# Patient Record
Sex: MALE | Race: WHITE | NOT HISPANIC OR LATINO | Employment: OTHER | URBAN - METROPOLITAN AREA
[De-identification: names, ages, dates, MRNs, and addresses within clinical notes are randomized per-mention and may not be internally consistent; named-entity substitution may affect disease eponyms.]

---

## 2017-01-03 RX ORDER — ESCITALOPRAM OXALATE 5 MG/1
5 TABLET ORAL DAILY
Qty: 90 TABLET | Refills: 3 | Status: SHIPPED | OUTPATIENT
Start: 2017-01-03 | End: 2017-12-13

## 2017-01-03 RX ORDER — ATORVASTATIN CALCIUM 20 MG/1
20 TABLET, FILM COATED ORAL DAILY
Qty: 90 TABLET | Refills: 1 | Status: SHIPPED | OUTPATIENT
Start: 2017-01-03 | End: 2017-06-24 | Stop reason: SDUPTHER

## 2017-01-03 RX ORDER — LISINOPRIL 5 MG/1
5 TABLET ORAL DAILY
Qty: 90 TABLET | Refills: 1 | Status: SHIPPED | OUTPATIENT
Start: 2017-01-03 | End: 2017-06-24 | Stop reason: SDUPTHER

## 2017-05-11 ENCOUNTER — TELEPHONE (OUTPATIENT)
Dept: INTERNAL MEDICINE | Facility: CLINIC | Age: 67
End: 2017-05-11

## 2017-05-11 NOTE — TELEPHONE ENCOUNTER
----- Message from Emily Cason sent at 5/11/2017 12:23 PM CDT -----  Contact: self [phone]  Patient is calling to received MD  medical licence number, stated he need to write it on a form he has . Please call and advise , Thanks !

## 2017-06-25 RX ORDER — ATORVASTATIN CALCIUM 20 MG/1
TABLET, FILM COATED ORAL
Qty: 90 TABLET | Refills: 3 | Status: SHIPPED | OUTPATIENT
Start: 2017-06-25 | End: 2017-06-27 | Stop reason: SDUPTHER

## 2017-06-25 RX ORDER — LISINOPRIL 5 MG/1
TABLET ORAL
Qty: 90 TABLET | Refills: 3 | Status: SHIPPED | OUTPATIENT
Start: 2017-06-25 | End: 2017-06-27 | Stop reason: SDUPTHER

## 2017-06-26 RX ORDER — TADALAFIL 20 MG/1
20 TABLET ORAL EVERY OTHER DAY
Qty: 6 TABLET | Refills: 11 | Status: SHIPPED | OUTPATIENT
Start: 2017-06-26 | End: 2019-12-19

## 2017-06-27 ENCOUNTER — PATIENT MESSAGE (OUTPATIENT)
Dept: INTERNAL MEDICINE | Facility: CLINIC | Age: 67
End: 2017-06-27

## 2017-06-27 RX ORDER — ATORVASTATIN CALCIUM 20 MG/1
20 TABLET, FILM COATED ORAL DAILY
Qty: 90 TABLET | Refills: 3 | Status: SHIPPED | OUTPATIENT
Start: 2017-06-27 | End: 2017-12-13

## 2017-06-27 RX ORDER — LISINOPRIL 5 MG/1
5 TABLET ORAL DAILY
Qty: 90 TABLET | Refills: 3 | Status: SHIPPED | OUTPATIENT
Start: 2017-06-27 | End: 2018-05-09 | Stop reason: DRUGHIGH

## 2017-12-13 ENCOUNTER — OFFICE VISIT (OUTPATIENT)
Dept: INTERNAL MEDICINE | Facility: CLINIC | Age: 67
End: 2017-12-13
Payer: MEDICARE

## 2017-12-13 ENCOUNTER — HOSPITAL ENCOUNTER (OUTPATIENT)
Dept: RADIOLOGY | Facility: HOSPITAL | Age: 67
Discharge: HOME | End: 2017-12-13
Attending: INTERNAL MEDICINE
Payer: MEDICARE

## 2017-12-13 ENCOUNTER — OFFICE VISIT (OUTPATIENT)
Dept: UROLOGY | Facility: CLINIC | Age: 67
End: 2017-12-13
Payer: MEDICARE

## 2017-12-13 ENCOUNTER — HOSPITAL ENCOUNTER (OUTPATIENT)
Dept: CARDIOLOGY | Facility: CLINIC | Age: 67
Discharge: HOME | End: 2017-12-13
Payer: MEDICARE

## 2017-12-13 ENCOUNTER — IMMUNIZATION (OUTPATIENT)
Dept: INTERNAL MEDICINE | Facility: CLINIC | Age: 67
End: 2017-12-13
Payer: MEDICARE

## 2017-12-13 VITALS
SYSTOLIC BLOOD PRESSURE: 124 MMHG | HEIGHT: 75 IN | WEIGHT: 194 LBS | OXYGEN SATURATION: 97 % | BODY MASS INDEX: 24.12 KG/M2 | DIASTOLIC BLOOD PRESSURE: 82 MMHG | HEART RATE: 64 BPM

## 2017-12-13 VITALS — RESPIRATION RATE: 16 BRPM | BODY MASS INDEX: 24.12 KG/M2 | HEIGHT: 75 IN | WEIGHT: 194 LBS

## 2017-12-13 DIAGNOSIS — E78.00 PURE HYPERCHOLESTEROLEMIA: ICD-10-CM

## 2017-12-13 DIAGNOSIS — M53.9 DISORDER CHARACTERIZED BY BACK PAIN: ICD-10-CM

## 2017-12-13 DIAGNOSIS — R41.3 MEMORY CHANGES: ICD-10-CM

## 2017-12-13 DIAGNOSIS — R97.20 ELEVATED PSA: Primary | ICD-10-CM

## 2017-12-13 DIAGNOSIS — R00.2 PALPITATION: ICD-10-CM

## 2017-12-13 DIAGNOSIS — Z13.6 SCREENING FOR ABDOMINAL AORTIC ANEURYSM: ICD-10-CM

## 2017-12-13 DIAGNOSIS — I10 ESSENTIAL HYPERTENSION: ICD-10-CM

## 2017-12-13 DIAGNOSIS — I10 ESSENTIAL HYPERTENSION: Primary | ICD-10-CM

## 2017-12-13 PROCEDURE — 99999 PR PBB SHADOW E&M-EST. PATIENT-LVL III: CPT | Mod: PBBFAC,,, | Performed by: INTERNAL MEDICINE

## 2017-12-13 PROCEDURE — 72100 X-RAY EXAM L-S SPINE 2/3 VWS: CPT | Mod: 26,,, | Performed by: RADIOLOGY

## 2017-12-13 PROCEDURE — G0008 ADMIN INFLUENZA VIRUS VAC: HCPCS | Mod: PBBFAC

## 2017-12-13 PROCEDURE — 99213 OFFICE O/P EST LOW 20 MIN: CPT | Mod: PBBFAC,25 | Performed by: UROLOGY

## 2017-12-13 PROCEDURE — 72120 X-RAY BEND ONLY L-S SPINE: CPT | Mod: 26,,, | Performed by: RADIOLOGY

## 2017-12-13 PROCEDURE — 99213 OFFICE O/P EST LOW 20 MIN: CPT | Mod: PBBFAC,25,27 | Performed by: INTERNAL MEDICINE

## 2017-12-13 PROCEDURE — 99999 PR PBB SHADOW E&M-EST. PATIENT-LVL III: CPT | Mod: PBBFAC,,, | Performed by: UROLOGY

## 2017-12-13 PROCEDURE — 93005 ELECTROCARDIOGRAM TRACING: CPT | Mod: PBBFAC | Performed by: INTERNAL MEDICINE

## 2017-12-13 PROCEDURE — 99213 OFFICE O/P EST LOW 20 MIN: CPT | Mod: S$PBB,,, | Performed by: UROLOGY

## 2017-12-13 PROCEDURE — 71020 XR CHEST PA AND LATERAL: CPT | Mod: TC

## 2017-12-13 PROCEDURE — 99214 OFFICE O/P EST MOD 30 MIN: CPT | Mod: S$PBB,,, | Performed by: INTERNAL MEDICINE

## 2017-12-13 PROCEDURE — 71020 XR CHEST PA AND LATERAL: CPT | Mod: 26,,, | Performed by: RADIOLOGY

## 2017-12-13 PROCEDURE — 72100 X-RAY EXAM L-S SPINE 2/3 VWS: CPT | Mod: TC

## 2017-12-13 PROCEDURE — 93010 ELECTROCARDIOGRAM REPORT: CPT | Mod: S$PBB,,, | Performed by: INTERNAL MEDICINE

## 2017-12-13 NOTE — PROGRESS NOTES
Subjective:      Patient ID: Ha Madrid is a 67 y.o. male.    Chief Complaint: Follow-up and addressing concerns from the [employer]   Ref: PI # [id number]  GOMEZ ID # [id number]    HPI:  HPI   Formal flight exam, paperwork and exam completed and where specialty exams were completed they were reviewed  Discussed eye exam: reviewed    Hypertension: well controlled    Hyperlipidemia: off statin    Colonoscopy 12/13 follow up in 5 years for colon polyps  Optho:9/27/2017 bilateral cataract, vitreous floater and presbyopia    Flu:  Tetanus: done  Shingles:discussed  Pneumovax: 2014  Prevnar: due and given 12/15/2015  Advance Directives:reminded the patient  Hearing:no deficits  Seat Belt : yes    Depression: patient has not taken lexapro since 3/2017 : had previously taken it intermittently  but not on a regular basis. Use was related to situational depression. Patient's situation has improved depression has resolved and no depression is being treated with or without medication.       Consultants:  Dermatology: Dr. Shukla  Urology: Dr. Graves  Today ( will follow up PSA yearly: biopsied last May)     Exercise: will start exercising  Family history of thyroid disease  Patient Active Problem List   Diagnosis    Anemia    Sleep difficulties: aggravated by leg discomfort ( anterior thigh) Ambien with an Aleve    Hypertension    Hyperlipidemia    Abnormal CT lung screening: follow up pulmonary nodule    Thyroid condition: calcifications, 1979 thyroid lobectomy    Colon polyps:12/13 follow up in 5 years    Special screening for malignant neoplasms, colon    Personal history of colonic polyps    Basal cell carcinoma, face     Past Medical History:   Diagnosis Date    Allergy     Basal cell carcinoma, face     left mandible    GERD (gastroesophageal reflux disease)     Hypertension      Past Surgical History:   Procedure Laterality Date    excision of basal cell ca on face      left  "hip      THYROIDECTOMY, PARTIAL      TONSILLECTOMY      VASECTOMY       Family History   Problem Relation Age of Onset    Cancer Mother     Melanoma Neg Hx      Review of Systems   Constitutional: Negative for activity change and unexpected weight change.   HENT: Negative for hearing loss, rhinorrhea and trouble swallowing.    Eyes: Negative for discharge and visual disturbance.   Respiratory: Negative for chest tightness and wheezing.    Cardiovascular: Negative for chest pain and palpitations.        Noticing some skip   Gastrointestinal: Negative for blood in stool, constipation, diarrhea and vomiting.   Endocrine: Negative for polydipsia and polyuria.   Genitourinary: Negative for difficulty urinating, hematuria and urgency.   Musculoskeletal: Positive for arthralgias and neck pain. Negative for joint swelling.   Neurological: Negative for weakness and headaches.   Psychiatric/Behavioral: Negative for confusion and dysphoric mood.     Objective:     Vitals:    12/13/17 1304   BP: 124/82   Pulse: 64   SpO2: 97%   Weight: 88 kg (194 lb)   Height: 6' 3" (1.905 m)   PainSc:   2   PainLoc: Back     Body mass index is 24.25 kg/m².  Physical Exam   Constitutional: He is oriented to person, place, and time. He appears well-developed and well-nourished. No distress.   Neck: Carotid bruit is not present. No thyromegaly present.   Cardiovascular: Normal rate, regular rhythm and normal heart sounds.  PMI is not displaced.    Pulmonary/Chest: Effort normal and breath sounds normal. No respiratory distress.   Abdominal: Soft. Bowel sounds are normal. He exhibits no distension. There is no tenderness.   Musculoskeletal: He exhibits no edema.   Neurological: He is alert and oriented to person, place, and time.     Assessment:     1. Essential hypertension    2. Pure hypercholesterolemia    3. Screening for abdominal aortic aneurysm    4. Palpitation    5. Disorder characterized by back pain    6. Memory changes      Plan: "   Ha was seen today for follow-up.    Diagnoses and all orders for this visit:    Amended December 13, 2017 note for clarification    Essential hypertension: well controlled  -     CBC auto differential; Future  -     Comprehensive metabolic panel; Future  -     X-Ray Chest PA And Lateral; Future    Pure hypercholesterolemia: on med and will obtain lab  -     Lipid panel; Future    Screening for abdominal aortic aneurysm  -     CAR US Abdominal Aorta; Future    Palpitation: lno significant issues  -     EKG 12-lead; Future    Disorder characterized by back pain  -     X-Ray Lumbar Spine Ap Lateral w/Flex Ext; Future    Memory changes: no significant issue  -     Vitamin B12; Future      Return in about 1 year (around 12/13/2018) for Yearly review.     Medication List          Accurate as of 12/13/17  1:55 PM. If you have any questions, ask your nurse or doctor.               CONTINUE taking these medications    aspirin 81 MG EC tablet  Commonly known as:  ECOTRIN     lisinopril 5 MG tablet  Commonly known as:  PRINIVIL,ZESTRIL  Take 1 tablet (5 mg total) by mouth once daily.     neomycin-polymyxin-dexamethasone 3.5 mg/g-10,000 unit/g-0.1 % Oint  Commonly known as:  DEXACINE  Use daily to eyelids as needed     omeprazole 20 MG tablet  Commonly known as:  PRILOSEC OTC     tadalafil 20 MG Tab  Commonly known as:  CIALIS  Take 1 tablet (20 mg total) by mouth every other day. Take every other day as needed        STOP taking these medications    atorvastatin 20 MG tablet  Commonly known as:  LIPITOR  Stopped by:  Brook Vasquez MD     escitalopram oxalate 5 MG Tab  Commonly known as:  LEXAPRO  Stopped by:  Brook Vasquez MD     multivitamin capsule  Stopped by:  Brook Vasquez MD

## 2017-12-13 NOTE — PROGRESS NOTES
Subjective:       Patient ID: Ha Madrid is a 67 y.o. male.    Chief Complaint: Elevated PSA    HPI patient is status post negative truss with biopsy and had a PSA of 6.6.  He should today for KENDRA and needs a repeat PSA.  Patient is voiding well    Past Medical History:   Diagnosis Date    Allergy     Basal cell carcinoma, face     left mandible    GERD (gastroesophageal reflux disease)     Hypertension        Past Surgical History:   Procedure Laterality Date    excision of basal cell ca on face      left hip      THYROIDECTOMY, PARTIAL      TONSILLECTOMY      VASECTOMY         Family History   Problem Relation Age of Onset    Cancer Mother     Melanoma Neg Hx        Social History     Social History    Marital status:      Spouse name: N/A    Number of children: N/A    Years of education: N/A     Occupational History    Physician      Social History Main Topics    Smoking status: Former Smoker     Packs/day: 0.50     Years: 15.00     Types: Cigarettes    Smokeless tobacco: Not on file    Alcohol use No    Drug use: No    Sexual activity: Yes     Birth control/ protection: Post-menopausal     Other Topics Concern    Not on file     Social History Narrative    No narrative on file       Allergies:  No known allergies    Medications:    Current Outpatient Prescriptions:     aspirin (ECOTRIN) 81 MG EC tablet, Take 81 mg by mouth once daily.  , Disp: , Rfl:     lisinopril (PRINIVIL,ZESTRIL) 5 MG tablet, Take 1 tablet (5 mg total) by mouth once daily., Disp: 90 tablet, Rfl: 3    neomycin-polymyxin-dexamethasone (DEXACINE) 3.5-10,000-0.1 mg-unit/g-% Oint, Use daily to eyelids as needed, Disp: 3.5 g, Rfl: 0    omeprazole (PRILOSEC OTC) 20 MG tablet, Take 20 mg by mouth once daily. Taking rarely, Disp: , Rfl:     tadalafil (CIALIS) 20 MG Tab, Take 1 tablet (20 mg total) by mouth every other day. Take every other day as needed, Disp: 6 tablet, Rfl: 11    Review of Systems    Constitutional: Negative for activity change, appetite change, chills, diaphoresis, fatigue, fever and unexpected weight change.   HENT: Negative for congestion, dental problem, hearing loss, mouth sores, postnasal drip, rhinorrhea, sinus pressure and trouble swallowing.    Eyes: Negative for pain, discharge and itching.   Respiratory: Negative for apnea, cough, choking, chest tightness, shortness of breath and wheezing.    Cardiovascular: Negative for chest pain, palpitations and leg swelling.   Gastrointestinal: Negative for abdominal distention, abdominal pain, anal bleeding, blood in stool, constipation, diarrhea, nausea, rectal pain and vomiting.   Endocrine: Negative for polydipsia and polyuria.   Genitourinary: Negative for decreased urine volume, difficulty urinating, discharge, dysuria, enuresis, flank pain, frequency, genital sores, hematuria, penile pain, penile swelling, scrotal swelling, testicular pain and urgency.   Musculoskeletal: Negative for arthralgias, back pain and myalgias.   Skin: Negative for color change, rash and wound.   Neurological: Negative for dizziness, syncope, speech difficulty, light-headedness and headaches.   Hematological: Negative for adenopathy. Does not bruise/bleed easily.   Psychiatric/Behavioral: Negative for behavioral problems, confusion, hallucinations and sleep disturbance.       Objective:      Physical Exam   Constitutional: He appears well-developed.   HENT:   Head: Normocephalic.   Cardiovascular: Normal rate.    Pulmonary/Chest: Effort normal.   Abdominal: Soft.   Genitourinary: Prostate normal.   Genitourinary Comments: 30 g benign   Neurological: He is alert.   Skin: Skin is warm.     Psychiatric: He has a normal mood and affect.       Assessment:       1. Elevated PSA        Plan:       Ha was seen today for elevated psa.    Diagnoses and all orders for this visit:    Elevated PSA  -     Prostate Specific Antigen, Diagnostic; Future  -     Prostate  Specific Antigen, Diagnostic; Future        on review PSA and return to clinic 1 year with PSA if his PSA is okay

## 2017-12-14 ENCOUNTER — CLINICAL SUPPORT (OUTPATIENT)
Dept: CARDIOLOGY | Facility: CLINIC | Age: 67
End: 2017-12-14
Attending: INTERNAL MEDICINE
Payer: MEDICARE

## 2017-12-14 DIAGNOSIS — Z13.6 SCREENING FOR ABDOMINAL AORTIC ANEURYSM: ICD-10-CM

## 2017-12-14 LAB — VASCULAR ABDOMINAL AORTIC ANEURYSM (AAA): 2.76

## 2017-12-14 PROCEDURE — 93978 VASCULAR STUDY: CPT | Mod: PBBFAC | Performed by: INTERNAL MEDICINE

## 2017-12-27 ENCOUNTER — TELEPHONE (OUTPATIENT)
Dept: INTERNAL MEDICINE | Facility: CLINIC | Age: 67
End: 2017-12-27

## 2017-12-27 DIAGNOSIS — E55.9 MILD VITAMIN D DEFICIENCY: ICD-10-CM

## 2017-12-27 DIAGNOSIS — R97.20 ELEVATED PSA: ICD-10-CM

## 2017-12-27 DIAGNOSIS — S32.010S COMPRESSION FRACTURE OF L1 LUMBAR VERTEBRA, SEQUELA: ICD-10-CM

## 2017-12-27 DIAGNOSIS — D64.9 ANEMIA, UNSPECIFIED TYPE: Primary | ICD-10-CM

## 2017-12-27 NOTE — TELEPHONE ENCOUNTER
Please schedule the following to [facility] at 9:00 am on Friday    Procedures Ordered This Visit    Vitamin D              Sedimentation rate, manual              Reticulocytes              Protein electrophoresis, serum              PROSTATE SPECIFIC ANTIGEN, DIAGNOSTIC              Iron and TIBC              HAPTOGLOBIN              FOLATE RBC              Ferritin              C-reactive protein               Please also add the diagnostic PSA of Dr. Star Madrid is aware that this is being scheduled and there is no need to call him.

## 2017-12-29 ENCOUNTER — LAB VISIT (OUTPATIENT)
Dept: LAB | Facility: HOSPITAL | Age: 67
End: 2017-12-29
Attending: UROLOGY
Payer: MEDICARE

## 2017-12-29 DIAGNOSIS — S32.010S COMPRESSION FRACTURE OF L1 LUMBAR VERTEBRA, SEQUELA: ICD-10-CM

## 2017-12-29 DIAGNOSIS — D64.9 ANEMIA, UNSPECIFIED TYPE: ICD-10-CM

## 2017-12-29 DIAGNOSIS — R97.20 ELEVATED PSA: ICD-10-CM

## 2017-12-29 DIAGNOSIS — E55.9 MILD VITAMIN D DEFICIENCY: ICD-10-CM

## 2017-12-29 LAB
25(OH)D3+25(OH)D2 SERPL-MCNC: 30 NG/ML
COMPLEXED PSA SERPL-MCNC: 5.7 NG/ML
COMPLEXED PSA SERPL-MCNC: 5.7 NG/ML
CRP SERPL-MCNC: 0.7 MG/L
ERYTHROCYTE [SEDIMENTATION RATE] IN BLOOD BY WESTERGREN METHOD: 7 MM/HR
FERRITIN SERPL-MCNC: 31 NG/ML
HAPTOGLOB SERPL-MCNC: 136 MG/DL
IRON SERPL-MCNC: 73 UG/DL
RETICS/RBC NFR AUTO: 1 %
SATURATED IRON: 20 %
TOTAL IRON BINDING CAPACITY: 360 UG/DL
TRANSFERRIN SERPL-MCNC: 243 MG/DL

## 2017-12-29 PROCEDURE — 84153 ASSAY OF PSA TOTAL: CPT

## 2017-12-29 PROCEDURE — 86140 C-REACTIVE PROTEIN: CPT

## 2017-12-29 PROCEDURE — 82747 ASSAY OF FOLIC ACID RBC: CPT

## 2017-12-29 PROCEDURE — 85045 AUTOMATED RETICULOCYTE COUNT: CPT

## 2017-12-29 PROCEDURE — 83540 ASSAY OF IRON: CPT

## 2017-12-29 PROCEDURE — 84165 PROTEIN E-PHORESIS SERUM: CPT

## 2017-12-29 PROCEDURE — 82306 VITAMIN D 25 HYDROXY: CPT

## 2017-12-29 PROCEDURE — 82728 ASSAY OF FERRITIN: CPT

## 2017-12-29 PROCEDURE — 83010 ASSAY OF HAPTOGLOBIN QUANT: CPT

## 2017-12-29 PROCEDURE — 84165 PROTEIN E-PHORESIS SERUM: CPT | Mod: 26,,, | Performed by: PATHOLOGY

## 2017-12-29 PROCEDURE — 85651 RBC SED RATE NONAUTOMATED: CPT

## 2018-01-01 ENCOUNTER — TELEPHONE (OUTPATIENT)
Dept: INTERNAL MEDICINE | Facility: CLINIC | Age: 68
End: 2018-01-01

## 2018-01-01 DIAGNOSIS — D64.9 ANEMIA, UNSPECIFIED TYPE: Primary | ICD-10-CM

## 2018-01-01 NOTE — TELEPHONE ENCOUNTER
I have sent th labs to Dr. Madrid. Please see if we can just recheck a cbc in 3 months:    CBC ordered

## 2018-01-02 LAB
ALBUMIN SERPL ELPH-MCNC: 3.89 G/DL
ALPHA1 GLOB SERPL ELPH-MCNC: 0.25 G/DL
ALPHA2 GLOB SERPL ELPH-MCNC: 0.69 G/DL
B-GLOBULIN SERPL ELPH-MCNC: 0.62 G/DL
FOLATE RBC-MCNC: 576 NG/ML
GAMMA GLOB SERPL ELPH-MCNC: 0.85 G/DL
PATHOLOGIST INTERPRETATION SPE: NORMAL
PROT SERPL-MCNC: 6.3 G/DL

## 2018-01-05 ENCOUNTER — PATIENT MESSAGE (OUTPATIENT)
Dept: INTERNAL MEDICINE | Facility: CLINIC | Age: 68
End: 2018-01-05

## 2018-04-05 ENCOUNTER — LAB VISIT (OUTPATIENT)
Dept: LAB | Facility: HOSPITAL | Age: 68
End: 2018-04-05
Attending: INTERNAL MEDICINE
Payer: MEDICARE

## 2018-04-05 DIAGNOSIS — D64.9 ANEMIA, UNSPECIFIED TYPE: ICD-10-CM

## 2018-04-05 LAB
BASOPHILS # BLD AUTO: 0.01 K/UL
BASOPHILS NFR BLD: 0.1 %
DIFFERENTIAL METHOD: ABNORMAL
EOSINOPHIL # BLD AUTO: 0.1 K/UL
EOSINOPHIL NFR BLD: 0.9 %
ERYTHROCYTE [DISTWIDTH] IN BLOOD BY AUTOMATED COUNT: 14 %
HCT VFR BLD AUTO: 38.7 %
HGB BLD-MCNC: 12.6 G/DL
IMM GRANULOCYTES # BLD AUTO: 0.02 K/UL
IMM GRANULOCYTES NFR BLD AUTO: 0.3 %
LYMPHOCYTES # BLD AUTO: 3.1 K/UL
LYMPHOCYTES NFR BLD: 39.7 %
MCH RBC QN AUTO: 29.9 PG
MCHC RBC AUTO-ENTMCNC: 32.6 G/DL
MCV RBC AUTO: 92 FL
MONOCYTES # BLD AUTO: 0.6 K/UL
MONOCYTES NFR BLD: 7.3 %
NEUTROPHILS # BLD AUTO: 4 K/UL
NEUTROPHILS NFR BLD: 51.7 %
NRBC BLD-RTO: 0 /100 WBC
PLATELET # BLD AUTO: 333 K/UL
PMV BLD AUTO: 9.9 FL
RBC # BLD AUTO: 4.22 M/UL
WBC # BLD AUTO: 7.79 K/UL

## 2018-04-05 PROCEDURE — 36415 COLL VENOUS BLD VENIPUNCTURE: CPT | Mod: PO

## 2018-04-05 PROCEDURE — 85025 COMPLETE CBC W/AUTO DIFF WBC: CPT

## 2018-05-09 ENCOUNTER — OFFICE VISIT (OUTPATIENT)
Dept: INTERNAL MEDICINE | Facility: CLINIC | Age: 68
End: 2018-05-09
Payer: MEDICARE

## 2018-05-09 VITALS
WEIGHT: 198.63 LBS | HEART RATE: 70 BPM | DIASTOLIC BLOOD PRESSURE: 68 MMHG | SYSTOLIC BLOOD PRESSURE: 122 MMHG | HEIGHT: 74 IN | BODY MASS INDEX: 25.49 KG/M2

## 2018-05-09 DIAGNOSIS — R51.9 HEADACHE ABOVE THE EYE REGION: ICD-10-CM

## 2018-05-09 DIAGNOSIS — J34.89 RHINORRHEA: ICD-10-CM

## 2018-05-09 DIAGNOSIS — J34.89 NASAL DRAINAGE: ICD-10-CM

## 2018-05-09 DIAGNOSIS — R51.9 HEADACHE DISORDER: Primary | ICD-10-CM

## 2018-05-09 PROCEDURE — 99999 PR PBB SHADOW E&M-EST. PATIENT-LVL IV: CPT | Mod: PBBFAC,,, | Performed by: INTERNAL MEDICINE

## 2018-05-09 PROCEDURE — 99214 OFFICE O/P EST MOD 30 MIN: CPT | Mod: PBBFAC | Performed by: INTERNAL MEDICINE

## 2018-05-09 PROCEDURE — 99214 OFFICE O/P EST MOD 30 MIN: CPT | Mod: S$PBB,,, | Performed by: INTERNAL MEDICINE

## 2018-05-09 RX ORDER — LISINOPRIL 2.5 MG/1
5 TABLET ORAL DAILY
Qty: 180 TABLET | Refills: 3 | Status: SHIPPED | OUTPATIENT
Start: 2018-05-09 | End: 2019-10-25 | Stop reason: SDUPTHER

## 2018-05-09 RX ORDER — ATORVASTATIN CALCIUM 20 MG/1
TABLET, FILM COATED ORAL
COMMUNITY
Start: 2018-03-09 | End: 2018-12-18

## 2018-05-09 NOTE — PROGRESS NOTES
Subjective:      Patient ID: Ha Madrid is a 67 y.o. male.    Chief Complaint: Headache (3 to 4 months )    HPI:  HPI   Patient has had headaches for the last several months. One area is in the right occipital area and the right frontal and right maxillary sinus with bending forward with watery discharge coming out of the nose. He also has had some swallowing problems. His father had a erosion of sinuses into the brain.    Exercising: bicycle 3 days a week interval training, 3 days a week weight training and takes Sunday off.    Patient has restarted lipitor and muscle pain and not come back.  Patient Active Problem List   Diagnosis    Anemia    Sleep difficulties: aggravated by leg discomfort ( anterior thigh) Ambien with an Aleve    Hypertension    Hyperlipidemia    Abnormal CT lung screening: follow up pulmonary nodule    Thyroid condition: calcifications, 1979 thyroid lobectomy    Colon polyps:12/13 follow up in 5 years    Special screening for malignant neoplasms, colon    Personal history of colonic polyps    Basal cell carcinoma, face     Past Medical History:   Diagnosis Date    Allergy     Basal cell carcinoma, face     left mandible    GERD (gastroesophageal reflux disease)     Hypertension      Past Surgical History:   Procedure Laterality Date    excision of basal cell ca on face      left hip      THYROIDECTOMY, PARTIAL      TONSILLECTOMY      VASECTOMY       Family History   Problem Relation Age of Onset    Cancer Mother     Melanoma Neg Hx      Review of Systems   Constitutional: Negative for activity change and unexpected weight change.   HENT: Positive for trouble swallowing. Negative for hearing loss and rhinorrhea.    Eyes: Negative for discharge and visual disturbance.   Respiratory: Negative for chest tightness and wheezing.    Cardiovascular: Negative for chest pain and palpitations.   Gastrointestinal: Negative for blood in stool, constipation, diarrhea and  "vomiting.   Endocrine: Negative for polydipsia and polyuria.   Genitourinary: Positive for difficulty urinating. Negative for hematuria and urgency.   Musculoskeletal: Positive for arthralgias and neck pain. Negative for joint swelling.   Neurological: Positive for headaches. Negative for weakness.   Psychiatric/Behavioral: Negative for confusion and dysphoric mood.     Objective:     Vitals:    05/09/18 0928   BP: 122/68   Pulse: 70   Weight: 90.1 kg (198 lb 10.2 oz)   Height: 6' 2" (1.88 m)   PainSc:   2   PainLoc: Head     Body mass index is 25.5 kg/m².  Physical Exam   Constitutional: He appears well-developed and well-nourished. No distress.   Neck: Carotid bruit is not present. No thyromegaly present.   Cardiovascular: Normal rate, regular rhythm and normal heart sounds.    Pulmonary/Chest: Effort normal and breath sounds normal. No respiratory distress.   Musculoskeletal: He exhibits no edema.     Assessment:     1. Headache disorder    2. Headache above the eye region    3. Nasal drainage    4. Rhinorrhea      Plan:   Ha was seen today for headache.    Diagnoses and all orders for this visit:    Headache disorder  -     Ambulatory consult to ENT    Headache above the eye region  -     Ambulatory consult to ENT    Nasal drainage  -     Ambulatory consult to ENT    Rhinorrhea: ENT    Other orders  -     lisinopril (PRINIVIL,ZESTRIL) 2.5 MG tablet; Take 2 tablets (5 mg total) by mouth once daily. Or per provider directions           Medication List          Accurate as of 5/9/18  6:10 PM. If you have any questions, ask your nurse or doctor.               CHANGE how you take these medications    lisinopril 2.5 MG tablet  Commonly known as:  PRINIVIL,ZESTRIL  Take 2 tablets (5 mg total) by mouth once daily. Or per provider directions  What changed:  · medication strength  · additional instructions  Changed by:  Brook Vasquez MD        CONTINUE taking these medications    aspirin 81 MG EC tablet  Commonly " known as:  ECOTRIN     atorvastatin 20 MG tablet  Commonly known as:  LIPITOR     neomycin-polymyxin-dexamethasone 3.5 mg/g-10,000 unit/g-0.1 % Oint  Commonly known as:  DEXACINE  Use daily to eyelids as needed     omeprazole 20 MG tablet  Commonly known as:  PRILOSEC OTC     tadalafil 20 MG Tab  Commonly known as:  CIALIS  Take 1 tablet (20 mg total) by mouth every other day. Take every other day as needed           Where to Get Your Medications      These medications were sent to [facility] - [place], [place] - [address] AT [address] [place] [address] [address], [facility] [address]    Phone:  [phone]   · lisinopril 2.5 MG tablet

## 2018-06-19 ENCOUNTER — OFFICE VISIT (OUTPATIENT)
Dept: OTOLARYNGOLOGY | Facility: CLINIC | Age: 68
End: 2018-06-19
Payer: MEDICARE

## 2018-06-19 VITALS
HEART RATE: 50 BPM | BODY MASS INDEX: 26.2 KG/M2 | WEIGHT: 204.13 LBS | SYSTOLIC BLOOD PRESSURE: 148 MMHG | HEIGHT: 74 IN | DIASTOLIC BLOOD PRESSURE: 90 MMHG

## 2018-06-19 DIAGNOSIS — J32.0 CHRONIC MAXILLARY SINUSITIS: Primary | ICD-10-CM

## 2018-06-19 DIAGNOSIS — K21.9 LARYNGOPHARYNGEAL REFLUX (LPR): ICD-10-CM

## 2018-06-19 DIAGNOSIS — J34.2 DEVIATED NASAL SEPTUM: ICD-10-CM

## 2018-06-19 DIAGNOSIS — J34.89 RHINORRHEA: ICD-10-CM

## 2018-06-19 PROCEDURE — 99999 PR PBB SHADOW E&M-EST. PATIENT-LVL III: CPT | Mod: PBBFAC,,, | Performed by: OTOLARYNGOLOGY

## 2018-06-19 PROCEDURE — 99204 OFFICE O/P NEW MOD 45 MIN: CPT | Mod: 25,S$PBB,, | Performed by: OTOLARYNGOLOGY

## 2018-06-19 PROCEDURE — 31575 DIAGNOSTIC LARYNGOSCOPY: CPT | Mod: PBBFAC | Performed by: OTOLARYNGOLOGY

## 2018-06-19 PROCEDURE — 99213 OFFICE O/P EST LOW 20 MIN: CPT | Mod: PBBFAC | Performed by: OTOLARYNGOLOGY

## 2018-06-19 RX ORDER — AMOXICILLIN 500 MG/1
500 TABLET, FILM COATED ORAL EVERY 12 HOURS
Qty: 28 TABLET | Refills: 0 | Status: SHIPPED | OUTPATIENT
Start: 2018-06-19 | End: 2018-07-03

## 2018-06-19 RX ORDER — FLUTICASONE PROPIONATE 50 MCG
2 SPRAY, SUSPENSION (ML) NASAL DAILY
Qty: 16 G | Refills: 2 | Status: SHIPPED | OUTPATIENT
Start: 2018-06-19 | End: 2018-09-12 | Stop reason: SDUPTHER

## 2018-06-19 NOTE — Clinical Note
June 19, 2018      Brook Vasquez MD  [address]  [facility] [address]           [facility] Otorhinol[facility]ology  [address]  [facility] [address]  Phone: [phone]  Fax: [phone]          Patient: Ha Madrid   MR Number: [id number]   YOB: 1950   Date of Visit: 6/19/2018       Dear Dr. Brook Vasquez:    Thank you for referring Ha Madrid to me for evaluation. Attached you will find relevant portions of my assessment and plan of care.    If you have questions, please do not hesitate to call me. I look forward to following Ha Madrid along with you.    Sincerely,    Jose Luis Urrutia MD    Enclosure  CC:  No Recipients    If you would like to receive this communication electronically, please contact [email or web address] or [phone] to request more information on [facility] [facility] Link access.    For providers and/or their staff who would like to refer a patient to [facility], please contact us through our one-stop-shop provider referral line, [facility], at [phone].    If you feel you have received this communication in error or would no longer like to receive these types of communications, please e-mail [email or web address]

## 2018-06-19 NOTE — PROGRESS NOTES
Subjective:      Ha Madrid is a 67 y.o. male who was referred to me by Dr. Brook Vasquez in consultation for nasal congestion.    He relates a 5-year history of perennial, moderate severity nasal blockage, alternating sides but worse on the right, and most pronounced at night.  This is associated with pressure in the cheeks and forehead and around the right eye.  His most concerning symptoms is copious, sporadic, watery rhinorrhea, which is both posterior and anterior, and provoked by position changes.  He relates that his father developed spontaneous CSF rhinorrhea at age 85 and  subsequent to craniotomy.  He denies headaches, hyposmia, aural fullness or notable sinus infections.  He does note some sneezing episodes, but has never had allergy testing or asthma.  He recalls using Nasonex many years ago with some relief, but gradually stopped using it.  He does not use antihistamines for concern it would affect his ability as a .  He takes omeprazole infrequently for reflux.  He does note recently some globus sensation and occasional difficulty swallowing.  He relates having nasal trauma playing sports in his youth, but no repair or nasal surgery.    SNOT-22 score = 21, NOSE score = 50%, ETDQ-7 score = 1.6    Global QOL = 85%    Days missed = Less than 5      Past Medical History  He has a past medical history of Allergy; Basal cell carcinoma, face; GERD (gastroesophageal reflux disease); and Hypertension.    Past Surgical History  He has a past surgical history that includes Thyroidectomy, partial; excision of basal cell ca on face; left hip; Vasectomy; and Tonsillectomy.    Family History  His family history includes Cancer in his mother.    Social History  He reports that he has quit smoking. His smoking use included Cigarettes. He has a 7.50 pack-year smoking history. He has never used smokeless tobacco. He reports that he does not drink alcohol or use drugs.    Allergies  He is allergic to  "no known allergies.    Medications   He has a current medication list which includes the following prescription(s): aspirin, atorvastatin, lisinopril, neomycin-polymyxin-dexamethasone, omeprazole, tadalafil, amoxicillin, and fluticasone.    Review of Systems  Review of Systems   Constitutional: Negative for fatigue, fever and unexpected weight change.   HENT: Positive for congestion, postnasal drip, rhinorrhea, sinus pressure and trouble swallowing. Negative for dental problem, ear discharge, ear pain, facial swelling, hearing loss, hoarse voice, nosebleeds, sore throat, tinnitus and voice change.    Eyes: Negative for photophobia, discharge, itching and visual disturbance.   Respiratory: Negative for apnea, cough, shortness of breath and wheezing.    Cardiovascular: Negative for chest pain and palpitations.   Gastrointestinal: Negative for abdominal pain, nausea and vomiting.   Endocrine: Negative for cold intolerance and heat intolerance.   Genitourinary: Positive for difficulty urinating.   Musculoskeletal: Negative for arthralgias, back pain, myalgias and neck pain.   Skin: Negative for rash.   Allergic/Immunologic: Positive for environmental allergies. Negative for food allergies.   Neurological: Negative for dizziness, seizures, syncope, weakness and headaches.   Hematological: Negative for adenopathy. Bruises/bleeds easily.   Psychiatric/Behavioral: Negative for decreased concentration, dysphoric mood and sleep disturbance. The patient is not nervous/anxious.           Objective:     BP (!) 148/90   Pulse (!) 50   Ht 6' 2" (1.88 m)   Wt 92.6 kg (204 lb 2.3 oz)   BMI 26.21 kg/m²        Constitutional:   He appears well-developed. He is cooperative. Normal speech.  No hoarse voice.      Head:  Normocephalic. Salivary glands normal.  Facial strength is normal.      Ears:    Right Ear: No drainage or tenderness. Tympanic membrane is not perforated. Tympanic membrane mobility is normal. No middle ear effusion. " No decreased hearing is noted.   Left Ear: No drainage or tenderness. Tympanic membrane is not perforated. Tympanic membrane mobility is normal.  No middle ear effusion. No decreased hearing is noted.     Nose:  Mucosal edema, rhinorrhea and septal deviation present. No polyps. No epistaxis. Turbinates normal, no turbinate masses and no turbinate hypertrophy.  Right sinus exhibits no maxillary sinus tenderness and no frontal sinus tenderness. Left sinus exhibits no maxillary sinus tenderness and no frontal sinus tenderness.     Mouth/Throat  Oropharynx clear and moist without lesions or asymmetry and normal uvula midline. He does not have dentures. Normal dentition. No oral lesions or mucous membrane lesions. No oropharyngeal exudate or posterior oropharyngeal erythema. Tonsils not present.  Mirror exam not performed due to patient tolerance.  Mirror exam not performed due to patient tolerance.      Neck:  Neck normal without thyromegaly masses, asymmetry, normal tracheal structure, crepitus, and tenderness, thyroid normal, trachea normal and no adenopathy. Normal range of motion present.     He has no cervical adenopathy.     Cardiovascular:   Regular rhythm.      Pulmonary/Chest:   Effort normal.     Psychiatric:   He has a normal mood and affect. His speech is normal and behavior is normal.     Neurological:   No cranial nerve deficit.     Skin:   No rash noted.       Procedure    Flexible laryngoscopy performed.  See procedure note.     Left nasal valve     Left MT with mild edema     Left posterior nasal cavity with clear mucus     Right nasal valve with septal spur     Right MT obscured by deviated septum     Larynx with mild reflux changes     Good mobility        Data Reviewed    WBC (K/uL)   Date Value   04/05/2018 7.79     Eosinophil% (%)   Date Value   04/05/2018 0.9     Eos # (K/uL)   Date Value   04/05/2018 0.1     Platelets (K/uL)   Date Value   04/05/2018 333     Glucose (mg/dL)   Date Value    12/13/2017 86     No results found for: IGE    No sinus imaging available.       Assessment:     1. Chronic maxillary sinusitis    2. Deviated nasal septum    3. Rhinorrhea    4. Laryngopharyngeal reflux (LPR)         Plan:     I had a long discussion with the patient regarding his condition and the further workup and management options.  I reassured him as to the benign nature of today's findings, though CSF rhinorrhea cannot be totally excluded.  We agreed upon empiric treatment for chronic rhinitis and sinusitis.  I prescribed a therapeutic course of amoxicillin for 14 days.  I prescribed the daily use of Flonase to treat sinonasal inflammation.  If not improved, we will obtain a CT sinuses.  I also suggested collection of CSF for beta-2 transferrin assay, though he believes this may not be practical.  I also suggested that he use omeprazole daily for control of reflux-related symptoms.    Follow-up in about 1 month (around 7/19/2018), or if symptoms worsen or fail to improve.

## 2018-06-19 NOTE — LETTER
2018    Brook Vasquez MD  [address] [place] [address]           OTOLARYNGOLOGY AND COMMUNICATION SCIENCES    Zain Aceves MD, FACS          SURGICAL AND MEDICAL DISEASES OF HEAD AND NECK  MD Zain Roberts MD, FACS  Jason Ayala III, MD    OTOLOGY, NEUROTOLOGY and SKULL-BASE SURGERY  Jag Hammond MD, FACS  Albert Espinoza MD, Director    PEDIATRIC OTOLARYNGOLOGY & OTOLOGY  LUIS Quinones MD, FAAP  Jimbo Ames MD, FACS    FACIAL PLASTIC and RECONSTRUCTIVE SURGERY  NICKOLAS Garcia III, MD, FACS    RHINOLOGY and SINUS SURGERY  Jose Luis Urrutia MD, MPH, FACS  Director   NICKOLAS Garcia III, MD, FACS    LARYNGOLOGY  Boris Lara MD    SPEECH-LANGUAGE PATHOLOGY  Margot Mosley, PhD, [facility]-SLP  Nazanin Adrian, MS, CCC-SLP  Haley Blackman, MS, CCC-SLP  Shwetha Barillas MA, [facility]-SLP    AUDIOLOGY SECTION  Elicia Barber, MS, CCC-A  PAN Abdullahi, CCC-A  Darvin Brock, CCC-A  Darvin Goss, CCC-A  Manuel Josue Jr., PAN, CCA-A  PAN Weber, CCC-A  Darvin Porter, CCC-A    [facility]  Head and Neck Surgical Oncology  RADHA Ty, FNP-C  Pedatric Otolaryngology  RADHA Ya, PNP-C       Re:  Ha Madrid  :  1950    Dear Dr. Vasquez,    Thank you for referring your patient, Ha Madrid, to us for evaluation and treatment.  I have enclosed a copy of my clinic note for your review and records.  If you have any questions please do not hesitate to contact our office.     Thank you for allowing me to participate in the care of your patient.    Sincerely,        Jose Luis Urrutia MD, MPH, FACS    Director, Rhinology and Sinus Surgery  Department of Otorhinolaryngology  [facility]    Encl:  Progress note       [facility] [address] [place] [address]  phone [phone]  fax [phone]   [email or web address]

## 2018-06-19 NOTE — PROCEDURES
Laryngoscopy  Date/Time: 6/19/2018 9:48 AM  Performed by: ROSENDA IDEGO  Authorized by: ROSENDA DIEGO     Consent Done?:  Yes (Verbal)  Anesthesia:     Local anesthetic:  Lidocaine 4% and Jesus-Synephrine 1/2%    Patient tolerance:  Patient tolerated the procedure well with no immediate complications  Laryngoscopy:     Areas examined:  Nasopharynx, oropharynx, hypopharynx, larynx, vocal cords and nasal cavities    Laryngoscope size:  4 mm  Nose Intranasal:      Mucosa no polyps     No mucosa lesions present     No septum gross deformity     Turbinates not enlarged  Nasopharynx:      No mucosa lesions     Adenoids not present     Posterior choanae patent     Eustachian tube patent  Larynx/hypopharynx:      No epiglottis lesions     No epiglottis edema     No AE folds lesions     No vocal cord polyps     Equal and normal bilateral     No hypopharynx lesions     No piriform sinus pooling     No piriform sinus lesions     Post cricoid edema     Post cricoid erythema     Mild left MT edema.  Marked rightward septal deviation.  No polyps or purulence.  Larynx with mild posterior inflammation.

## 2018-07-21 RX ORDER — ATORVASTATIN CALCIUM 20 MG/1
TABLET, FILM COATED ORAL
Qty: 90 TABLET | Refills: 3 | Status: SHIPPED | OUTPATIENT
Start: 2018-07-21 | End: 2018-12-18 | Stop reason: SDUPTHER

## 2018-07-30 ENCOUNTER — PATIENT MESSAGE (OUTPATIENT)
Dept: INTERNAL MEDICINE | Facility: CLINIC | Age: 68
End: 2018-07-30

## 2018-07-30 NOTE — TELEPHONE ENCOUNTER
The patient sent a message through the portal.   Please see message and advise.     Thank you    forms printed

## 2018-08-12 ENCOUNTER — TELEPHONE (OUTPATIENT)
Dept: INTERNAL MEDICINE | Facility: CLINIC | Age: 68
End: 2018-08-12

## 2018-08-15 ENCOUNTER — PATIENT MESSAGE (OUTPATIENT)
Dept: INTERNAL MEDICINE | Facility: CLINIC | Age: 68
End: 2018-08-15

## 2018-08-15 ENCOUNTER — TELEPHONE (OUTPATIENT)
Dept: INTERNAL MEDICINE | Facility: CLINIC | Age: 68
End: 2018-08-15

## 2018-09-12 DIAGNOSIS — J32.0 CHRONIC MAXILLARY SINUSITIS: ICD-10-CM

## 2018-09-12 RX ORDER — FLUTICASONE PROPIONATE 50 MCG
2 SPRAY, SUSPENSION (ML) NASAL DAILY
Qty: 16 G | Refills: 2 | Status: SHIPPED | OUTPATIENT
Start: 2018-09-12 | End: 2018-09-13 | Stop reason: SDUPTHER

## 2018-09-13 DIAGNOSIS — J32.0 CHRONIC MAXILLARY SINUSITIS: ICD-10-CM

## 2018-09-13 RX ORDER — FLUTICASONE PROPIONATE 50 MCG
2 SPRAY, SUSPENSION (ML) NASAL DAILY
Qty: 16 G | Refills: 2 | Status: SHIPPED | OUTPATIENT
Start: 2018-09-13 | End: 2018-11-08 | Stop reason: SDUPTHER

## 2018-11-06 ENCOUNTER — PATIENT MESSAGE (OUTPATIENT)
Dept: UROLOGY | Facility: CLINIC | Age: 68
End: 2018-11-06

## 2018-11-07 RX ORDER — TAMSULOSIN HYDROCHLORIDE 0.4 MG/1
0.4 CAPSULE ORAL DAILY
Qty: 90 CAPSULE | Refills: 3 | Status: SHIPPED | OUTPATIENT
Start: 2018-11-07 | End: 2019-12-19

## 2018-11-08 DIAGNOSIS — J32.0 CHRONIC MAXILLARY SINUSITIS: ICD-10-CM

## 2018-11-08 RX ORDER — FLUTICASONE PROPIONATE 50 MCG
2 SPRAY, SUSPENSION (ML) NASAL DAILY
Qty: 16 G | Refills: 2 | Status: SHIPPED | OUTPATIENT
Start: 2018-11-08 | End: 2019-04-09 | Stop reason: SDUPTHER

## 2018-11-19 DIAGNOSIS — Z86.010 HISTORY OF COLON POLYPS: Primary | ICD-10-CM

## 2018-11-21 DIAGNOSIS — Z12.11 COLON CANCER SCREENING: Primary | ICD-10-CM

## 2018-11-21 RX ORDER — POLYETHYLENE GLYCOL 3350, SODIUM SULFATE ANHYDROUS, SODIUM BICARBONATE, SODIUM CHLORIDE, POTASSIUM CHLORIDE 236; 22.74; 6.74; 5.86; 2.97 G/4L; G/4L; G/4L; G/4L; G/4L
4 POWDER, FOR SOLUTION ORAL ONCE
Qty: 4000 ML | Refills: 0 | Status: SHIPPED | OUTPATIENT
Start: 2018-11-21 | End: 2018-11-21

## 2018-12-18 ENCOUNTER — OFFICE VISIT (OUTPATIENT)
Dept: INTERNAL MEDICINE | Facility: CLINIC | Age: 68
End: 2018-12-18
Payer: MEDICARE

## 2018-12-18 ENCOUNTER — OFFICE VISIT (OUTPATIENT)
Dept: UROLOGY | Facility: CLINIC | Age: 68
End: 2018-12-18
Payer: MEDICARE

## 2018-12-18 ENCOUNTER — PATIENT MESSAGE (OUTPATIENT)
Dept: UROLOGY | Facility: CLINIC | Age: 68
End: 2018-12-18

## 2018-12-18 VITALS
HEART RATE: 60 BPM | SYSTOLIC BLOOD PRESSURE: 126 MMHG | HEIGHT: 75 IN | DIASTOLIC BLOOD PRESSURE: 80 MMHG | WEIGHT: 203 LBS | BODY MASS INDEX: 25.24 KG/M2

## 2018-12-18 VITALS
OXYGEN SATURATION: 98 % | HEIGHT: 75 IN | SYSTOLIC BLOOD PRESSURE: 124 MMHG | HEART RATE: 50 BPM | BODY MASS INDEX: 26.37 KG/M2 | DIASTOLIC BLOOD PRESSURE: 80 MMHG | WEIGHT: 212.06 LBS

## 2018-12-18 DIAGNOSIS — N40.1 BPH WITH URINARY OBSTRUCTION: Primary | ICD-10-CM

## 2018-12-18 DIAGNOSIS — I10 ESSENTIAL HYPERTENSION: Primary | ICD-10-CM

## 2018-12-18 DIAGNOSIS — D64.9 ANEMIA, UNSPECIFIED TYPE: Chronic | ICD-10-CM

## 2018-12-18 DIAGNOSIS — E78.00 PURE HYPERCHOLESTEROLEMIA: ICD-10-CM

## 2018-12-18 DIAGNOSIS — N13.8 BPH WITH URINARY OBSTRUCTION: Primary | ICD-10-CM

## 2018-12-18 DIAGNOSIS — Z12.5 PROSTATE CANCER SCREENING: ICD-10-CM

## 2018-12-18 PROCEDURE — 99213 OFFICE O/P EST LOW 20 MIN: CPT | Mod: PBBFAC | Performed by: UROLOGY

## 2018-12-18 PROCEDURE — 99999 PR PBB SHADOW E&M-EST. PATIENT-LVL III: CPT | Mod: PBBFAC,,, | Performed by: UROLOGY

## 2018-12-18 PROCEDURE — 99214 OFFICE O/P EST MOD 30 MIN: CPT | Mod: S$PBB,,, | Performed by: INTERNAL MEDICINE

## 2018-12-18 PROCEDURE — 99214 OFFICE O/P EST MOD 30 MIN: CPT | Mod: S$PBB,,, | Performed by: UROLOGY

## 2018-12-18 PROCEDURE — 99999 PR PBB SHADOW E&M-EST. PATIENT-LVL III: CPT | Mod: PBBFAC,,, | Performed by: INTERNAL MEDICINE

## 2018-12-18 PROCEDURE — 99213 OFFICE O/P EST LOW 20 MIN: CPT | Mod: PBBFAC,27 | Performed by: INTERNAL MEDICINE

## 2018-12-18 NOTE — PROGRESS NOTES
Subjective:       Patient ID: Ha Madrid is a 68 y.o. male.    Chief Complaint: No chief complaint on file.    HPI    Ha Madrid is a 68 y.o. male with PMHx of HTN here for his annual prostate evaluation. He had a negative TRUS with biopsy in 2016 when his PSA was 5.1. He has PSA labs pending today but his last PSA 11 months ago was 5.7. Notes nocturia up to 4-5x, weak stream, and has hesitancy. Stops drinking fluids at around 8 pm. He has been taking 0.4 mg Flomax qd for the past month and it has reduced nocturia to 2x.    Past Medical History:   Diagnosis Date    Allergy     Basal cell carcinoma, face     left mandible    GERD (gastroesophageal reflux disease)     Hypertension        Past Surgical History:   Procedure Laterality Date    COLONOSCOPY N/A 12/20/2013    Performed by L[facility] Pop [facility] at [facility] ([facility])    excision of basal cell ca on face      left hip      THYROIDECTOMY, PARTIAL      TONSILLECTOMY      VASECTOMY         Family History   Problem Relation Age of Onset    Cancer Mother     Melanoma Neg Hx        Social History     Socioeconomic History    Marital status:      Spouse name: Not on file    Number of children: Not on file    Years of education: Not on file    Highest education level: Not on file   Social Needs    Financial resource strain: Not on file    Food insecurity - worry: Not on file    Food insecurity - inability: Not on file    Transportation needs - medical: Not on file    Transportation needs - non-medical: Not on file   Occupational History    Occupation: Physician   Tobacco Use    Smoking status: Former Smoker     Packs/day: 0.50     Years: 15.00     Pack years: 7.50     Types: Cigarettes    Smokeless tobacco: Never Used   Substance and Sexual Activity    Alcohol use: No    Drug use: No    Sexual activity: Yes     Birth control/protection: Post-menopausal   Other Topics Concern    Not on file   Social History  Narrative    Not on file       Allergies:  No known allergies    Medications:    Current Outpatient Medications:     fluticasone (FLONASE) 50 mcg/actuation nasal spray, 2 sprays (100 mcg total) by Each Nare route once daily., Disp: 16 g, Rfl: 2    FLUZONE HIGH-DOSE 2018-19, PF, 180 mcg/0.5 mL vaccine, TO BE ADMINISTERED BY PHARMACIST FOR IMMUNIZATION, Disp: , Rfl: 0    lisinopril (PRINIVIL,ZESTRIL) 2.5 MG tablet, Take 2 tablets (5 mg total) by mouth once daily. Or per provider directions, Disp: 180 tablet, Rfl: 3    neomycin-polymyxin-dexamethasone (DEXACINE) 3.5-10,000-0.1 mg-unit/g-% Oint, Use daily to eyelids as needed, Disp: 3.5 g, Rfl: 0    tamsulosin (FLOMAX) 0.4 mg Cap, Take 1 capsule (0.4 mg total) by mouth once daily., Disp: 90 capsule, Rfl: 3    tadalafil (CIALIS) 20 MG Tab, Take 1 tablet (20 mg total) by mouth every other day. Take every other day as needed, Disp: 6 tablet, Rfl: 11    Review of Systems   Constitutional: Negative for activity change, appetite change, chills, diaphoresis, fatigue, fever and unexpected weight change.   HENT: Negative for congestion, dental problem, hearing loss, mouth sores, postnasal drip, rhinorrhea, sinus pressure and trouble swallowing.    Eyes: Negative for pain, discharge and itching.   Respiratory: Negative for apnea, cough, choking, chest tightness, shortness of breath and wheezing.    Cardiovascular: Negative for chest pain, palpitations and leg swelling.   Gastrointestinal: Negative for abdominal distention, abdominal pain, anal bleeding, blood in stool, constipation, diarrhea, nausea, rectal pain and vomiting.   Endocrine: Negative for polydipsia and polyuria.   Genitourinary: Negative for decreased urine volume, difficulty urinating, discharge, dysuria, enuresis, flank pain, frequency, genital sores, hematuria, penile pain, penile swelling and scrotal swelling.        Nocturia 2x, weak stream, and urinary hesitancy.   Musculoskeletal: Negative for  arthralgias, back pain and myalgias.   Skin: Negative for color change, rash and wound.   Neurological: Negative for dizziness, syncope, speech difficulty, light-headedness and headaches.   Hematological: Negative for adenopathy. Does not bruise/bleed easily.   Psychiatric/Behavioral: Negative for behavioral problems, confusion and sleep disturbance.       Objective:      Physical Exam   Constitutional: He appears well-developed.   HENT:   Head: Normocephalic.   Neck: Neck supple.   Cardiovascular: Normal rate.    Pulmonary/Chest: Effort normal.   Abdominal: Soft.   Genitourinary:   Genitourinary Comments: Prostate was smooth without nodularity. No rectal masses. 30-35 grams. Normal perineum.     Neurological: He is alert.   Skin: Skin is warm.     Psychiatric: He has a normal mood and affect.       Labs:     Ref Range & Units 11mo ago  (12/29/17) 11mo ago  (12/29/17)   PSA DIAGNOSTIC 0.00 - 4.00 ng/mL 5.7 Abnormally high   5.7 Abnormally high  CM     Assessment:       1. BPH with urinary obstruction        Plan:       Diagnoses and all orders for this visit:    BPH with urinary obstruction          Recommended increasing Flomax from qd to BID.  Discussed pros and cons of REZUM therapy. Patient will consider this and call if he wants it.  RTC 1 year with PSA.    [place], am acting as a scribe on this patient encounter in the presence and under the supervision of Dr. Mackey.    12/18/2018 10:50 AM    I, Dr. Mackey, personally performed the services described in this documentation.   All medical record entries made by the scribe were at my direction and in my presence.   I have reviewed the chart and agree that the record is accurate and complete.   Simba Mackey MD.  11:04 AM 12/18/2018

## 2018-12-24 ENCOUNTER — HOSPITAL ENCOUNTER (OUTPATIENT)
Facility: HOSPITAL | Age: 68
Discharge: HOME | End: 2018-12-24
Attending: COLON & RECTAL SURGERY | Admitting: COLON & RECTAL SURGERY
Payer: MEDICARE

## 2018-12-24 VITALS
RESPIRATION RATE: 16 BRPM | SYSTOLIC BLOOD PRESSURE: 156 MMHG | WEIGHT: 198 LBS | BODY MASS INDEX: 24.62 KG/M2 | HEART RATE: 50 BPM | OXYGEN SATURATION: 100 % | DIASTOLIC BLOOD PRESSURE: 83 MMHG | TEMPERATURE: 98 F | HEIGHT: 75 IN

## 2018-12-24 DIAGNOSIS — Z12.11 SCREENING FOR COLON CANCER: ICD-10-CM

## 2018-12-24 PROCEDURE — 88305 TISSUE EXAM BY PATHOLOGIST: CPT | Performed by: PATHOLOGY

## 2018-12-24 PROCEDURE — 27201012 HC FORCEPS, HOT/COLD, DISP: Performed by: COLON & RECTAL SURGERY

## 2018-12-24 PROCEDURE — C1773 RET DEV, INSERTABLE: HCPCS | Performed by: COLON & RECTAL SURGERY

## 2018-12-24 PROCEDURE — 45380 COLONOSCOPY AND BIOPSY: CPT | Mod: PT,,, | Performed by: COLON & RECTAL SURGERY

## 2018-12-24 PROCEDURE — 88305 TISSUE SPECIMEN TO PATHOLOGY - SURGERY: ICD-10-PCS | Mod: 26,,, | Performed by: PATHOLOGY

## 2018-12-24 PROCEDURE — 45380 PR COLONOSCOPY,BIOPSY: ICD-10-PCS | Mod: PT,,, | Performed by: COLON & RECTAL SURGERY

## 2018-12-24 PROCEDURE — 88305 TISSUE EXAM BY PATHOLOGIST: CPT | Mod: 26,,, | Performed by: PATHOLOGY

## 2018-12-24 PROCEDURE — 45380 COLONOSCOPY AND BIOPSY: CPT | Performed by: COLON & RECTAL SURGERY

## 2018-12-24 RX ORDER — ATORVASTATIN CALCIUM 20 MG/1
20 TABLET, FILM COATED ORAL DAILY
COMMUNITY
End: 2019-12-19 | Stop reason: SDUPTHER

## 2018-12-24 RX ORDER — SODIUM CHLORIDE 9 MG/ML
INJECTION, SOLUTION INTRAVENOUS CONTINUOUS
Status: DISCONTINUED | OUTPATIENT
Start: 2018-12-24 | End: 2018-12-24 | Stop reason: HOSPADM

## 2018-12-24 RX ORDER — SODIUM CHLORIDE 0.9 % (FLUSH) 0.9 %
3 SYRINGE (ML) INJECTION
Status: DISCONTINUED | OUTPATIENT
Start: 2018-12-24 | End: 2018-12-24 | Stop reason: HOSPADM

## 2018-12-24 RX ORDER — ASPIRIN 325 MG
50 TABLET, DELAYED RELEASE (ENTERIC COATED) ORAL DAILY
COMMUNITY
End: 2019-12-19

## 2018-12-24 RX ORDER — NAPROXEN SODIUM 220 MG
220 TABLET ORAL 2 TIMES DAILY WITH MEALS
COMMUNITY
End: 2019-12-19

## 2018-12-24 RX ORDER — ASPIRIN 81 MG/1
81 TABLET ORAL EVERY MORNING
COMMUNITY
End: 2021-09-17 | Stop reason: HOSPADM

## 2018-12-24 RX ORDER — MULTIVITAMIN
1 TABLET ORAL EVERY MORNING
COMMUNITY

## 2018-12-24 NOTE — [employer]OVATION PATIENT INSTRUCTIONS
Discharge Summary/Instructions after an Endoscopic Procedure  Patient Name: Ha Madrid  Patient MRN: [id number]  Patient YOB: 1950 Monday, December 24, 2018  Ryan Umanzor MD  RESTRICTIONS:  During your procedure today, you received medications for sedation.  These   medications may affect your judgment, balance and coordination.  Therefore,   for 24 hours, you have the following restrictions:   - DO NOT drive a car, operate machinery, make legal/financial decisions,   sign important papers or drink alcohol.    ACTIVITY:  Today: no heavy lifting, straining or running due to procedural   sedation/anesthesia.  The following day: return to full activity including work.  DIET:  Eat and drink normally unless instructed otherwise.     TREATMENT FOR COMMON SIDE EFFECTS:  - Mild abdominal pain, nausea, belching, bloating or excessive gas:  rest,   eat lightly and use a heating pad.  - Sore Throat: treat with throat lozenges and/or gargle with warm salt   water.  - Because air was used during the procedure, expelling large amounts of air   from your rectum or belching is normal.  - If a bowel prep was taken, you may not have a bowel movement for 1-3 days.    This is normal.  SYMPTOMS TO WATCH FOR AND REPORT TO YOUR PHYSICIAN:  1. Abdominal pain or bloating, other than gas cramps.  2. Chest pain.  3. Back pain.  4. Signs of infection such as: chills or fever occurring within 24 hours   after the procedure.  5. Rectal bleeding, which would show as bright red, maroon, or black stools.   (A tablespoon of blood from the rectum is not serious, especially if   hemorrhoids are present.)  6. Vomiting.  7. Weakness or dizziness.  GO DIRECTLY TO THE NEAREST EMERGENCY ROOM IF YOU HAVE ANY OF THE FOLLOWING:      Difficulty breathing              Chills and/or fever over 101 F   Persistent vomiting and/or vomiting blood   Severe abdominal pain   Severe chest pain   Black, tarry stools   Bleeding- more than one tablespoon   Any  other symptom or condition that you feel may need urgent attention  Your doctor recommends these additional instructions:  If any biopsies were taken, your doctors clinic will contact you in 1 to 2   weeks with any results.  - Discharge patient to home (ambulatory).   - Patient has a contact number available for emergencies.  The signs and   symptoms of potential delayed complications were discussed with the   patient.  Return to normal activities tomorrow.  Written discharge   instructions were provided to the patient.   - High fiber diet.   - Continue present medications.   - Repeat colonoscopy in 5 years for surveillance based on pathology   results.  For questions, problems or results please call your physician - Ryan Umanzor MD at Work:  [phone].  [facility], EMERGENCY ROOM PHONE NUMBER: [phone]  IF A COMPLICATION OR EMERGENCY SITUATION ARISES AND YOU ARE UNABLE TO REACH   YOUR PHYSICIAN - GO DIRECTLY TO THE EMERGENCY ROOM.  Ryan Umanzor MD  12/24/2018 8:42:56 AM  This report has been verified and signed electronically.  [employer]

## 2018-12-24 NOTE — H&P
Endoscopy H&P    Procedure : Colonoscopy      asymptomatic screening exam personal hx of advanced adenoma and family history of colon cancer (sister)      Past Medical History:   Diagnosis Date    Allergy     Basal cell carcinoma, face     left mandible    GERD (gastroesophageal reflux disease)     Hypertension      Sedation Problems: NO  Family History   Problem Relation Age of Onset    Cancer Mother     Colon cancer Sister     Melanoma Neg Hx      Fam Hx of Sedation Problems: NO  Social History     Socioeconomic History    Marital status:      Spouse name: Not on file    Number of children: Not on file    Years of education: Not on file    Highest education level: Not on file   Social Needs    Financial resource strain: Not on file    Food insecurity - worry: Not on file    Food insecurity - inability: Not on file    Transportation needs - medical: Not on file    Transportation needs - non-medical: Not on file   Occupational History    Occupation: Physician   Tobacco Use    Smoking status: Former Smoker     Packs/day: 0.50     Years: 15.00     Pack years: 7.50     Types: Cigarettes    Smokeless tobacco: Never Used   Substance and Sexual Activity    Alcohol use: No    Drug use: No    Sexual activity: Yes   Other Topics Concern    Not on file   Social History Narrative    Not on file         Review of Systems  Respiratory ROS: no cough, shortness of breath, or wheezing  Cardiovascular ROS: no chest pain or dyspnea on exertion  Gastrointestinal ROS: no abdominal pain, change in bowel habits, or black or bloody stools  Musculoskeletal ROS: negative  Neurological ROS: no TIA or stroke symptoms        Physical Exam:  General: well developed, well nourished, no distress  Head: normocephalic  Mallampati Score   Neck: supple, symmetrical, trachea midline  Lungs:  clear to auscultation bilaterally and normal respiratory  effort  Heart: regular rate and rhythm, S1, S2 normal, no murmur, rub or gallop  Abdomen: soft, non-tender non-distented; bowel sounds normal; no masses,  no organomegaly  Extremities: no cyanosis or edema, or clubbing    Sedation Plan : per anesthesia    II    Colonoscopy - details of the procedure, risks discussed with pt

## 2018-12-24 NOTE — PLAN OF CARE
"Patient requests no anesthesia for procedure. Dr. Umanzor aware. Dr. Umanzor states, "It's up to him if he wants an IV". Patient refuses IV.  "

## 2018-12-31 ENCOUNTER — TELEPHONE (OUTPATIENT)
Dept: ENDOSCOPY | Facility: HOSPITAL | Age: 68
End: 2018-12-31

## 2019-01-04 ENCOUNTER — PATIENT MESSAGE (OUTPATIENT)
Dept: SURGERY | Facility: CLINIC | Age: 69
End: 2019-01-04

## 2019-01-07 NOTE — PROGRESS NOTES
Pathology:  Benign non adenomatous tissue.      Prior hx of polyps and sister with colon cancer    Repeat colonoscopy in 5 years       If you have any questions or if I can clarify any of the above please contact me:    EPIC message  Mobile [phone]   Pager [phone]  email [email or web address]  Nurse Swapna Carias [phone]   Jessica Henderson:  [phone]    Sincerely  H, Ryan Umanzor MD, FACS, FASCRS  Staff Surgeon  Dept of Colon and Rectal Surgery

## 2019-01-18 ENCOUNTER — PES CALL (OUTPATIENT)
Dept: ADMINISTRATIVE | Facility: CLINIC | Age: 69
End: 2019-01-18

## 2019-04-09 DIAGNOSIS — J32.0 CHRONIC MAXILLARY SINUSITIS: ICD-10-CM

## 2019-04-10 RX ORDER — FLUTICASONE PROPIONATE 50 MCG
SPRAY, SUSPENSION (ML) NASAL
Qty: 16 G | Refills: 2 | Status: SHIPPED | OUTPATIENT
Start: 2019-04-10 | End: 2019-12-19 | Stop reason: SDUPTHER

## 2019-05-10 ENCOUNTER — PES CALL (OUTPATIENT)
Dept: ADMINISTRATIVE | Facility: CLINIC | Age: 69
End: 2019-05-10

## 2019-06-10 ENCOUNTER — PES CALL (OUTPATIENT)
Dept: ADMINISTRATIVE | Facility: CLINIC | Age: 69
End: 2019-06-10

## 2019-07-05 ENCOUNTER — PES CALL (OUTPATIENT)
Dept: ADMINISTRATIVE | Facility: CLINIC | Age: 69
End: 2019-07-05

## 2019-07-20 DIAGNOSIS — J32.0 CHRONIC MAXILLARY SINUSITIS: ICD-10-CM

## 2019-07-20 RX ORDER — FLUTICASONE PROPIONATE 50 MCG
SPRAY, SUSPENSION (ML) NASAL
Qty: 16 G | Refills: 2 | Status: SHIPPED | OUTPATIENT
Start: 2019-07-20 | End: 2019-12-19 | Stop reason: SDUPTHER

## 2019-08-08 RX ORDER — ATORVASTATIN CALCIUM 20 MG/1
TABLET, FILM COATED ORAL
Qty: 90 TABLET | Refills: 3 | Status: SHIPPED | OUTPATIENT
Start: 2019-08-08 | End: 2020-07-26

## 2019-09-16 DIAGNOSIS — N13.8 BPH WITH URINARY OBSTRUCTION: Primary | ICD-10-CM

## 2019-09-16 DIAGNOSIS — N40.1 BPH WITH URINARY OBSTRUCTION: Primary | ICD-10-CM

## 2019-09-22 ENCOUNTER — PATIENT MESSAGE (OUTPATIENT)
Dept: INTERNAL MEDICINE | Facility: CLINIC | Age: 69
End: 2019-09-22

## 2019-09-23 RX ORDER — OMEPRAZOLE 20 MG/1
20 CAPSULE, DELAYED RELEASE ORAL DAILY
Qty: 30 CAPSULE | Refills: 2 | Status: SHIPPED | OUTPATIENT
Start: 2019-09-23 | End: 2019-11-17 | Stop reason: SDUPTHER

## 2019-10-11 ENCOUNTER — TELEPHONE (OUTPATIENT)
Dept: INTERNAL MEDICINE | Facility: CLINIC | Age: 69
End: 2019-10-11

## 2019-10-11 DIAGNOSIS — I10 ESSENTIAL HYPERTENSION: Primary | ICD-10-CM

## 2019-10-25 RX ORDER — LISINOPRIL 2.5 MG/1
5 TABLET ORAL DAILY
Qty: 180 TABLET | Refills: 3 | Status: SHIPPED | OUTPATIENT
Start: 2019-10-25 | End: 2020-03-19 | Stop reason: DRUGHIGH

## 2019-11-02 DIAGNOSIS — J32.0 CHRONIC MAXILLARY SINUSITIS: ICD-10-CM

## 2019-11-04 RX ORDER — FLUTICASONE PROPIONATE 50 MCG
SPRAY, SUSPENSION (ML) NASAL
Qty: 16 G | Refills: 2 | Status: SHIPPED | OUTPATIENT
Start: 2019-11-04 | End: 2020-06-17

## 2019-11-17 RX ORDER — OMEPRAZOLE 20 MG/1
CAPSULE, DELAYED RELEASE ORAL
Qty: 30 CAPSULE | Refills: 2 | Status: SHIPPED | OUTPATIENT
Start: 2019-11-17 | End: 2020-03-19 | Stop reason: SDUPTHER

## 2019-12-16 ENCOUNTER — LAB VISIT (OUTPATIENT)
Dept: LAB | Facility: HOSPITAL | Age: 69
End: 2019-12-16
Attending: UROLOGY
Payer: MEDICARE

## 2019-12-16 DIAGNOSIS — N40.1 BPH WITH URINARY OBSTRUCTION: ICD-10-CM

## 2019-12-16 DIAGNOSIS — I10 ESSENTIAL HYPERTENSION: ICD-10-CM

## 2019-12-16 DIAGNOSIS — N13.8 BPH WITH URINARY OBSTRUCTION: ICD-10-CM

## 2019-12-16 LAB
ALBUMIN SERPL BCP-MCNC: 3.8 G/DL (ref 3.5–5.2)
ALP SERPL-CCNC: 83 U/L (ref 55–135)
ALT SERPL W/O P-5'-P-CCNC: 22 U/L (ref 10–44)
ANION GAP SERPL CALC-SCNC: 6 MMOL/L (ref 8–16)
AST SERPL-CCNC: 33 U/L (ref 10–40)
BASOPHILS # BLD AUTO: 0.02 K/UL (ref 0–0.2)
BASOPHILS NFR BLD: 0.3 % (ref 0–1.9)
BILIRUB SERPL-MCNC: 0.4 MG/DL (ref 0.1–1)
BUN SERPL-MCNC: 21 MG/DL (ref 8–23)
CALCIUM SERPL-MCNC: 9.4 MG/DL (ref 8.7–10.5)
CHLORIDE SERPL-SCNC: 105 MMOL/L (ref 95–110)
CHOLEST SERPL-MCNC: 144 MG/DL (ref 120–199)
CHOLEST/HDLC SERPL: 2.8 {RATIO} (ref 2–5)
CO2 SERPL-SCNC: 28 MMOL/L (ref 23–29)
COMPLEXED PSA SERPL-MCNC: 10.3 NG/ML (ref 0–4)
CREAT SERPL-MCNC: 1.2 MG/DL (ref 0.5–1.4)
DIFFERENTIAL METHOD: ABNORMAL
EOSINOPHIL # BLD AUTO: 0.2 K/UL (ref 0–0.5)
EOSINOPHIL NFR BLD: 2.8 % (ref 0–8)
ERYTHROCYTE [DISTWIDTH] IN BLOOD BY AUTOMATED COUNT: 13.5 % (ref 11.5–14.5)
EST. GFR  (AFRICAN AMERICAN): >60 ML/MIN/1.73 M^2
EST. GFR  (NON AFRICAN AMERICAN): >60 ML/MIN/1.73 M^2
GLUCOSE SERPL-MCNC: 89 MG/DL (ref 70–110)
HCT VFR BLD AUTO: 39.3 % (ref 40–54)
HDLC SERPL-MCNC: 51 MG/DL (ref 40–75)
HDLC SERPL: 35.4 % (ref 20–50)
HGB BLD-MCNC: 12.4 G/DL (ref 14–18)
IMM GRANULOCYTES # BLD AUTO: 0.01 K/UL (ref 0–0.04)
IMM GRANULOCYTES NFR BLD AUTO: 0.2 % (ref 0–0.5)
LDLC SERPL CALC-MCNC: 83 MG/DL (ref 63–159)
LYMPHOCYTES # BLD AUTO: 2.6 K/UL (ref 1–4.8)
LYMPHOCYTES NFR BLD: 42.2 % (ref 18–48)
MCH RBC QN AUTO: 29.9 PG (ref 27–31)
MCHC RBC AUTO-ENTMCNC: 31.6 G/DL (ref 32–36)
MCV RBC AUTO: 95 FL (ref 82–98)
MONOCYTES # BLD AUTO: 0.5 K/UL (ref 0.3–1)
MONOCYTES NFR BLD: 7.9 % (ref 4–15)
NEUTROPHILS # BLD AUTO: 2.9 K/UL (ref 1.8–7.7)
NEUTROPHILS NFR BLD: 46.6 % (ref 38–73)
NONHDLC SERPL-MCNC: 93 MG/DL
NRBC BLD-RTO: 0 /100 WBC
PLATELET # BLD AUTO: 398 K/UL (ref 150–350)
PMV BLD AUTO: 9.5 FL (ref 9.2–12.9)
POTASSIUM SERPL-SCNC: 4.6 MMOL/L (ref 3.5–5.1)
PROT SERPL-MCNC: 6.7 G/DL (ref 6–8.4)
RBC # BLD AUTO: 4.15 M/UL (ref 4.6–6.2)
SODIUM SERPL-SCNC: 139 MMOL/L (ref 136–145)
TRIGL SERPL-MCNC: 50 MG/DL (ref 30–150)
WBC # BLD AUTO: 6.18 K/UL (ref 3.9–12.7)

## 2019-12-16 PROCEDURE — 36415 COLL VENOUS BLD VENIPUNCTURE: CPT | Mod: PO

## 2019-12-16 PROCEDURE — 80061 LIPID PANEL: CPT

## 2019-12-16 PROCEDURE — 80053 COMPREHEN METABOLIC PANEL: CPT

## 2019-12-16 PROCEDURE — 84153 ASSAY OF PSA TOTAL: CPT

## 2019-12-16 PROCEDURE — 85025 COMPLETE CBC W/AUTO DIFF WBC: CPT

## 2019-12-19 ENCOUNTER — HOSPITAL ENCOUNTER (OUTPATIENT)
Dept: RADIOLOGY | Facility: HOSPITAL | Age: 69
Discharge: HOME | End: 2019-12-19
Attending: INTERNAL MEDICINE
Payer: MEDICARE

## 2019-12-19 ENCOUNTER — OFFICE VISIT (OUTPATIENT)
Dept: INTERNAL MEDICINE | Facility: CLINIC | Age: 69
End: 2019-12-19
Payer: MEDICARE

## 2019-12-19 ENCOUNTER — HOSPITAL ENCOUNTER (OUTPATIENT)
Dept: CARDIOLOGY | Facility: CLINIC | Age: 69
Discharge: HOME | End: 2019-12-19
Payer: MEDICARE

## 2019-12-19 VITALS
WEIGHT: 201.06 LBS | DIASTOLIC BLOOD PRESSURE: 84 MMHG | HEIGHT: 75 IN | BODY MASS INDEX: 25 KG/M2 | SYSTOLIC BLOOD PRESSURE: 132 MMHG | OXYGEN SATURATION: 96 % | HEART RATE: 50 BPM

## 2019-12-19 DIAGNOSIS — M54.6 CHRONIC THORACIC BACK PAIN, UNSPECIFIED BACK PAIN LATERALITY: ICD-10-CM

## 2019-12-19 DIAGNOSIS — G89.29 CHRONIC THORACIC BACK PAIN, UNSPECIFIED BACK PAIN LATERALITY: ICD-10-CM

## 2019-12-19 DIAGNOSIS — M79.644 FINGER PAIN, RIGHT: ICD-10-CM

## 2019-12-19 DIAGNOSIS — Z80.0 FAMILY HISTORY OF COLON CANCER: ICD-10-CM

## 2019-12-19 DIAGNOSIS — I10 ESSENTIAL HYPERTENSION: ICD-10-CM

## 2019-12-19 DIAGNOSIS — I10 ESSENTIAL HYPERTENSION: Primary | ICD-10-CM

## 2019-12-19 DIAGNOSIS — C44.310 BASAL CELL CARCINOMA, FACE: ICD-10-CM

## 2019-12-19 DIAGNOSIS — E78.5 HYPERLIPIDEMIA, UNSPECIFIED HYPERLIPIDEMIA TYPE: ICD-10-CM

## 2019-12-19 PROCEDURE — 72070 XR THORACIC SPINE AP LATERAL: ICD-10-PCS | Mod: 26,,, | Performed by: RADIOLOGY

## 2019-12-19 PROCEDURE — 72070 X-RAY EXAM THORAC SPINE 2VWS: CPT | Mod: TC

## 2019-12-19 PROCEDURE — 93010 EKG 12-LEAD: ICD-10-PCS | Mod: S$PBB,,, | Performed by: INTERNAL MEDICINE

## 2019-12-19 PROCEDURE — 1126F PR PAIN SEVERITY QUANTIFIED, NO PAIN PRESENT: ICD-10-PCS | Mod: ,,, | Performed by: INTERNAL MEDICINE

## 2019-12-19 PROCEDURE — 93005 ELECTROCARDIOGRAM TRACING: CPT | Mod: PBBFAC | Performed by: INTERNAL MEDICINE

## 2019-12-19 PROCEDURE — 71046 X-RAY EXAM CHEST 2 VIEWS: CPT | Mod: 26,,, | Performed by: RADIOLOGY

## 2019-12-19 PROCEDURE — 72070 X-RAY EXAM THORAC SPINE 2VWS: CPT | Mod: 26,,, | Performed by: RADIOLOGY

## 2019-12-19 PROCEDURE — 71046 XR CHEST PA AND LATERAL: ICD-10-PCS | Mod: 26,,, | Performed by: RADIOLOGY

## 2019-12-19 PROCEDURE — 99214 OFFICE O/P EST MOD 30 MIN: CPT | Mod: S$PBB,,, | Performed by: INTERNAL MEDICINE

## 2019-12-19 PROCEDURE — 1159F PR MEDICATION LIST DOCUMENTED IN MEDICAL RECORD: ICD-10-PCS | Mod: ,,, | Performed by: INTERNAL MEDICINE

## 2019-12-19 PROCEDURE — 99999 PR PBB SHADOW E&M-EST. PATIENT-LVL V: CPT | Mod: PBBFAC,,, | Performed by: INTERNAL MEDICINE

## 2019-12-19 PROCEDURE — 99999 PR PBB SHADOW E&M-EST. PATIENT-LVL V: ICD-10-PCS | Mod: PBBFAC,,, | Performed by: INTERNAL MEDICINE

## 2019-12-19 PROCEDURE — 99215 OFFICE O/P EST HI 40 MIN: CPT | Mod: PBBFAC,25 | Performed by: INTERNAL MEDICINE

## 2019-12-19 PROCEDURE — 71046 X-RAY EXAM CHEST 2 VIEWS: CPT | Mod: TC

## 2019-12-19 PROCEDURE — 99214 PR OFFICE/OUTPT VISIT, EST, LEVL IV, 30-39 MIN: ICD-10-PCS | Mod: S$PBB,,, | Performed by: INTERNAL MEDICINE

## 2019-12-19 PROCEDURE — 1126F AMNT PAIN NOTED NONE PRSNT: CPT | Mod: ,,, | Performed by: INTERNAL MEDICINE

## 2019-12-19 PROCEDURE — 1159F MED LIST DOCD IN RCRD: CPT | Mod: ,,, | Performed by: INTERNAL MEDICINE

## 2019-12-19 PROCEDURE — 93010 ELECTROCARDIOGRAM REPORT: CPT | Mod: S$PBB,,, | Performed by: INTERNAL MEDICINE

## 2019-12-19 RX ORDER — CHOLECALCIFEROL (VITAMIN D3) 25 MCG
2000 TABLET ORAL EVERY MORNING
COMMUNITY

## 2019-12-19 NOTE — PROGRESS NOTES
Subjective:      Patient ID: Ha Madrid is a 69 y.o. male.    Chief Complaint: Annual Exam    HPI:  HPI     Hypertension:  The patient states that he does check his blood pressure and at home finds that it is controlled less than 130/80..  He has had no symptoms of any disease state related to this.  He has been compliant with his medications.     Hyperlipidemia:  The patient is on atorvastatin within excellent response.     Gerd:  Patient has not required any medicine for reflux.     Family history of colon cancer: sister cecal cancer    Patient has had significant morning hand stiffness and a trigger finger with significant pain in one of the fingers.    He states that he has had sharp pain to the inferior aspect of the right scapula, which is sharp.        Annual review: 12/19//2019  Colonoscopy: done 12/2018  Prostate:Dr. Graves  Optho: follow up discussed  Flu: declined  Tetanus: up to date  Shingrix: declined  Pneumovax: done       Patient Active Problem List   Diagnosis    Anemia    Sleep difficulties: aggravated by leg discomfort ( anterior thigh) Ambien with an Aleve    Hypertension    Hyperlipidemia    Abnormal CT lung screening: follow up pulmonary nodule    Thyroid condition: calcifications, 1979 thyroid lobectomy    Colon polyps:12/13 follow up in 5 years    Special screening for malignant neoplasms, colon    Personal history of colonic polyps    Basal cell carcinoma, face    Screening for colon cancer    Family history of colon cancer     Past Medical History:   Diagnosis Date    Allergy     Basal cell carcinoma, face     left mandible    GERD (gastroesophageal reflux disease)     Hypertension      Past Surgical History:   Procedure Laterality Date    COLONOSCOPY N/A 12/24/2018    Procedure: COLONOSCOPY;  Surgeon: NICKOLAS Umanzor MD;  Location: [address]);  Service: Endoscopy;  Laterality: N/A;  Dr. Pop patient however patient wants December (needs after 12/20)  "and wants Dr. Umanzor if  cannot do December/ pt okay with Dec 24th/ wants NO SEDATION - sm    excision of basal cell ca on face      left hip      THYROIDECTOMY, PARTIAL      TONSILLECTOMY      VASECTOMY       Family History   Problem Relation Age of Onset    Cancer Mother     Colon cancer Sister     Melanoma Neg Hx      Review of Systems   Constitutional: Negative for activity change and unexpected weight change.   HENT: Negative for hearing loss, rhinorrhea and trouble swallowing.    Eyes: Negative for discharge and visual disturbance.   Respiratory: Negative for chest tightness and wheezing.    Cardiovascular: Negative for chest pain and palpitations.   Gastrointestinal: Negative for blood in stool, constipation, diarrhea and vomiting.   Endocrine: Negative for polydipsia and polyuria.   Genitourinary: Negative for difficulty urinating, hematuria and urgency.   Musculoskeletal: Negative for arthralgias, joint swelling and neck pain.   Neurological: Negative for weakness and headaches.   Psychiatric/Behavioral: Negative for confusion and dysphoric mood.     Objective:     Vitals:    12/19/19 1118 12/19/19 1134   BP: (!) 160/88 132/84   Pulse: (!) 50    SpO2: 96%    Weight: 91.2 kg (201 lb 1 oz)    Height: 6' 3" (1.905 m)    PainSc: 0-No pain      Body mass index is 25.13 kg/m².  Physical Exam   Constitutional: He is oriented to person, place, and time. He appears well-developed and well-nourished. No distress.   Neck: Carotid bruit is not present. No thyromegaly present.   Cardiovascular: Normal rate, regular rhythm and normal heart sounds. PMI is not displaced.   Pulmonary/Chest: Effort normal and breath sounds normal. No respiratory distress.   Abdominal: Soft. Bowel sounds are normal. He exhibits no distension. There is no tenderness.   Musculoskeletal: He exhibits no edema.   Neurological: He is alert and oriented to person, place, and time.     Assessment:     1. Essential hypertension    2. " Finger pain, right    3. Chronic thoracic back pain, unspecified back pain laterality    4. Family history of colon cancer    5. Basal cell carcinoma, face    6. Hyperlipidemia, unspecified hyperlipidemia type      Plan:   Ha was seen today for annual exam.    Diagnoses and all orders for this visit:    Essential hypertension  Comments:  Described a rare rhythm change, no side effects  Orders:  -     EKG 12-lead; Future    Finger pain, right  -     Ambulatory referral/consult to Orthopedics; Future    Chronic thoracic back pain, unspecified back pain laterality  -     X-Ray Chest PA And Lateral; Future  -     X-Ray Thoracic Spine AP Lateral; Future  -     Ambulatory Consult to [facility]    Family history of colon cancer    Basal cell carcinoma, face    Hyperlipidemia, unspecified hyperlipidemia type        Problem List Items Addressed This Visit     Hypertension - Primary    Relevant Orders    EKG 12-lead    Hyperlipidemia    Basal cell carcinoma, face    Family history of colon cancer      Other Visit Diagnoses     Finger pain, right        Relevant Orders    Ambulatory referral/consult to Orthopedics    Chronic thoracic back pain, unspecified back pain laterality        Relevant Orders    X-Ray Chest PA And Lateral    X-Ray Thoracic Spine AP Lateral    Ambulatory Consult to [facility] Sp[facility] Clinic        Orders Placed This Encounter   Procedures    X-Ray Chest PA And Lateral     Standing Status:   Future     Standing Expiration Date:   2/17/2020    X-Ray Thoracic Spine AP Lateral     Standing Status:   Future     Standing Expiration Date:   1/1/2021    Ambulatory referral/consult to Orthopedics     Standing Status:   Future     Standing Expiration Date:   1/19/2021     Referral Priority:   Routine     Referral Type:   Consultation     Requested Specialty:   Orthopedic Surgery     Number of Visits Requested:   1    Ambulatory Consult to [facility]     Referral Priority:   Routine     Referral  Type:   Consultation     Referral Reason:   Specialty Services Required     Number of Visits Requested:   1    EKG 12-lead     Standing Status:   Future     Standing Expiration Date:   12/18/2020     Order Specific Question:   Diagnosis     Answer:   Essential hypertension [818279]     No follow-ups on file.     Medication List           Accurate as of December 19, 2019 11:57 AM. If you have any questions, ask your nurse or doctor.               CONTINUE taking these medications    aspirin 81 MG EC tablet  Commonly known as:  ECOTRIN     atorvastatin 20 MG tablet  Commonly known as:  LIPITOR  TAKE 1 TABLET ONCE DAILY     fluticasone propionate 50 mcg/actuation nasal spray  Commonly known as:  FLONASE  USE 2 SPRAYS (=100 MCG     TOTAL) IN EACH NOSTRIL ONCEDAILY     Fluzone High-Dose 2018-19 (PF) 180 mcg/0.5 mL vaccine  Generic drug:  influenza     lisinopril 2.5 MG tablet  Commonly known as:  PRINIVIL,ZESTRIL  Take 2 tablets (5 mg total) by mouth once daily. Or per provider directions     multivitamin per tablet  Commonly known as:  THERAGRAN     neomycin-polymyxin-dexamethasone 3.5 mg/g-10,000 unit/g-0.1 % Oint  Commonly known as:  DEXACINE  Use daily to eyelids as needed     omeprazole 20 MG capsule  Commonly known as:  PRILOSEC  TAKE 1 CAPSULE BY MOUTH EVERY DAY     vitamin D 1000 units Tab  Commonly known as:  VITAMIN D3

## 2019-12-23 ENCOUNTER — TELEPHONE (OUTPATIENT)
Dept: UROLOGY | Facility: CLINIC | Age: 69
End: 2019-12-23

## 2019-12-23 DIAGNOSIS — R97.20 ELEVATED PSA: Primary | ICD-10-CM

## 2019-12-23 DIAGNOSIS — D49.59 NEOPLASM OF PROSTATE: ICD-10-CM

## 2019-12-23 NOTE — TELEPHONE ENCOUNTER
message sent to dr [facility] to confirm that mri can be done with hip replacement.     Call placed to mri to confirm that hip replacement will not distort images . P/ radiology tech, they will adjust mri strength of test to accommodate hip replacement

## 2019-12-23 NOTE — TELEPHONE ENCOUNTER
----- Message from Yasemin Leon sent at 12/23/2019 12:34 PM CST -----  Contact: pt#[phone]  Pt is calling to speak with Isabella about mri. He states that he has a hip replacement and instructions states to info doctor

## 2019-12-24 ENCOUNTER — TELEPHONE (OUTPATIENT)
Dept: ORTHOPEDICS | Facility: CLINIC | Age: 69
End: 2019-12-24

## 2019-12-24 NOTE — TELEPHONE ENCOUNTER
----- Message from Yesenia Nevarez LPN sent at 12/23/2019  1:21 PM CST -----      ----- Message -----  From: Isabella Howell LPN  Sent: 12/23/2019  12:52 PM CST  To: [facility], #    Dr sam would like dr saha to have an mri of the prostate. Dr saha stated he had a hip replacement about 10 years ago by dr [facility] . Can he have a mri?          [facility] sent Dr Mackey a note

## 2019-12-30 ENCOUNTER — TELEPHONE (OUTPATIENT)
Dept: UROLOGY | Facility: CLINIC | Age: 69
End: 2019-12-30

## 2020-01-09 ENCOUNTER — OFFICE VISIT (OUTPATIENT)
Dept: ORTHOPEDICS | Facility: CLINIC | Age: 70
End: 2020-01-09
Payer: MEDICARE

## 2020-01-09 VITALS — BODY MASS INDEX: 24.99 KG/M2 | HEIGHT: 75 IN | WEIGHT: 201 LBS

## 2020-01-09 DIAGNOSIS — M79.644 FINGER PAIN, RIGHT: ICD-10-CM

## 2020-01-09 DIAGNOSIS — M65.341 TRIGGER RING FINGER OF RIGHT HAND: ICD-10-CM

## 2020-01-09 PROCEDURE — 99203 PR OFFICE/OUTPT VISIT, NEW, LEVL III, 30-44 MIN: ICD-10-PCS | Mod: S$PBB,25,, | Performed by: ORTHOPAEDIC SURGERY

## 2020-01-09 PROCEDURE — 99213 OFFICE O/P EST LOW 20 MIN: CPT | Mod: PBBFAC,PN | Performed by: ORTHOPAEDIC SURGERY

## 2020-01-09 PROCEDURE — 20550 NJX 1 TENDON SHEATH/LIGAMENT: CPT | Mod: PBBFAC,PN | Performed by: ORTHOPAEDIC SURGERY

## 2020-01-09 PROCEDURE — 1125F AMNT PAIN NOTED PAIN PRSNT: CPT | Mod: ,,, | Performed by: ORTHOPAEDIC SURGERY

## 2020-01-09 PROCEDURE — 1125F PR PAIN SEVERITY QUANTIFIED, PAIN PRESENT: ICD-10-PCS | Mod: ,,, | Performed by: ORTHOPAEDIC SURGERY

## 2020-01-09 PROCEDURE — 20550 NJX 1 TENDON SHEATH/LIGAMENT: CPT | Mod: S$PBB,F8,, | Performed by: ORTHOPAEDIC SURGERY

## 2020-01-09 PROCEDURE — 99999 PR PBB SHADOW E&M-EST. PATIENT-LVL III: ICD-10-PCS | Mod: PBBFAC,,, | Performed by: ORTHOPAEDIC SURGERY

## 2020-01-09 PROCEDURE — 1159F MED LIST DOCD IN RCRD: CPT | Mod: ,,, | Performed by: ORTHOPAEDIC SURGERY

## 2020-01-09 PROCEDURE — 99999 PR PBB SHADOW E&M-EST. PATIENT-LVL III: CPT | Mod: PBBFAC,,, | Performed by: ORTHOPAEDIC SURGERY

## 2020-01-09 PROCEDURE — 99203 OFFICE O/P NEW LOW 30 MIN: CPT | Mod: S$PBB,25,, | Performed by: ORTHOPAEDIC SURGERY

## 2020-01-09 PROCEDURE — 20550 PR INJECT TENDON SHEATH/LIGAMENT: ICD-10-PCS | Mod: S$PBB,F8,, | Performed by: ORTHOPAEDIC SURGERY

## 2020-01-09 PROCEDURE — 1159F PR MEDICATION LIST DOCUMENTED IN MEDICAL RECORD: ICD-10-PCS | Mod: ,,, | Performed by: ORTHOPAEDIC SURGERY

## 2020-01-09 RX ORDER — ACETAMINOPHEN 160 MG/5ML
200 SUSPENSION, ORAL (FINAL DOSE FORM) ORAL EVERY MORNING
COMMUNITY

## 2020-01-09 RX ORDER — TRIAMCINOLONE ACETONIDE 40 MG/ML
20 INJECTION, SUSPENSION INTRA-ARTICULAR; INTRAMUSCULAR
Status: COMPLETED | OUTPATIENT
Start: 2020-01-09 | End: 2020-01-09

## 2020-01-09 RX ORDER — MAGNESIUM GLUCONATE 27.5 (500)
1 TABLET ORAL EVERY MORNING
COMMUNITY

## 2020-01-09 RX ADMIN — TRIAMCINOLONE ACETONIDE 20 MG: 40 INJECTION, SUSPENSION INTRA-ARTICULAR; INTRAMUSCULAR at 03:01

## 2020-01-09 NOTE — PROGRESS NOTES
Subjective:      Patient ID: Ha Madrid is a 69 y.o. male.    Chief Complaint: Joint Pain of the Right Hand      HPI  Ha Madrid is a  69 y.o. male presenting today for painful locking the right ring finger and numbness left hand.  There was not a history of trauma.  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      Review of patient's allergies indicates:   Allergen Reactions    No known allergies          Current Outpatient Medications   Medication Sig Dispense Refill    aspirin (ECOTRIN) 81 MG EC tablet Take 81 mg by mouth once daily.      atorvastatin (LIPITOR) 20 MG tablet TAKE 1 TABLET ONCE DAILY 90 tablet 3    coenzyme Q10 200 mg capsule       fluticasone propionate (FLONASE) 50 mcg/actuation nasal spray USE 2 SPRAYS (=100 MCG     TOTAL) IN EACH NOSTRIL ONCEDAILY 16 g 2    lisinopril (PRINIVIL,ZESTRIL) 2.5 MG tablet Take 2 tablets (5 mg total) by mouth once daily. Or per provider directions 180 tablet 3    magnesium gluconate 27.5 mg magne- sium (500 mg) Tab Take by mouth once.      multivitamin (THERAGRAN) per tablet Take 1 tablet by mouth once daily.      naproxen sodium (ALEVE ORAL) Take 400 tablets by mouth 2 (two) times daily.      neomycin-polymyxin-dexamethasone (DEXACINE) 3.5-10,000-0.1 mg-unit/g-% Oint Use daily to eyelids as needed 3.5 g 0    omeprazole (PRILOSEC) 20 MG capsule TAKE 1 CAPSULE BY MOUTH EVERY DAY 30 capsule 2    riboflavin, vitamin B2, (RIBOFLAVIN ORAL) Take 100 mg by mouth.      vitamin D (VITAMIN D3) 1000 units Tab Take 2,000 Units by mouth once daily.       No current facility-administered medications for this visit.        Past Medical History:  "  Diagnosis Date    Allergy     Basal cell carcinoma, face     left mandible    GERD (gastroesophageal reflux disease)     Hypertension        Past Surgical History:   Procedure Laterality Date    COLONOSCOPY N/A 12/24/2018    Procedure: COLONOSCOPY;  Surgeon: NICKOLAS Umanzor MD;  Location: [facility] ([address] [place] [address]);  Service: Endoscopy;  Laterality: N/A;  Dr. Pop patient however patient wants December (needs after 12/20) and wants Dr. Umanzor if  cannot do December/ pt okay with Dec 24th/ wants NO SEDATION - sm    excision of basal cell ca on face      left hip      THYROIDECTOMY, PARTIAL      TONSILLECTOMY      VASECTOMY         Review of Systems:  ROS    OBJECTIVE:     PHYSICAL EXAM:  Height: 6' 3" (190.5 cm) Weight: 91.2 kg (201 lb)  Vitals:    01/09/20 1436   Weight: 91.2 kg (201 lb)   Height: 6' 3" (1.905 m)   PainSc:   4     Well developed, well nourished male in no acute distress  Alert and oriented x 3  HEENT- Normal exam  Lungs- Clear to auscultation  Heart- Regular rate and rhythm  Abdomen- Soft nontender  Extremity exam- examination right hand demonstrates tenderness over the flexor tendon sheath right ring finger mild triggering  strength decreased sensation intact  Tinel sign negative  Left hand demonstrates full range of motion digits without triggering but he does have slightly decreased sensation left ring and small and a positive Tinel sign left elbow    RADIOGRAPHS:  None  Comments: I have personally reviewed the imaging and I agree with the above radiologist's report.    ASSESSMENT/PLAN:     IMPRESSION:  1.  Triggering right ring finger  2.  Left cubital tunnel syndrome    PLAN:  I explained the nature of the problem to the patient discussed options including injection versus surgery  Patient would like try injection today  After pause for time-out identified the right ring finger injected flexor tendon sheath combination Kenalog 20 mg 0.5 cc xylocaine sterile " technique  Tolerated the procedure well without complication  I have also ordered a nerve conduction study left arm to check for cubital tunnel syndrome  Follow-up after the nerve test is complete       - We talked at length about the anatomy and pathophysiology of   Encounter Diagnosis   Name Primary?    Finger pain, right            Disclaimer: This note has been generated using voice-recognition software. There may be typographical errors that have been missed during proof-reading.

## 2020-01-10 ENCOUNTER — HOSPITAL ENCOUNTER (OUTPATIENT)
Dept: RADIOLOGY | Facility: HOSPITAL | Age: 70
Discharge: HOME | End: 2020-01-10
Attending: UROLOGY
Payer: MEDICARE

## 2020-01-10 DIAGNOSIS — R97.20 ELEVATED PSA: ICD-10-CM

## 2020-01-10 PROCEDURE — 72197 MRI PELVIS W/O & W/DYE: CPT | Mod: 26,,, | Performed by: RADIOLOGY

## 2020-01-10 PROCEDURE — 25500020 PHARM REV CODE 255: Performed by: UROLOGY

## 2020-01-10 PROCEDURE — 72197 MRI PELVIS W/O & W/DYE: CPT | Mod: TC

## 2020-01-10 PROCEDURE — 72197 MRI PROSTATE W W/O CONTRAST: ICD-10-PCS | Mod: 26,,, | Performed by: RADIOLOGY

## 2020-01-10 PROCEDURE — A9585 GADOBUTROL INJECTION: HCPCS | Performed by: UROLOGY

## 2020-01-10 RX ORDER — GADOBUTROL 604.72 MG/ML
10 INJECTION INTRAVENOUS
Status: COMPLETED | OUTPATIENT
Start: 2020-01-10 | End: 2020-01-10

## 2020-01-10 RX ADMIN — GADOBUTROL 10 ML: 604.72 INJECTION INTRAVENOUS at 01:01

## 2020-01-20 ENCOUNTER — TELEPHONE (OUTPATIENT)
Dept: UROLOGY | Facility: CLINIC | Age: 70
End: 2020-01-20

## 2020-01-20 DIAGNOSIS — R97.20 ELEVATED PSA: Primary | ICD-10-CM

## 2020-01-20 NOTE — TELEPHONE ENCOUNTER
----- Message from Asiya Sims LPN sent at 1/17/2020  1:26 PM CST -----  Contact: pt: [phone]      ----- Message -----  From: Alicia Pryor  Sent: 1/17/2020  12:48 PM CST  To: Key BRISENO Jr Staff    Pt returning a call from yesterday from Isabella       Please contact pt: [phone]

## 2020-01-21 RX ORDER — CIPROFLOXACIN 500 MG/1
500 TABLET ORAL 2 TIMES DAILY
Qty: 6 TABLET | Refills: 0 | Status: SHIPPED | OUTPATIENT
Start: 2020-01-21 | End: 2020-01-24

## 2020-01-21 NOTE — TELEPHONE ENCOUNTER
Elevated PSA  -     ciprofloxacin HCl (CIPRO) 500 MG tablet; Take 1 tablet (500 mg total) by mouth 2 (two) times daily. for 6 doses  Dispense: 6 tablet; Refill: 0  -     sodium phosphates (FLEET ENEMA) 19-7 gram/118 mL Enem; Place 1 enema rectally once. for 1 dose  Dispense: 1 enema; Refill: 1  -     Transrectal Ultrasound w/ Biopsy; Future  -     Specimen to Pathology Breast; Future; Expected date: 01/21/2020  -     Prior Authorization Order      Lab Results   Component Value Date    PSA 6.4 (H) 12/18/2018    PSADIAG 10.3 (H) 12/16/2019     MRI of prostate  1. Severely limited examination secondary to metallic hardware artifact from patient's left hip prosthesis, with essentially nondiagnostic diffusion-weighted imaging portion of the examination.  2. Questionable T2 hypointense lesion within the left prostatic base posterior peripheral zone laterally.  Overall Assessment: PIRADS score is unable to be obtained.    Number of targets created for potential MR/US fusion biopsy    Peripheral zone: 1.    Transition zone: 0.    stop ASA 1 wk before.  Please schedule him for UroNav bx of prostate

## 2020-01-21 NOTE — TELEPHONE ENCOUNTER
Pt notified. He would like dr morrow to perform the uronav. All info sent to dr morrow for scheduling

## 2020-02-04 ENCOUNTER — TELEPHONE (OUTPATIENT)
Dept: UROLOGY | Facility: CLINIC | Age: 70
End: 2020-02-04

## 2020-02-04 NOTE — TELEPHONE ENCOUNTER
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 Mackey. He states that he has had his PSA in December, he has his MRI done as well and has been seen by Oncology.  He is not sure if he needs this appt or not but wants to be sure. He can be reached at [phone]

## 2020-02-11 ENCOUNTER — PROCEDURE VISIT (OUTPATIENT)
Dept: UROLOGY | Facility: CLINIC | Age: 70
End: 2020-02-11
Payer: MEDICARE

## 2020-02-11 VITALS
TEMPERATURE: 98 F | HEART RATE: 56 BPM | SYSTOLIC BLOOD PRESSURE: 179 MMHG | HEIGHT: 75 IN | WEIGHT: 209 LBS | DIASTOLIC BLOOD PRESSURE: 81 MMHG | RESPIRATION RATE: 17 BRPM | BODY MASS INDEX: 25.99 KG/M2

## 2020-02-11 DIAGNOSIS — R97.20 ELEVATED PSA: Primary | ICD-10-CM

## 2020-02-11 PROCEDURE — 55700 TRANSRECTAL ULTRASOUND W/ BIOPSY: ICD-10-PCS | Mod: S$PBB,,, | Performed by: UROLOGY

## 2020-02-11 PROCEDURE — 76942 ECHO GUIDE FOR BIOPSY: CPT | Mod: PBBFAC | Performed by: UROLOGY

## 2020-02-11 PROCEDURE — 96372 THER/PROPH/DIAG INJ SC/IM: CPT | Mod: PBBFAC,59

## 2020-02-11 PROCEDURE — 76872 US TRANSRECTAL: CPT | Mod: PBBFAC | Performed by: UROLOGY

## 2020-02-11 PROCEDURE — 88305 TISSUE EXAM BY PATHOLOGIST: ICD-10-PCS | Mod: 26,,, | Performed by: PATHOLOGY

## 2020-02-11 PROCEDURE — 88305 TISSUE EXAM BY PATHOLOGIST: CPT | Mod: 26,,, | Performed by: PATHOLOGY

## 2020-02-11 PROCEDURE — 76872 PR US TRANSRECTAL: ICD-10-PCS | Mod: 26,S$PBB,, | Performed by: UROLOGY

## 2020-02-11 PROCEDURE — 55700 TRANSRECTAL ULTRASOUND W/ BIOPSY: CPT | Mod: PBBFAC | Performed by: UROLOGY

## 2020-02-11 PROCEDURE — 76872 US TRANSRECTAL: CPT | Mod: 26,S$PBB,, | Performed by: UROLOGY

## 2020-02-11 PROCEDURE — 88305 TISSUE EXAM BY PATHOLOGIST: CPT | Performed by: PATHOLOGY

## 2020-02-11 RX ORDER — LIDOCAINE HYDROCHLORIDE 10 MG/ML
20 INJECTION INFILTRATION; PERINEURAL
Status: COMPLETED | OUTPATIENT
Start: 2020-02-11 | End: 2020-02-11

## 2020-02-11 RX ORDER — LIDOCAINE HYDROCHLORIDE 20 MG/ML
JELLY TOPICAL ONCE
Status: COMPLETED | OUTPATIENT
Start: 2020-02-11 | End: 2020-02-11

## 2020-02-11 RX ORDER — CEFTRIAXONE 1 G/1
1 INJECTION, POWDER, FOR SOLUTION INTRAMUSCULAR; INTRAVENOUS ONCE
Status: COMPLETED | OUTPATIENT
Start: 2020-02-11 | End: 2020-02-11

## 2020-02-11 RX ADMIN — CEFTRIAXONE SODIUM 1 G: 1 INJECTION, POWDER, FOR SOLUTION INTRAMUSCULAR; INTRAVENOUS at 09:02

## 2020-02-11 RX ADMIN — LIDOCAINE HYDROCHLORIDE 20 ML: 10 INJECTION, SOLUTION INFILTRATION; PERINEURAL at 09:02

## 2020-02-11 RX ADMIN — LIDOCAINE HYDROCHLORIDE: 20 JELLY TOPICAL at 09:02

## 2020-02-11 NOTE — PROCEDURES
Transrectal Ultrasound w/ Biopsy  Date/Time: 2/11/2020 8:45 AM  Performed by: Dallin Buenrostro MD  Authorized by: Dallin Buenrostro MD     Total Biopsies:  0     Procedure Date:  02/11/2020    Procedure:   UroNav MRI/US fusion biopsy of the prostate                                                                     Transrectal Ultrasound of the Prostate                                       Transrectal Ultrasound Guided Prostate Biopsy                                - With Pudendal Nerve Block                                                                                      Pre-OP Diagnosis:                                                                 Elevated PSA, prostate lesions                                              Post-OP Diagnosis:                                                                Awaiting final pathology                                                Anesthesia:                                                                       Anesthesia Administered:                                                     Intrarectal instillation of 10 mL 2% lidocaine (Xylocaine) jelly.       Findings:                                                                         --- Transrectal Ultrasound of the Prostate ---                               Prostate measurements.                                                                                               PSA: Lab Results       Component                Value               Date                       PSA                      6.4 (H)             12/18/2018                 PSADIAG                  10.3 (H)            12/16/2019                   - Volume:Lab Results       Component                Value               Date                       PSA                      6.4 (H)             12/18/2018                 PSADIAG                  10.3 (H)            12/16/19               Prostate volume: 54 x 38 x 54 = 58 gm.   ( mild intravesical lobe seen  on TRUS)          PSA Density:     0.18                                                     no calcification in the prostate    MRI of prostate  FINDINGS:  The diffusion-weighted portion of the examination is nondiagnostic, and the remainder of the examination is severely limited secondary to metallic hardware artifact from the patient's left hip prosthesis.  Previous biopsy: Negative for malignancy (2016).  PSA: 10.3 (12/16/2019), 5.7 (12/29/2017).  Prior therapy: None.  Prostate: The prostate measures 5.9 x 4.2 x 4.3cm corresponding to a computed volume of 46cc.    Peripheral zone:  There is a questionable T2 hypointense lesion within the left prostatic base posterior peripheral zone laterally measuring up to 1.5 cm in maximal dimension.  Lesion is incompletely characterized secondary to aforementioned limitation.    Transition zone: No definite lesion with imaging features concerning for prostate cancer is identified.  Neurovascular bundle: Poorly evaluated.  Seminal vesicles: Poorly evaluated.  Adjacent Organ Involvement: There is no focal bladder wall thickening. There is no rectal involvement.  Lymphadenopathy: None.  Other Findings: Diffuse diverticulosis of the sigmoid colon. Bilateral fat containing inguinal hernias.  There is bilateral L5 spondylolysis with grade 1 anterolisthesis of L5 on S1.                          Description of Procedure:                                                         Informed Consent:                                                            - Risks, benefits and alternatives of procedure discussed with               patient and informed consent obtained.                                       Patient Position:                                                            - Left lateral.                                                              --- Transrectal Ultrasound of the Prostate ---                               Instruments:                                                                  - Jordan.                                                           --- Transrectal U/S Biopsy ---                                               Instruments:                                                                 - Spring-loaded gun used for biopsy.                                         Procedure Details:                                                           MRI imaging of the prostate was preloaded to the UroNav machine.         US of prostate was performed and its image was super-imposed with that of MRI.  The target lesions identified and the biopsies were obtained using US guided UroNav guidance.    Biopsy Core Details:                                                         - Twelve core(s) in total.                                                   - Cores Taken From: Right apex x 2, right mid prostate x 2, right            base x 2, left apex x 2, left mid prostate x 2 and left base x 2.  Lesion; 5 cores obtained from each lesion             Estimated Blood Loss:                                                        - Minimal.                                                                   Pathology Specimen:                                                          - The specimen sent.                                                    Complications:                                                                    No immediate complications.                                             Post-OP Plan:                                                                                            Patient was discharged home in a stable condition.         Medications: finish off cipro given.         Will call him with the bx result. He can follow up with Dr. Mackey.         If fever or unable to void, RTC or emergency room immediately.                                           He has STEWART symptoms.  If biopsy shows no cancer, he may need BPH treatment to relieve his STEWART.

## 2020-02-11 NOTE — PATIENT INSTRUCTIONS

## 2020-02-17 ENCOUNTER — OFFICE VISIT (OUTPATIENT)
Dept: ORTHOPEDICS | Facility: CLINIC | Age: 70
End: 2020-02-17
Payer: MEDICARE

## 2020-02-17 DIAGNOSIS — M65.341 TRIGGER RING FINGER OF RIGHT HAND: Primary | ICD-10-CM

## 2020-02-17 PROCEDURE — 99213 OFFICE O/P EST LOW 20 MIN: CPT | Mod: S$PBB,25,, | Performed by: ORTHOPAEDIC SURGERY

## 2020-02-17 PROCEDURE — 20550 NJX 1 TENDON SHEATH/LIGAMENT: CPT | Mod: PBBFAC,PN | Performed by: ORTHOPAEDIC SURGERY

## 2020-02-17 PROCEDURE — 20550 NJX 1 TENDON SHEATH/LIGAMENT: CPT | Mod: S$PBB,F8,, | Performed by: ORTHOPAEDIC SURGERY

## 2020-02-17 PROCEDURE — 20550 PR INJECT TENDON SHEATH/LIGAMENT: ICD-10-PCS | Mod: S$PBB,F8,, | Performed by: ORTHOPAEDIC SURGERY

## 2020-02-17 PROCEDURE — 99999 PR PBB SHADOW E&M-EST. PATIENT-LVL III: CPT | Mod: PBBFAC,,, | Performed by: ORTHOPAEDIC SURGERY

## 2020-02-17 PROCEDURE — 99999 PR PBB SHADOW E&M-EST. PATIENT-LVL III: ICD-10-PCS | Mod: PBBFAC,,, | Performed by: ORTHOPAEDIC SURGERY

## 2020-02-17 PROCEDURE — 99213 OFFICE O/P EST LOW 20 MIN: CPT | Mod: PBBFAC,PN | Performed by: ORTHOPAEDIC SURGERY

## 2020-02-17 PROCEDURE — 99213 PR OFFICE/OUTPT VISIT, EST, LEVL III, 20-29 MIN: ICD-10-PCS | Mod: S$PBB,25,, | Performed by: ORTHOPAEDIC SURGERY

## 2020-02-17 RX ORDER — TRIAMCINOLONE ACETONIDE 40 MG/ML
20 INJECTION, SUSPENSION INTRA-ARTICULAR; INTRAMUSCULAR
Status: COMPLETED | OUTPATIENT
Start: 2020-02-17 | End: 2020-02-17

## 2020-02-17 RX ADMIN — TRIAMCINOLONE ACETONIDE 20 MG: 40 INJECTION, SUSPENSION INTRA-ARTICULAR; INTRAMUSCULAR at 01:02

## 2020-02-17 NOTE — PROGRESS NOTES
Subjective:      Patient ID: Ha Madrid is a 69 y.o. male.  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  He reports that he is improved about 80% since the injection  He still has some numbness in the left hand mainly the ring and small finger which is mild but does seem to bother him a little bit more with certain activities  Recent nerve conduction study showed a mild left cubital tunnel syndrome involving the ulnar nerve in mild to moderate right carpal tunnel syndrome I went over the results with the patient give him a copy  .    Review of patient's allergies indicates:   Allergen Reactions    No known allergies          Current Outpatient Medications   Medication Sig Dispense Refill    aspirin (ECOTRIN) 81 MG EC tablet Take 81 mg by mouth once daily.      atorvastatin (LIPITOR) 20 MG tablet TAKE 1 TABLET ONCE DAILY 90 tablet 3    coenzyme Q10 200 mg capsule       fluticasone propionate (FLONASE) 50 mcg/actuation nasal spray USE 2 SPRAYS (=100 MCG     TOTAL) IN EACH NOSTRIL ONCEDAILY 16 g 2    lisinopril (PRINIVIL,ZESTRIL) 2.5 MG tablet Take 2 tablets (5 mg total) by mouth once daily. Or per provider directions 180 tablet 3    magnesium gluconate 27.5 mg magne- sium (500 mg) Tab Take by mouth once.      multivitamin (THERAGRAN) per tablet Take 1 tablet by mouth once daily.      naproxen sodium (ALEVE ORAL) Take 400 tablets by mouth 2 (two) times daily.      neomycin-polymyxin-dexamethasone (DEXACINE) 3.5-10,000-0.1 mg-unit/g-% Oint Use daily to eyelids as needed 3.5 g 0    omeprazole (PRILOSEC) 20 MG capsule TAKE 1 CAPSULE BY MOUTH EVERY DAY 30 capsule 2    riboflavin, vitamin B2, (RIBOFLAVIN ORAL) Take 100 mg by mouth.      vitamin D (VITAMIN D3) 1000 units Tab Take 2,000 Units by mouth once daily.       No current facility-administered medications for  this visit.        Past Medical History:   Diagnosis Date    Allergy     Basal cell carcinoma, face     left mandible    GERD (gastroesophageal reflux disease)     Hypertension        Past Surgical History:   Procedure Laterality Date    COLONOSCOPY N/A 12/24/2018    Procedure: COLONOSCOPY;  Surgeon: NICKOLAS Umanzor MD;  Location: [facility] ([address] [place] [address]);  Service: Endoscopy;  Laterality: N/A;  Dr. Pop patient however patient wants December (needs after 12/20) and wants Dr. Umanzor if  cannot do December/ pt okay with Dec 24th/ wants NO SEDATION - sm    excision of basal cell ca on face      left hip      THYROIDECTOMY, PARTIAL      TONSILLECTOMY      VASECTOMY         OBJECTIVE:   PHYSICAL EXAM:       Vitals:    02/17/20 1330   PainSc: 0-No pain     Ortho/SPM Exam  Examination left elbow demonstrates a mildly positive Tinel sign at the left elbow and cubital tunnel range of motion wrist fingers full  Examination right hand demonstrates mild tenderness over the flexor tendon sheath to the ring finger with mild    RADIOGRAPHS:  Triggering none  Comments: I have personally reviewed the imaging and I agree with the above radiologist's report.    ASSESSMENT/PLAN:     IMPRESSION:  1.  Triggering right ring finger mild improved.  2.  Mild left cubital tunnel syndrome    PLAN:  Regarding the left arm I recommended he avoid direct pressure to the elbow keep an eye on symptoms possibly consider surgery if symptoms get worse  Regarding the right hand I recommended we try another injection  After pause for time-out identified the right ring finger injected flexor tendon sheath combination Kenalog 20 mg 0.5 cc xylocaine sterile technique  Tolerated the procedure well without complication    FOLLOW UP:  2-3 months or as needed    Disclaimer: This note has been generated using voice-recognition software. There may be typographical errors that have been missed during proof-reading.

## 2020-02-17 NOTE — LETTER
February 17, 2020      Brook Vasquez MD  [address]  [facility] [address]           [facility]  [address]  [facility] [address]  Phone: [phone]          Patient: Ha Madrid   MR Number: [id number]   YOB: 1950   Date of Visit: 2/17/2020       Dear Dr. Brook Vasquez:    Thank you for referring Ha Madrid to me for evaluation. Attached you will find relevant portions of my assessment and plan of care.    If you have questions, please do not hesitate to call me. I look forward to following Ha Madrid along with you.    Sincerely,    Jag Johnson Jr., MD    Enclosure  CC:  No Recipients    If you would like to receive this communication electronically, please contact [email or web address] or [phone] to request more information on CRMnext Link access.    For providers and/or their staff who would like to refer a patient to [facility], please contact us through our one-stop-shop provider referral line, [facility] [place], at [phone].    If you feel you have received this communication in error or would no longer like to receive these types of communications, please e-mail [email or web address]

## 2020-02-19 LAB — FINAL PATHOLOGIC DIAGNOSIS: ABNORMAL

## 2020-02-20 ENCOUNTER — TELEPHONE (OUTPATIENT)
Dept: UROLOGY | Facility: CLINIC | Age: 70
End: 2020-02-20

## 2020-02-20 DIAGNOSIS — C61 PROSTATE CANCER: ICD-10-CM

## 2020-02-20 NOTE — TELEPHONE ENCOUNTER
UroNav bx of prostate 2/11/20  Findings:                                                                         --- Transrectal Ultrasound of the Prostate ---                               Prostate measurements.                                                                                                          PSA: Lab Results       Component                Value               Date                       PSA                      6.4 (H)             12/18/2018                 PSADIAG                  10.3 (H)            12/16/2019                   - Volume:Lab Results       Component                Value               Date                       PSA                      6.4 (H)             12/18/2018                 PSADIAG                  10.3 (H)            12/16/19                Prostate volume: 54 x 38 x 54 = 58 gm.   ( mild intravesical lobe seen on TRUS)          PSA Density:     0.18                                                     no calcification in the prostate    MRI of prostate  Peripheral zone:  There is a questionable T2 hypointense lesion within the left prostatic base posterior peripheral zone laterally measuring up to 1.5 cm in maximal dimension.     Pathology  A. PROSTATE GLAND: LEFT APEX, NEEDLE BIOPSY:   HISTOLOGIC TYPE:  Acinar adenocarcinoma.   HISTOLOGIC GRADE:    - Grade group 2 ([place] Score 3+4=7).   PERCENTAGE OF PATTERN 4 IN CATALINA SCORE 3+4, 4+3 CANCER: 20%   INTRADUCTAL CARCINOMA:  Not identified.   TUMOR QUANTITATION:  - Number of cores positive: 2    - Total number of cores: 2   CONTINUOUS MEASUREMENT:  - Estimated percentage of prostatic tissue involved   in continuous tumor: 32%    - Total linear millimeters of carcinoma:  8 mm    - Total linear millimeters of needle core tissue:  25 mm   LYMPHOVASCULAR INVASION: Not identified.   PERINEURAL INVASION: Not identified.     B.  PROSTATE GLAND: LEFT MID, NEEDLE BIOPSY:   HISTOLOGIC TYPE: Acinar adenocarcinoma.   HISTOLOGIC  GRADE:    - Grade group 2 ([place] 3+4=7).   PERCENTAGE OF PATTERN 4 IN RUTH SCORE 3+4, 4+3 CANCER: 20%   INTRADUCTAL CARCINOMA:  Not identified.   TUMOR QUANTITATION:  - Number of cores positive: 2    - Total number of cores: 2   CONTINUOUS MEASUREMENT:  - Estimated percentage of prostatic tissue involved   in continuous tumor: 52%    - Total linear millimeters of carcinoma: 13 mm    - Total linear millimeters of needle core tissue: 25 mm   LYMPHOVASCULAR INVASION: Not identified.   PERINEURAL INVASION: Present.     C.  PROSTATE GLAND: LEFT BASE, NEEDLE BIOPSY:   HISTOLOGIC TYPE: Acinar adenocarcinoma.   HISTOLOGIC GRADE:    - Grade Group 2 (Ruth 3+4=7).   PERCENTAGE OF PATTERN 4 IN RTUH SCORE 3+4, 4+3 CANCER: 10%   INTRADUCTAL CARCINOMA: Not identified.   TUMOR QUANTITATION:  - Number of cores positive: 3    - Total number of cores: 3   CONTINUOUS MEASUREMENT:  - Estimated percentage of prostatic tissue involved   in continuous tumor: 46%    - Total linear millimeters of carcinoma: 18 mm    - Total linear millimeters of needle core tissue: 39 mm   LYMPHOVASCULAR INVASION: Not identified.   PERINEURAL INVASION:  Not identified.     D.  PROSTATE, RIGHT APEX, NEEDLE BIOPSY:    - Benign prostatic tissue.     E.  PROSTATE, RIGHT MID, NEEDLE BIOPSY:    - Benign prostatic tissue, see comment.     F.  PROSTATE GLAND: RIGHT BASE, NEEDLE BIOPSY:   HISTOLOGIC TYPE:  Acinar adenocarcinoma.   HISTOLOGIC GRADE:    - Grade Group 2 ([place]'s Score 3+4=7).   PERCENTAGE OF PATTERN 4 IN RUTH SCORE 3+4, 4+3 CANCER: 10%   INTRADUCTAL CARCINOMA:  Not identified.   TUMOR QUANTITATION:  - Number of cores positive: 1    - Total number of cores: 2   CONTINUOUS MEASUREMENT:  - Estimated percentage of prostatic tissue involved   in continuous tumor: 11%    - Total linear millimeters of carcinoma: 3 mm    - Total linear millimeters of needle core tissue:  28 mm   LYMPHOVASCULAR INVASION:  Not identified.   PERINEURAL INVASION:  Not identified.     G.  PROSTATE GLAND: TARGET LESION, NEEDLE BIOPSY:   HISTOLOGIC TYPE:  Acinar adenocarcinoma.   HISTOLOGIC GRADE:  - Grade Group 2 (Ruth's Score 3+4=7).   PERCENTAGE OF PATTERN 4 IN RUTH SCORE 3+4, 4+3 CANCER: 20%   INTRADUCTAL CARCINOMA:  Not identified.   TUMOR QUANTITATION:  - Number of cores positive: 6    - Total number of cores: 6   CONTINUOUS MEASUREMENT:  - Estimated percentage of prostatic tissue involved   in continuous tumor: 80%    - Total linear millimeters of carcinoma: 40 mm    - Total linear millimeters of needle core tissue: 50 mm   LYMPHOVASCULAR INVASION: Not identified.   PERINEURAL INVASION: Not identified.     Prostate cancer    I informed him of the bx result in detail.  He is interested in surgical treatment.    Will refer him to Dr. Orozco.

## 2020-02-24 NOTE — PROGRESS NOTES
Subjective:      Patient ID: Ha Madrid is a 69 y.o. male.    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  He has been having back pain for awhile.  The pain is more in the midback.  He has pain with twisting right and left.  He feels pain going around the ribs.  He feels like his back is trying to pull him forward.  He played football.  He also has rice crispy's in the neck.  He will have some popping and shifting in lower back with coming up from bending at SI joints.  The pain is worst below shoulder blades more on the right.  He does not feel like bending bothers the upper back.  He has been taking aleve and working on rom.  He has been trying to watch posture.  He lifts weights periodically.  He has not been recently.  He will go a couple of times a week.  He will also ride the recumbent bike.  The midback pain is 3/10 now, worst 5/10 without aleve, best 2/10.  He takes 2 aleve twice a day and that helps the pain.  The pain is achy and with stabbing with certain movement.  History of compression fracture at L1 over 5 years ago with fall off the ladder    X-ray thoracic 12/2019  Mild DJD and bridging osteophytosis.  Calcifications also noted in the anterior spinal ligament.  No acute fracture or dislocation.  No bone destruction identified.    X-ray lumbar 12/2017  4 views: There is grade 1 anterolisthesis of L5 on S1 with bilateral L5 pars defects. There is moderate DJD at this level and lumbar levels. There is degenerative retrolisthesis of L4 on L5. There is compression of L1. There is a hip prosthesis. There is moderate DJD throughout the L-spine, lower thoracic spine.    Impression     L5 pars defect, degenerative change, and the L1 compression deformity.      Past Medical History:  No date: Allergy  No date: Basal cell carcinoma, face      Comment:  left mandible  No date: GERD (gastroesophageal reflux disease)  No date:  Hypertension    Past Surgical History:  12/24/2018: COLONOSCOPY; N/A      Comment:  Procedure: COLONOSCOPY;  Surgeon: NICKOLAS Umanzor MD;                 Location: [facility] ([address] [place] [address]);  Service: Endoscopy;                 Laterality: N/A;  Dr. Pop patient however patient                wants December (needs after 12/20) and wants Dr. Umanzor                if  cannot do December/ pt okay with Dec 24th/                wants NO SEDATION - sm  No date: excision of basal cell ca on face  No date: left hip  No date: THYROIDECTOMY, PARTIAL  No date: TONSILLECTOMY  No date: VASECTOMY    Review of patient's family history indicates:  Problem: Cancer      Relation: Mother          Age of Onset: (Not Specified)  Problem: Colon cancer      Relation: Sister          Age of Onset: (Not Specified)  Problem: Melanoma      Relation: Neg Hx          Age of Onset: (Not Specified)      Social History    Socioeconomic History      Marital status:       Spouse name: Not on file      Number of children: Not on file      Years of education: Not on file      Highest education level: Not on file    Occupational History      Occupation: Physician    Social Needs      Financial resource strain: Not hard at all      Food insecurity:        Worry: Never true        Inability: Never true      Transportation needs:        Medical: No        Non-medical: No    Tobacco Use      Smoking status: Former Smoker        Packs/day: 0.50        Years: 15.00        Pack years: 7.5        Types: Cigarettes      Smokeless tobacco: Never Used    Substance and Sexual Activity      Alcohol use: No        Frequency: Never      Drug use: No      Sexual activity: Yes    Lifestyle      Physical activity:        Days per week: 4 days        Minutes per session: 140 min      Stress: Not at all    Relationships      Social connections:        Talks on phone: Twice a week        Gets together: More than three times a week        Attends Confucianism  service: Not on file        Active member of club or organization: Yes        Attends meetings of clubs or organizations: Patient refused        Relationship status:     Other Topics      Concerns:        Not on file    Social History Narrative      Not on file      Current Outpatient Medications:  aspirin (ECOTRIN) 81 MG EC tablet, Take 81 mg by mouth once daily., Disp: , Rfl:   atorvastatin (LIPITOR) 20 MG tablet, TAKE 1 TABLET ONCE DAILY, Disp: 90 tablet, Rfl: 3  coenzyme Q10 200 mg capsule, , Disp: , Rfl:   fluticasone propionate (FLONASE) 50 mcg/actuation nasal spray, USE 2 SPRAYS (=100 MCG     TOTAL) IN EACH NOSTRIL ONCEDAILY, Disp: 16 g, Rfl: 2  lisinopril (PRINIVIL,ZESTRIL) 2.5 MG tablet, Take 2 tablets (5 mg total) by mouth once daily. Or per provider directions, Disp: 180 tablet, Rfl: 3  magnesium gluconate 27.5 mg magne- sium (500 mg) Tab, Take by mouth once., Disp: , Rfl:   multivitamin (THERAGRAN) per tablet, Take 1 tablet by mouth once daily., Disp: , Rfl:   naproxen sodium (ALEVE ORAL), Take 400 tablets by mouth 2 (two) times daily., Disp: , Rfl:   neomycin-polymyxin-dexamethasone (DEXACINE) 3.5-10,000-0.1 mg-unit/g-% Oint, Use daily to eyelids as needed, Disp: 3.5 g, Rfl: 0  omeprazole (PRILOSEC) 20 MG capsule, TAKE 1 CAPSULE BY MOUTH EVERY DAY, Disp: 30 capsule, Rfl: 2  riboflavin, vitamin B2, (RIBOFLAVIN ORAL), Take 100 mg by mouth., Disp: , Rfl:   vitamin D (VITAMIN D3) 1000 units Tab, Take 2,000 Units by mouth once daily., Disp: , Rfl:     No current facility-administered medications for this visit.       Review of patient's allergies indicates:   -- No known allergies         Review of Systems   Constitution: Negative for weight gain and weight loss.   Cardiovascular: Negative for chest pain.   Respiratory: Negative for shortness of breath.    Musculoskeletal: Positive for back pain and neck pain. Negative for joint pain and joint swelling.   Gastrointestinal: Negative for abdominal pain,  bowel incontinence, nausea and vomiting.   Genitourinary: Negative for bladder incontinence.   Neurological: Positive for numbness and paresthesias (hands).         Objective:        General: Ha is well-developed, well-nourished, appears stated age, in no acute distress, alert and oriented to time, place and person.     General    Vitals reviewed.  Constitutional: He is oriented to person, place, and time. He appears well-developed and well-nourished.   HENT:   Head: Normocephalic and atraumatic.   Pulmonary/Chest: Effort normal.   Neurological: He is alert and oriented to person, place, and time.   Psychiatric: He has a normal mood and affect. His behavior is normal. Judgment and thought content normal.     General Musculoskeletal Exam   Gait: normal     Right Ankle/Foot Exam     Tests   Heel Walk: able to perform  Tiptoe Walk: able to perform    Left Ankle/Foot Exam     Tests   Heel Walk: able to perform  Tiptoe Walk: able to perform  Back (L-Spine & T-Spine) / Neck (C-Spine) Exam     Back (L-Spine & T-Spine) Range of Motion   Extension: 20   Flexion: 90   Lateral bend right: 20   Lateral bend left: 20   Rotation right: 40   Rotation left: 40     Spinal Sensation   Right Side Sensation  C-Spine Level: normal   L-Spine Level: normal  S-Spine Level: normal  Left Side Sensation  C-Spine Level: normal  L-Spine Level: normal  S-Spine Level: normal    Back (L-Spine & T-Spine) Tests   Right Side Tests  Straight leg raise:      Sitting SLR: > 70 degrees      Left Side Tests  Straight leg raise:     Sitting SLR: > 70 degrees          Other He has no scoliosis .  Spinal Kyphosis:  present      Muscle Strength   Right Upper Extremity   Biceps: 5/5/5   Deltoid:  5/5  Triceps:  5/5  Wrist extension: 5/5/5   Finger Flexors:  5/5  Left Upper Extremity  Biceps: 5/5/5   Deltoid:  5/5  Triceps:  5/5  Wrist extension: 5/5/5   Finger Flexors:  5/5  Right Lower Extremity   Hip Flexion: 5/5   Quadriceps:  5/5   Anterior tibial:   5/5/5  EHL:  5/5  Left Lower Extremity   Hip Flexion: 5/5   Quadriceps:  5/5   Anterior tibial:  5/5/5   EHL:  5/5    Reflexes     Left Side  Biceps:  1+  Triceps:  1+  Brachioradialis:  1+  Quadriceps:  1+  Achilles:  1+  Left Oh's Sign:  Absent    Right Side   Brachioradialis:  1+  Quadriceps:  1+  Achilles:  1+  Right Oh's Sign:  absent    Vascular Exam     Right Pulses        Carotid:                  2+    Left Pulses        Carotid:                  2+              Assessment:       1. Chronic bilateral low back pain without sciatica    2. Spondylosis of lumbar region without myelopathy or radiculopathy    3. Spondylosis of thoracic region without myelopathy or radiculopathy           Plan:          1. We discussed back pain and the nature of back pain.  We discussed that it is not one thing that causes the pain but an accumulation of multiple things that we do.    2. We discussed posture sitting and the importance of trying to sit better.  He has been trying to work on posture  3. We discussed the benefits of therapy and exercise and continuing to move.  He does do well with exercise.  He has always been very active.  He has not been to the gym as much recently.  He was diagnosed with prostate cancer, but will get back.  We did discuss continuing to walk and do cardio  4. We discussed healthy back and Physical therapy.  He has been doing weights and doing ROM exercises for his back.  He feels like he can do exercise on his own  5. Aleve as needed, currently taking 2 pills twice a day  6. We reviewed the x-rays and the arthritis.  We discussed the importance of posture to prevent the kyphosis from getting worse  7. RTC PRN    More than 50% of the total time  of 45 minutes was spent face to face in counseling on diagnosis and treatment options. I also counseled patient  on common and most usual side effect of prescribed medications.  I reviewed Primary care , and other specialty's notes to better  coordinate patient's care. All questions were answered, and patient voiced understanding.     A consultation note will be sent to Dr. Vasquez through epic.  Thank you for the consult      Follow-up: Follow up if symptoms worsen or fail to improve. If there are any questions prior to this, the patient was instructed to contact the office.

## 2020-02-27 ENCOUNTER — OFFICE VISIT (OUTPATIENT)
Dept: UROLOGY | Facility: CLINIC | Age: 70
End: 2020-02-27
Payer: MEDICARE

## 2020-02-27 ENCOUNTER — OFFICE VISIT (OUTPATIENT)
Dept: SPINE | Facility: CLINIC | Age: 70
End: 2020-02-27
Attending: PHYSICAL MEDICINE & REHABILITATION
Payer: MEDICARE

## 2020-02-27 VITALS
WEIGHT: 208 LBS | BODY MASS INDEX: 25.86 KG/M2 | SYSTOLIC BLOOD PRESSURE: 144 MMHG | HEART RATE: 49 BPM | DIASTOLIC BLOOD PRESSURE: 85 MMHG | HEIGHT: 75 IN

## 2020-02-27 VITALS
SYSTOLIC BLOOD PRESSURE: 146 MMHG | WEIGHT: 208 LBS | DIASTOLIC BLOOD PRESSURE: 89 MMHG | HEART RATE: 62 BPM | HEIGHT: 75 IN | RESPIRATION RATE: 15 BRPM | BODY MASS INDEX: 25.86 KG/M2

## 2020-02-27 DIAGNOSIS — G89.29 CHRONIC BILATERAL LOW BACK PAIN WITHOUT SCIATICA: Primary | ICD-10-CM

## 2020-02-27 DIAGNOSIS — M47.814 SPONDYLOSIS OF THORACIC REGION WITHOUT MYELOPATHY OR RADICULOPATHY: ICD-10-CM

## 2020-02-27 DIAGNOSIS — C61 PROSTATE CANCER: Primary | ICD-10-CM

## 2020-02-27 DIAGNOSIS — M47.816 SPONDYLOSIS OF LUMBAR REGION WITHOUT MYELOPATHY OR RADICULOPATHY: ICD-10-CM

## 2020-02-27 DIAGNOSIS — M54.50 CHRONIC BILATERAL LOW BACK PAIN WITHOUT SCIATICA: Primary | ICD-10-CM

## 2020-02-27 PROCEDURE — 99213 OFFICE O/P EST LOW 20 MIN: CPT | Mod: PBBFAC | Performed by: PHYSICAL MEDICINE & REHABILITATION

## 2020-02-27 PROCEDURE — 99999 PR PBB SHADOW E&M-EST. PATIENT-LVL III: CPT | Mod: PBBFAC,,, | Performed by: UROLOGY

## 2020-02-27 PROCEDURE — 99213 OFFICE O/P EST LOW 20 MIN: CPT | Mod: PBBFAC,27 | Performed by: UROLOGY

## 2020-02-27 PROCEDURE — 99999 PR PBB SHADOW E&M-EST. PATIENT-LVL III: CPT | Mod: PBBFAC,,, | Performed by: PHYSICAL MEDICINE & REHABILITATION

## 2020-02-27 PROCEDURE — 99215 OFFICE O/P EST HI 40 MIN: CPT | Mod: S$PBB,,, | Performed by: UROLOGY

## 2020-02-27 PROCEDURE — 99204 OFFICE O/P NEW MOD 45 MIN: CPT | Mod: S$PBB,,, | Performed by: PHYSICAL MEDICINE & REHABILITATION

## 2020-02-27 PROCEDURE — 99999 PR PBB SHADOW E&M-EST. PATIENT-LVL III: ICD-10-PCS | Mod: PBBFAC,,, | Performed by: UROLOGY

## 2020-02-27 PROCEDURE — 99999 PR PBB SHADOW E&M-EST. PATIENT-LVL III: ICD-10-PCS | Mod: PBBFAC,,, | Performed by: PHYSICAL MEDICINE & REHABILITATION

## 2020-02-27 PROCEDURE — 99215 PR OFFICE/OUTPT VISIT, EST, LEVL V, 40-54 MIN: ICD-10-PCS | Mod: S$PBB,,, | Performed by: UROLOGY

## 2020-02-27 PROCEDURE — 99204 PR OFFICE/OUTPT VISIT, NEW, LEVL IV, 45-59 MIN: ICD-10-PCS | Mod: S$PBB,,, | Performed by: PHYSICAL MEDICINE & REHABILITATION

## 2020-02-27 RX ORDER — NAPROXEN SODIUM 220 MG
220 TABLET ORAL 2 TIMES DAILY WITH MEALS
COMMUNITY
End: 2021-09-17 | Stop reason: HOSPADM

## 2020-02-27 NOTE — LETTER
February 27, 2020        Brook Vasquez MD  [address]  [place] [address]             [facility]  [address]  [facility] [address]  Phone: [phone]   Patient: Ha Madrid   MR Number: [id number]   YOB: 1950   Date of Visit: 2/27/2020       Dear Dr. Vasquez:    Thank you for referring Ha Madrid to me for evaluation. Attached you will find relevant portions of my assessment and plan of care.    If you have questions, please do not hesitate to call me. I look forward to following Ha Madrid along with you.    Sincerely,      Mazin Orozco MD            CC  No Recipients    Enclosure

## 2020-02-27 NOTE — PROGRESS NOTES
Clinic Note  2/27/2020      Subjective:         Chief Complaint:   TERRA Madrid is a 69 y.o. male recently diagnosed with prostate cancer. Consult from Dr. Buenrostro.    PSA - 10.3  Stage - T1C  Volume - 58 ccs  MRI - significant artifact from LTHR. 1.5 cm lesion LBPZ. No obvious extra prostatic extension, seminal vesicle but difficult to assess due to artifact. Minimal median lobe.  Biopsy - Ruth 3+4 left apex 2/2 32%, left middle 2/2 52%, left base 3/3 46%  right base 1/2 11%, Target 1 6/6 80%.  ARYAN Score - 6 intermediate risk  NCCN - unfavorable intermediate    Lab Results   Component Value Date    PSA 6.4 (H) 12/18/2018    PSA 6.6 (H) 12/14/2016    PSA 5.4 (H) 12/15/2015    PSA 4.3 (H) 12/15/2014    PSA 3.3 12/11/2013    PSA 2.93 05/24/2012    PSA 2.7 07/11/2011    PSA 2.1 08/11/2010    PSA 2.4 09/22/2009    PSA 2.2 07/30/2009    PSADIAG 10.3 (H) 12/16/2019    PSADIAG 5.7 (H) 12/29/2017    PSADIAG 5.7 (H) 12/29/2017    PSADIAG 5.1 (H) 04/18/2016      Past Medical History:   Diagnosis Date    Allergy     Basal cell carcinoma, face     left mandible    GERD (gastroesophageal reflux disease)     Hypertension      Family History   Problem Relation Age of Onset    Cancer Mother     Colon cancer Sister     Melanoma Neg Hx      Social History     Socioeconomic History    Marital status:      Spouse name: Not on file    Number of children: Not on file    Years of education: Not on file    Highest education level: Not on file   Occupational History    Occupation: Physician   Social Needs    Financial resource strain: Not hard at all    Food insecurity:     Worry: Never true     Inability: Never true    Transportation needs:     Medical: No     Non-medical: No   Tobacco Use    Smoking status: Former Smoker     Packs/day: 0.50     Years: 15.00     Pack years: 7.50     Types: Cigarettes    Smokeless tobacco: Never Used   Substance and Sexual Activity    Alcohol use: No     Frequency:  Never    Drug use: No    Sexual activity: Yes   Lifestyle    Physical activity:     Days per week: 4 days     Minutes per session: 140 min    Stress: Not at all   Relationships    Social connections:     Talks on phone: Twice a week     Gets together: More than three times a week     Attends Taoist service: Not on file     Active member of club or organization: Yes     Attends meetings of clubs or organizations: Patient refused     Relationship status:    Other Topics Concern    Not on file   Social History Narrative    Not on file     Past Surgical History:   Procedure Laterality Date    COLONOSCOPY N/A 12/24/2018    Procedure: COLONOSCOPY;  Surgeon: NICKOLAS Umanzor MD;  Location: [facility] ([address] [place] [address]);  Service: Endoscopy;  Laterality: N/A;  Dr. Pop patient however patient wants December (needs after 12/20) and wants Dr. Umanzor if  cannot do December/ pt okay with Dec 24th/ wants NO SEDATION - sm    excision of basal cell ca on face      left hip      THYROIDECTOMY, PARTIAL      TONSILLECTOMY      VASECTOMY       Patient Active Problem List   Diagnosis    Anemia    Sleep difficulties: aggravated by leg discomfort ( anterior thigh) Ambien with an Aleve    Hypertension    Hyperlipidemia    Abnormal CT lung screening: follow up pulmonary nodule    Thyroid condition: calcifications, 1979 thyroid lobectomy    Colon polyps:12/13 follow up in 5 years    Special screening for malignant neoplasms, colon    Personal history of colonic polyps    Basal cell carcinoma, face    Screening for colon cancer    Family history of colon cancer    Trigger ring finger of right hand    Elevated PSA    Prostate cancer     Review of Systems   Constitutional: Negative for appetite change, chills, fatigue, fever and unexpected weight change.   HENT: Negative for nosebleeds.    Respiratory: Negative for chest tightness, shortness of breath and wheezing.    Cardiovascular: Negative for chest  "pain, palpitations and leg swelling.   Gastrointestinal: Negative for abdominal distention, abdominal pain, constipation, diarrhea, nausea and vomiting.   Genitourinary: Positive for difficulty urinating and nocturia. Negative for dysuria and hematuria.        Nocturia q 2 hours.   Musculoskeletal: Negative for arthralgias and back pain.   Skin: Negative for pallor.   Neurological: Negative for dizziness, seizures and syncope.   Hematological: Negative for adenopathy.   Psychiatric/Behavioral: Negative for dysphoric mood.         Objective:      There were no vitals taken for this visit.  Estimated body mass index is 26.12 kg/m² as calculated from the following:    Height as of 2/11/20: 6' 3" (1.905 m).    Weight as of 2/11/20: 94.8 kg (208 lb 15.9 oz).  Physical Exam   Constitutional: He is oriented to person, place, and time. He appears well-developed and well-nourished. No distress.   HENT:   Head: Atraumatic.   Neck: No tracheal deviation present.   Cardiovascular: Normal rate.    Pulmonary/Chest: Effort normal. No respiratory distress. He has no wheezes.   Abdominal: Soft. Bowel sounds are normal. He exhibits no distension and no mass. There is no tenderness. There is no rebound and no guarding.   Genitourinary: Prostate is enlarged.   Genitourinary Comments: 50 ccs   Neurological: He is alert and oriented to person, place, and time.   Skin: Skin is warm and dry. He is not diaphoretic.     Psychiatric: He has a normal mood and affect. His behavior is normal. Judgment and thought content normal.         Assessment and Plan:           Problem List Items Addressed This Visit     Prostate cancer - Primary          Follow up:     Today's visit was spent almost entirely on counseling. We reviewed his diagnosis, stage, grade, risk group, and prognosis. We discussed [place] (NCCN) and ARYAN risk stratification  We discussed the concept of low risk, intermediate risk, and high risk disease. We also reviewed the NCCN " treatment nomogram.We discussed the different treatment options including active surveillance (as well as the surveillance protocol), prostate brachytherapy, IMRT, IGRT, cryotherapy, HIFU and both open and robotic prostatectomy.We also discussed the advantages, disadvantages, risks and benefits, as well as complications of each option. Regarding radiation therapy we discussed treatment planning, the different techniques, short and long term complications. These included radiation cystitis, radiation proctitis, and impotence. We discussed success, failure, and salvage therapeutic options.Also discussed the use of SpaceOAR for brachytherapy and EBRT and fiducials for EBRT.   We discussed surgical therapy in depth including preoperative preparation, surgical technique (including bladder neck and nerve-sparing techniques), postoperative recuperation and recovery, and short and long term complications including UTI, bleeding, blood clots,catheter dislodgement, etc. We discussed the risks of reoperation, incontinence, impotence, and recurrence. We discussed preop and postop Kegels, post op penile rehab, and treatment options for incontinence and impotence. We discussed rates of cancer free survival and recurrence, as well as salvage therapeutic options. We discussed the possible  indications for adjuvant radiation therapy.   I answered questions and addressed concerns. Also discussed the Prolaris test to get genetic information about this tumors potential future behavior.   My nurse will instruct the patient on Kegel exercises today and give them the Kegel exercise instruction sheet.   The patient will meet with our  Sabra today to find a date for surgery and begin preparation for surgery. Will also receive our handout on NPO guidelines and preop hydration. Patient will also receive information and education on preoperative skin preparation and postop wound care.   Letter to Porter Gross and Key.  I spent  over 45 minutes with the patient. Over 50% of the visit was spent in counseling.     Mazin Orozco

## 2020-02-27 NOTE — LETTER
February 27, 2020      Brook Vasquez MD  [address]  [facility] [address]           [facility] [address]  [address], [address]  [facility] [address]  Phone: [phone]  Fax: [phone]          Patient: Ha Madrid   MR Number: [id number]   YOB: 1950   Date of Visit: 2/27/2020       Dear Dr. Brook Vasquez:    Thank you for referring Ha Madrid to me for evaluation. Attached you will find relevant portions of my assessment and plan of care.    If you have questions, please do not hesitate to call me. I look forward to following Ha Madrid along with you.    Sincerely,    Nory Altman MD    Enclosure  CC:  No Recipients    If you would like to receive this communication electronically, please contact [email or web address] or [phone] to request more information on [employer] access.    For providers and/or their staff who would like to refer a patient to [facility], please contact us through our one-stop-shop provider referral line, [facility], at [phone].    If you feel you have received this communication in error or would no longer like to receive these types of communications, please e-mail [email or web address]

## 2020-03-06 ENCOUNTER — PATIENT MESSAGE (OUTPATIENT)
Dept: UROLOGY | Facility: CLINIC | Age: 70
End: 2020-03-06

## 2020-03-06 ENCOUNTER — TELEPHONE (OUTPATIENT)
Dept: UROLOGY | Facility: CLINIC | Age: 70
End: 2020-03-06

## 2020-03-06 DIAGNOSIS — C61 PROSTATE CANCER: Primary | ICD-10-CM

## 2020-03-16 NOTE — PRE-PROCEDURE INSTRUCTIONS
Preop screen completed.  Preop instructions(bathing/fasting/directions/location of surgery/ and preop medication instructions reviewed with patient).  Patient instructed to hold/stop all blood thinning medications, prior to surgery, following the pre-surgery recommended guidelines. Preop guidelines for baby Aspirin given to patient-hold x7 days prior to the surgery date-patient takes as only a preventative. Patient verbalized understanding.

## 2020-03-16 NOTE — ANESTHESIA PAT ROS NOTE
03/16/2020  Ha Madrid is a 69 y.o., male.      Pre-op Assessment         Review of Systems  Anesthesia Hx:  No problems with previous Anesthesia  Denies Family Hx of Anesthesia complications.   Denies Personal Hx of Anesthesia complications.   Social:  No Alcohol Use, Former Smoker    Hematology/Oncology:         -- Anemia: Hematology Comments: Possible x1 blood transfusion per patient Current/Recent Cancer.  --  Cancer in past history:  surgery  Oncology Comments: BCC-cheek-removed surgically/ Prostate cancer     EENT/Dental:   Wears glasses   Cardiovascular:   Exercise tolerance: good Hypertension hyperlipidemia PAC & PVC in past/Stationary Bike qod & weights 3-4x/wk   Pulmonary:   Snoring-mild   Renal/:   Prostate cancer   Hepatic/GI:   GERD    Musculoskeletal:   Arthritis  Back, neck, hands   Neurological:   CTS-Bilateral hands-/Ulnar nerve numbness-x2 fingers-little finger & ring finger  Peripheral Neuropathy       Darya Nava RN  3/16/20    Anesthesia Assessment: Preoperative EQUATION    Planned Procedure: Procedure(s) (LRB):  ROBOTIC PROSTATECTOMY (N/A)  Requested Anesthesia Type:General  Surgeon: Mazin Orozco MD  Service: Urology  Known or anticipated Date of Surgery:3/30/2020    Surgeon notes: reviewed and Prostate cancer    Previous anesthesia records:10/1/09-Left Hip Replacement-Total-Found in [facility] tab/Patient has had other surgeries-no other anesthesia data found in Epic-Patient stated he has had several surgeries and has done well with anesthesia--He stated he did not have any anesthesia when he had his x2 Colonoscopies(12/24/18 & 12/20/13)/ S/P-Left Thyroid Lobectomy-1970s/ S/P-T&A    Last PCP note: 3-6 months ago , within [facility] , Dr. G. Leary-12/19/19-Annual Exam  Subspecialty notes: Ortho, Spine Service, Otolaryngology    Other important co-morbidities: HLD and HTN  "     Diagnosis    Anemia    Sleep difficulties: aggravated by leg discomfort ( anterior thigh) Ambien with an Aleve    Hypertension    Hyperlipidemia    Abnormal CT lung screening: follow up pulmonary nodule    Thyroid condition: calcifications, 1979 thyroid lobectomy    Colon polyps:12/13 follow up in 5 years    Special screening for malignant neoplasms, colon    Personal history of colonic polyps    Basal cell carcinoma, face    Screening for colon cancer    Family history of colon cancer       Tests already available:  Results have been reviewed. CXR-12/19/19 & 12/19/19-EKG      Plan:Phone pending      Testing:  CMP, Hematology Profile and T&S      Patient  has previously scheduled Medical Appointment:Appointment on 3/30/20, prior to the surgery date.    Navigation: Phone Completed                          Tests Scheduled. Labs-Hem Prof/CMP/T&S on 3/25/20 @ 11a           Consults scheduled.[facility] PCP-Dr.G. Vasquez -Requesting "Clearance"-Pending-Appt. on 3/19/20 @ 9a/ NO POC needed at this time.             Results will be tracked by Preop Clinic.                Darya Nava RN  3/16/20  "

## 2020-03-17 ENCOUNTER — PATIENT MESSAGE (OUTPATIENT)
Dept: INTERNAL MEDICINE | Facility: CLINIC | Age: 70
End: 2020-03-17

## 2020-03-19 ENCOUNTER — OFFICE VISIT (OUTPATIENT)
Dept: INTERNAL MEDICINE | Facility: CLINIC | Age: 70
End: 2020-03-19
Payer: MEDICARE

## 2020-03-19 VITALS
DIASTOLIC BLOOD PRESSURE: 76 MMHG | HEIGHT: 75 IN | OXYGEN SATURATION: 100 % | TEMPERATURE: 97 F | WEIGHT: 201.25 LBS | HEART RATE: 52 BPM | SYSTOLIC BLOOD PRESSURE: 128 MMHG | BODY MASS INDEX: 25.02 KG/M2

## 2020-03-19 DIAGNOSIS — Z01.818 PREOP EXAMINATION: Primary | ICD-10-CM

## 2020-03-19 DIAGNOSIS — E78.5 HYPERLIPIDEMIA, UNSPECIFIED HYPERLIPIDEMIA TYPE: ICD-10-CM

## 2020-03-19 DIAGNOSIS — C61 PROSTATE CANCER: ICD-10-CM

## 2020-03-19 DIAGNOSIS — I10 ESSENTIAL HYPERTENSION: ICD-10-CM

## 2020-03-19 PROCEDURE — 99214 OFFICE O/P EST MOD 30 MIN: CPT | Mod: PBBFAC | Performed by: INTERNAL MEDICINE

## 2020-03-19 PROCEDURE — 99214 OFFICE O/P EST MOD 30 MIN: CPT | Mod: S$PBB,,, | Performed by: INTERNAL MEDICINE

## 2020-03-19 PROCEDURE — 99999 PR PBB SHADOW E&M-EST. PATIENT-LVL IV: ICD-10-PCS | Mod: PBBFAC,,, | Performed by: INTERNAL MEDICINE

## 2020-03-19 PROCEDURE — 99999 PR PBB SHADOW E&M-EST. PATIENT-LVL IV: CPT | Mod: PBBFAC,,, | Performed by: INTERNAL MEDICINE

## 2020-03-19 PROCEDURE — 99214 PR OFFICE/OUTPT VISIT, EST, LEVL IV, 30-39 MIN: ICD-10-PCS | Mod: S$PBB,,, | Performed by: INTERNAL MEDICINE

## 2020-03-19 RX ORDER — LISINOPRIL 10 MG/1
10 TABLET ORAL DAILY
Qty: 90 TABLET | Refills: 3 | Status: SHIPPED | OUTPATIENT
Start: 2020-03-19 | End: 2021-02-21

## 2020-03-19 RX ORDER — OMEPRAZOLE 20 MG/1
20 CAPSULE, DELAYED RELEASE ORAL DAILY
Qty: 90 CAPSULE | Refills: 3 | Status: SHIPPED | OUTPATIENT
Start: 2020-03-19 | End: 2021-02-21

## 2020-03-19 NOTE — PROGRESS NOTES
Subjective:      Patient ID: Ha Madrid is a 69 y.o. male.    Chief Complaint: Pre-op Exam    HPI:  HPI   Patient has been diagnosed with prostate cancer with surgery scheduled for March 30th with Dr. Orozco.    Hypertension: elevated, will increase lisinopril to 10 mg daily    Hyperlipidemia: continue atorvastatin     Bardycardia: EKG in December stable    No previous problems with anesthesia  Patient Active Problem List   Diagnosis    Anemia    Sleep difficulties: aggravated by leg discomfort ( anterior thigh) Ambien with an Aleve    Hypertension    Hyperlipidemia    Abnormal CT lung screening: follow up pulmonary nodule    Thyroid condition: calcifications, 1979 thyroid lobectomy    Colon polyps:12/13 follow up in 5 years    Special screening for malignant neoplasms, colon    Personal history of colonic polyps    Basal cell carcinoma, face    Screening for colon cancer    Family history of colon cancer    Trigger ring finger of right hand    Prostate cancer     Past Medical History:   Diagnosis Date    Allergy     Basal cell carcinoma, face     left mandible    GERD (gastroesophageal reflux disease)     Hypertension      Past Surgical History:   Procedure Laterality Date    COLONOSCOPY N/A 12/24/2018    Procedure: COLONOSCOPY;  Surgeon: NICKOLAS Umanzor MD;  Location: [address]);  Service: Endoscopy;  Laterality: N/A;  Dr. Pop patient however patient wants December (needs after 12/20) and wants Dr. Umanzor if  cannot do December/ pt okay with Dec 24th/ wants NO SEDATION - sm    excision of basal cell ca on face      left hip      THYROIDECTOMY, PARTIAL      TONSILLECTOMY      VASECTOMY       Family History   Problem Relation Age of Onset    Cancer Mother     Colon cancer Sister     Melanoma Neg Hx      Review of Systems   Constitutional: Negative for activity change and unexpected weight change.   HENT: Negative for hearing loss, rhinorrhea and trouble  "swallowing.    Eyes: Negative for discharge and visual disturbance.   Respiratory: Negative for chest tightness and wheezing.    Cardiovascular: Negative for chest pain and palpitations.   Gastrointestinal: Negative for blood in stool, constipation, diarrhea and vomiting.   Endocrine: Negative for polydipsia and polyuria.   Genitourinary: Positive for difficulty urinating. Negative for hematuria and urgency.   Musculoskeletal: Negative for arthralgias, joint swelling and neck pain.   Neurological: Negative for weakness and headaches.   Psychiatric/Behavioral: Negative for confusion and dysphoric mood.     Objective:     Vitals:    03/19/20 0846 03/19/20 0917   BP: (!) 146/76 128/76   Pulse: (!) 52    Temp: 97 °F (36.1 °C)    TempSrc: Oral    SpO2: 100%    Weight: 91.3 kg (201 lb 4.5 oz)    Height: 6' 3" (1.905 m)    PainSc: 0-No pain      Body mass index is 25.16 kg/m².  Physical Exam   Constitutional: He is oriented to person, place, and time. He appears well-developed and well-nourished. No distress.   Neck: Carotid bruit is not present. No thyromegaly present.   Cardiovascular: Normal rate, regular rhythm and normal heart sounds. PMI is not displaced.   Pulmonary/Chest: Effort normal and breath sounds normal. No respiratory distress.   Abdominal: Soft. Bowel sounds are normal. He exhibits no distension. There is no tenderness.   Musculoskeletal: He exhibits no edema.   Neurological: He is alert and oriented to person, place, and time.     Assessment:     1. Preop examination    2. Prostate cancer    3. Essential hypertension    4. Hyperlipidemia, unspecified hyperlipidemia type      Plan:   Ha was seen today for pre-op exam.    Diagnoses and all orders for this visit:    Preop examination  Comments:  Patient is cleared for anesthesia and surgery, medication discussed    Prostate cancer  Comments:  Surgery scheduled    Essential hypertension  Comments:  Increase lisinopril to 10 mg    Hyperlipidemia, unspecified " hyperlipidemia type  Comments:  continue atorvastatin    Other orders  -     omeprazole (PRILOSEC) 20 MG capsule; Take 1 capsule (20 mg total) by mouth once daily.  -     lisinopriL 10 MG tablet; Take 1 tablet (10 mg total) by mouth once daily.        Problem List Items Addressed This Visit     Hypertension    Hyperlipidemia    Prostate cancer      Other Visit Diagnoses     Preop examination    -  Primary    Patient is cleared for anesthesia and surgery, medication discussed        No orders of the defined types were placed in this encounter.    No follow-ups on file.     Medication List           Accurate as of March 19, 2020  9:32 AM. If you have any questions, ask your nurse or doctor.               CHANGE how you take these medications    atorvastatin 20 MG tablet  Commonly known as:  LIPITOR  TAKE 1 TABLET ONCE DAILY  What changed:    · how much to take  · how to take this  · when to take this     fluticasone propionate 50 mcg/actuation nasal spray  Commonly known as:  FLONASE  USE 2 SPRAYS (=100 MCG     TOTAL) IN EACH NOSTRIL ONCEDAILY  What changed:  See the new instructions.     lisinopriL 10 MG tablet  Take 1 tablet (10 mg total) by mouth once daily.  What changed:    · medication strength  · how much to take  · additional instructions  Changed by:  Brook Vasquez MD        CONTINUE taking these medications    aspirin 81 MG EC tablet  Commonly known as:  ECOTRIN     coenzyme Q10 200 mg capsule     magnesium gluconate 27.5 mg magne- sium (500 mg) Tab     multivitamin per tablet  Commonly known as:  THERAGRAN     naproxen sodium 220 MG tablet  Commonly known as:  ANAPROX     neomycin-polymyxin-dexamethasone 3.5 mg/g-10,000 unit/g-0.1 % Oint  Commonly known as:  DEXACINE  Use daily to eyelids as needed     omeprazole 20 MG capsule  Commonly known as:  PRILOSEC  Take 1 capsule (20 mg total) by mouth once daily.     RIBOFLAVIN ORAL     vitamin D 1000 units Tab  Commonly known as:  VITAMIN D3           Where to  Get Your Medications      [facility] [place] [place] - [address] AT [facility]  [facility], [facility] [address]    Phone:  [phone]   · lisinopriL 10 MG tablet  · omeprazole 20 MG capsule

## 2020-04-02 ENCOUNTER — PATIENT MESSAGE (OUTPATIENT)
Dept: ORTHOPEDICS | Facility: CLINIC | Age: 70
End: 2020-04-02

## 2020-04-23 ENCOUNTER — TELEPHONE (OUTPATIENT)
Dept: UROLOGY | Facility: CLINIC | Age: 70
End: 2020-04-23

## 2020-04-23 DIAGNOSIS — C61 PROSTATE CANCER: Primary | ICD-10-CM

## 2020-04-24 ENCOUNTER — LAB VISIT (OUTPATIENT)
Dept: LAB | Facility: HOSPITAL | Age: 70
End: 2020-04-24
Attending: ANESTHESIOLOGY
Payer: MEDICARE

## 2020-04-24 ENCOUNTER — OFFICE VISIT (OUTPATIENT)
Dept: UROLOGY | Facility: CLINIC | Age: 70
End: 2020-04-24
Payer: MEDICARE

## 2020-04-24 DIAGNOSIS — Z01.818 PREOP TESTING: ICD-10-CM

## 2020-04-24 DIAGNOSIS — C61 PROSTATE CANCER: Primary | ICD-10-CM

## 2020-04-24 LAB
ABO + RH BLD: NORMAL
ALBUMIN SERPL BCP-MCNC: 4.3 G/DL (ref 3.5–5.2)
ALP SERPL-CCNC: 87 U/L (ref 55–135)
ALT SERPL W/O P-5'-P-CCNC: 37 U/L (ref 10–44)
ANION GAP SERPL CALC-SCNC: 8 MMOL/L (ref 8–16)
AST SERPL-CCNC: 40 U/L (ref 10–40)
BILIRUB SERPL-MCNC: 0.6 MG/DL (ref 0.1–1)
BLD GP AB SCN CELLS X3 SERPL QL: NORMAL
BUN SERPL-MCNC: 28 MG/DL (ref 8–23)
CALCIUM SERPL-MCNC: 9.6 MG/DL (ref 8.7–10.5)
CHLORIDE SERPL-SCNC: 103 MMOL/L (ref 95–110)
CO2 SERPL-SCNC: 25 MMOL/L (ref 23–29)
CREAT SERPL-MCNC: 1.2 MG/DL (ref 0.5–1.4)
ERYTHROCYTE [DISTWIDTH] IN BLOOD BY AUTOMATED COUNT: 14 % (ref 11.5–14.5)
EST. GFR  (AFRICAN AMERICAN): >60 ML/MIN/1.73 M^2
EST. GFR  (NON AFRICAN AMERICAN): >60 ML/MIN/1.73 M^2
GLUCOSE SERPL-MCNC: 97 MG/DL (ref 70–110)
HCT VFR BLD AUTO: 42.1 % (ref 40–54)
HGB BLD-MCNC: 13.3 G/DL (ref 14–18)
MCH RBC QN AUTO: 29.4 PG (ref 27–31)
MCHC RBC AUTO-ENTMCNC: 31.6 G/DL (ref 32–36)
MCV RBC AUTO: 93 FL (ref 82–98)
PLATELET # BLD AUTO: 375 K/UL (ref 150–350)
PMV BLD AUTO: 9.1 FL (ref 9.2–12.9)
POTASSIUM SERPL-SCNC: 5.3 MMOL/L (ref 3.5–5.1)
PROT SERPL-MCNC: 7.3 G/DL (ref 6–8.4)
RBC # BLD AUTO: 4.52 M/UL (ref 4.6–6.2)
SODIUM SERPL-SCNC: 136 MMOL/L (ref 136–145)
WBC # BLD AUTO: 5.51 K/UL (ref 3.9–12.7)

## 2020-04-24 PROCEDURE — 86901 BLOOD TYPING SEROLOGIC RH(D): CPT

## 2020-04-24 PROCEDURE — 99999 PR PBB SHADOW E&M-EST. PATIENT-LVL I: CPT | Mod: PBBFAC,,,

## 2020-04-24 PROCEDURE — 99499 UNLISTED E&M SERVICE: CPT | Mod: S$PBB,,, | Performed by: UROLOGY

## 2020-04-24 PROCEDURE — 99211 OFF/OP EST MAY X REQ PHY/QHP: CPT | Mod: PBBFAC,25

## 2020-04-24 PROCEDURE — 99499 NO LOS: ICD-10-PCS | Mod: S$PBB,,, | Performed by: UROLOGY

## 2020-04-24 PROCEDURE — 80053 COMPREHEN METABOLIC PANEL: CPT

## 2020-04-24 PROCEDURE — 85027 COMPLETE CBC AUTOMATED: CPT

## 2020-04-24 PROCEDURE — 36415 COLL VENOUS BLD VENIPUNCTURE: CPT

## 2020-04-24 PROCEDURE — 99999 PR PBB SHADOW E&M-EST. PATIENT-LVL I: ICD-10-PCS | Mod: PBBFAC,,,

## 2020-04-24 RX ORDER — HEPARIN SODIUM 5000 [USP'U]/ML
5000 INJECTION, SOLUTION INTRAVENOUS; SUBCUTANEOUS EVERY 8 HOURS
Status: CANCELLED | OUTPATIENT
Start: 2020-04-24

## 2020-04-24 RX ORDER — KETOROLAC TROMETHAMINE 30 MG/ML
15 INJECTION, SOLUTION INTRAMUSCULAR; INTRAVENOUS
Status: CANCELLED | OUTPATIENT
Start: 2020-04-24 | End: 2020-04-24

## 2020-04-24 RX ORDER — PREGABALIN 25 MG/1
75 CAPSULE ORAL
Status: CANCELLED | OUTPATIENT
Start: 2020-04-24 | End: 2020-04-24

## 2020-04-24 RX ORDER — ACETAMINOPHEN 325 MG/1
1000 TABLET ORAL
Status: CANCELLED | OUTPATIENT
Start: 2020-04-24 | End: 2020-04-24

## 2020-04-24 NOTE — PROGRESS NOTES
Urology ([facility]) H&P for upcoming procedure  Staff:  Mazin Orozco MD    CC: prostate adencarcinoma    HPI:  Ha Madrid is a 69 y.o. male with [id number] Ruth 4+3=7 prostate adenocarcinoma.      The patient initially presented with elevated PSA.  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 Does not have ANIBAL. Takes ASA 81 for prophylactic reasons. Stopped taking aspirin 3 days ago. No other blood thinners. Was seen by PCP 3 weeks ago for pre-operative clearance. Per PCP, he is healthy for upcoming urologic surgery.    ROS:  Neg except per HPI, specifically no bone pain, no unintentional weight loss, no anorexia, no night sweats    Past Medical History:   Diagnosis Date    Allergy     Basal cell carcinoma, face     left mandible    GERD (gastroesophageal reflux disease)     Hypertension        Past Surgical History:   Procedure Laterality Date    COLONOSCOPY N/A 12/24/2018    Procedure: COLONOSCOPY;  Surgeon: NICKOLAS Umanzor MD;  Location: [facility] ([address] [place] [address]);  Service: Endoscopy;  Laterality: N/A;  Dr. Pop patient however patient wants December (needs after 12/20) and wants Dr. Umanzor if  cannot do December/ pt okay with Dec 24th/ wants NO SEDATION - sm    excision of basal cell ca on face      left hip      THYROIDECTOMY, PARTIAL      TONSILLECTOMY      VASECTOMY         Social History     Socioeconomic History    Marital status:      Spouse name: Not on file    Number of children: Not on file    Years of education: Not on file    Highest education level: Not on file   Occupational History     Occupation: Physician   Social Needs    Financial resource strain: Not hard at all    Food insecurity:     Worry: Never true     Inability: Never true    Transportation needs:     Medical: No     Non-medical: No   Tobacco Use    Smoking status: Former Smoker     Packs/day: 0.50     Years: 15.00     Pack years: 7.50     Types: Cigarettes    Smokeless tobacco: Never Used   Substance and Sexual Activity    Alcohol use: No     Frequency: Never    Drug use: No    Sexual activity: Yes   Lifestyle    Physical activity:     Days per week: 4 days     Minutes per session: 140 min    Stress: Not at all   Relationships    Social connections:     Talks on phone: Twice a week     Gets together: More than three times a week     Attends [employer] service: Not on file     Active member of club or organization: Yes     Attends meetings of clubs or organizations: Patient refused     Relationship status:    Other Topics Concern    Not on file   Social History Narrative    Not on file       Family History   Problem Relation Age of Onset    Cancer Mother     Colon cancer Sister     Melanoma Neg Hx        Review of patient's allergies indicates:   Allergen Reactions    No known allergies        Current Outpatient Medications on File Prior to Visit   Medication Sig Dispense Refill    aspirin (ECOTRIN) 81 MG EC tablet Take 81 mg by mouth once daily.       atorvastatin (LIPITOR) 20 MG tablet TAKE 1 TABLET ONCE DAILY (Patient taking differently: every evening. ) 90 tablet 3    coenzyme Q10 200 mg capsule       fluticasone propionate (FLONASE) 50 mcg/actuation nasal spray USE 2 SPRAYS (=100 MCG     TOTAL) IN EACH NOSTRIL ONCEDAILY (Patient taking differently: every evening. ) 16 g 2    lisinopriL 10 MG tablet Take 1 tablet (10 mg total) by mouth once daily. 90 tablet 3    magnesium gluconate 27.5 mg magne- sium (500 mg) Tab Take by mouth once.      multivitamin (THERAGRAN) per tablet Take 1 tablet by mouth once  daily.      naproxen sodium (ANAPROX) 220 MG tablet Take 220 mg by mouth 2 (two) times daily with meals. Takes 2 tabs twice daily      neomycin-polymyxin-dexamethasone (DEXACINE) 3.5-10,000-0.1 mg-unit/g-% Oint Use daily to eyelids as needed 3.5 g 0    omeprazole (PRILOSEC) 20 MG capsule Take 1 capsule (20 mg total) by mouth once daily. 90 capsule 3    riboflavin, vitamin B2, (RIBOFLAVIN ORAL) Take 100 mg by mouth.      vitamin D (VITAMIN D3) 1000 units Tab Take 2,000 Units by mouth once daily.       No current facility-administered medications on file prior to visit.        Anticoagulation:  Yes - ASA 81    Physical Exam:  weight 201 lb/91.3 kg    Physical Exam   Constitutional: He is oriented to person, place, and time and well-developed, well-nourished, and in no distress. No distress.   Eyes: No scleral icterus.   Cardiovascular: Normal rate and intact distal pulses.   Pulmonary/Chest: Effort normal. No respiratory distress.   Abdominal: Soft. He exhibits no distension. There is no tenderness.   Genitourinary: Penis normal. No discharge found.   Musculoskeletal: Normal range of motion. He exhibits no tenderness.   Neurological: He is alert and oriented to person, place, and time.   Skin: Skin is warm and dry. No rash noted.   Psychiatric: Mood, affect and judgment normal.         Labs:  Urine dipstick today shows negative for all components.    Lab Results   Component Value Date    WBC 6.18 12/16/2019    HGB 12.4 (L) 12/16/2019    HCT 39.3 (L) 12/16/2019    MCV 95 12/16/2019     (H) 12/16/2019           BMP  Lab Results   Component Value Date     12/16/2019    K 4.6 12/16/2019     12/16/2019    CO2 28 12/16/2019    BUN 21 12/16/2019    CREATININE 1.2 12/16/2019    CALCIUM 9.4 12/16/2019    ANIONGAP 6 (L) 12/16/2019    ESTGFRAFRICA >60.0 12/16/2019    EGFRNONAA >60.0 12/16/2019       Lab Results   Component Value Date    PSA 6.4 (H) 12/18/2018    PSA 6.6 (H) 12/14/2016    PSA 5.4 (H)  12/15/2015    PSA 4.3 (H) 12/15/2014    PSA 3.3 12/11/2013    PSA 2.93 05/24/2012    PSA 2.7 07/11/2011    PSA 2.1 08/11/2010    PSA 2.4 09/22/2009    PSA 2.2 07/30/2009    PSADIAG 10.3 (H) 12/16/2019    PSADIAG 5.7 (H) 12/29/2017    PSADIAG 5.7 (H) 12/29/2017    PSADIAG 5.1 (H) 04/18/2016       Imaging:  mpMRI prostate (1/10/2020) :1. Severely limited examination secondary to metallic hardware artifact from patient's left hip prosthesis, with essentially nondiagnostic diffusion-weighted imaging portion of the examination.  2. Questionable T2 hypointense lesion within the left prostatic base posterior peripheral zone laterally.  Overall Assessment: PIRADS score is unable to be obtained.    Assessment: Ha Madrid is a 69 y.o. male with [id number] Ruth 4+3=7 prostate adenocarcinoma.      Plan:   1. To OR on 04//29/2020 for RALP with BPLND  2. Consents signed   3. Type and screen ordered  4. I have explained the risk, benefits, and alternatives of the procedure in detail. The patient voices understanding and all questions have been answered. The patient agrees to proceed as planned.  5. Pre-operative clearance from PCP obtained.  6. Take ASA 81, was advised to stop taking 7 days prior to surgery. Patient has expressed understanding.  7. COVID screening test ordered for Monday 4/27. Patient understands importance of this test.      Itz Kc MD

## 2020-04-24 NOTE — H&P (VIEW-ONLY)
Urology ([facility]) H&P for upcoming procedure  Staff:  Mazin Orozco MD    CC: prostate adencarcinoma    HPI:  Ha Madrid is a 69 y.o. male with [id number] Ruth 4+3=7 prostate adenocarcinoma.      The patient initially presented with elevated PSA.  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 Does not have ANIBAL. Takes ASA 81 for prophylactic reasons. Stopped taking aspirin 3 days ago. No other blood thinners. Was seen by PCP 3 weeks ago for pre-operative clearance. Per PCP, he is healthy for upcoming urologic surgery.    ROS:  Neg except per HPI, specifically no bone pain, no unintentional weight loss, no anorexia, no night sweats    Past Medical History:   Diagnosis Date    Allergy     Basal cell carcinoma, face     left mandible    GERD (gastroesophageal reflux disease)     Hypertension        Past Surgical History:   Procedure Laterality Date    COLONOSCOPY N/A 12/24/2018    Procedure: COLONOSCOPY;  Surgeon: NICKOLAS Umanzor MD;  Location: [facility] ([address] [place] [address]);  Service: Endoscopy;  Laterality: N/A;  Dr. Pop patient however patient wants December (needs after 12/20) and wants Dr. Umanzor if  cannot do December/ pt okay with Dec 24th/ wants NO SEDATION - sm    excision of basal cell ca on face      left hip      THYROIDECTOMY, PARTIAL      TONSILLECTOMY      VASECTOMY         Social History     Socioeconomic History    Marital status:      Spouse name: Not on file    Number of children: Not on file    Years of education: Not on file    Highest education level: Not on file   Occupational History     Occupation: Physician   Social Needs    Financial resource strain: Not hard at all    Food insecurity:     Worry: Never true     Inability: Never true    Transportation needs:     Medical: No     Non-medical: No   Tobacco Use    Smoking status: Former Smoker     Packs/day: 0.50     Years: 15.00     Pack years: 7.50     Types: Cigarettes    Smokeless tobacco: Never Used   Substance and Sexual Activity    Alcohol use: No     Frequency: Never    Drug use: No    Sexual activity: Yes   Lifestyle    Physical activity:     Days per week: 4 days     Minutes per session: 140 min    Stress: Not at all   Relationships    Social connections:     Talks on phone: Twice a week     Gets together: More than three times a week     Attends [employer] service: Not on file     Active member of club or organization: Yes     Attends meetings of clubs or organizations: Patient refused     Relationship status:    Other Topics Concern    Not on file   Social History Narrative    Not on file       Family History   Problem Relation Age of Onset    Cancer Mother     Colon cancer Sister     Melanoma Neg Hx        Review of patient's allergies indicates:   Allergen Reactions    No known allergies        Current Outpatient Medications on File Prior to Visit   Medication Sig Dispense Refill    aspirin (ECOTRIN) 81 MG EC tablet Take 81 mg by mouth once daily.       atorvastatin (LIPITOR) 20 MG tablet TAKE 1 TABLET ONCE DAILY (Patient taking differently: every evening. ) 90 tablet 3    coenzyme Q10 200 mg capsule       fluticasone propionate (FLONASE) 50 mcg/actuation nasal spray USE 2 SPRAYS (=100 MCG     TOTAL) IN EACH NOSTRIL ONCEDAILY (Patient taking differently: every evening. ) 16 g 2    lisinopriL 10 MG tablet Take 1 tablet (10 mg total) by mouth once daily. 90 tablet 3    magnesium gluconate 27.5 mg magne- sium (500 mg) Tab Take by mouth once.      multivitamin (THERAGRAN) per tablet Take 1 tablet by mouth once  daily.      naproxen sodium (ANAPROX) 220 MG tablet Take 220 mg by mouth 2 (two) times daily with meals. Takes 2 tabs twice daily      neomycin-polymyxin-dexamethasone (DEXACINE) 3.5-10,000-0.1 mg-unit/g-% Oint Use daily to eyelids as needed 3.5 g 0    omeprazole (PRILOSEC) 20 MG capsule Take 1 capsule (20 mg total) by mouth once daily. 90 capsule 3    riboflavin, vitamin B2, (RIBOFLAVIN ORAL) Take 100 mg by mouth.      vitamin D (VITAMIN D3) 1000 units Tab Take 2,000 Units by mouth once daily.       No current facility-administered medications on file prior to visit.        Anticoagulation:  Yes - ASA 81    Physical Exam:  weight 201 lb/91.3 kg    Physical Exam   Constitutional: He is oriented to person, place, and time and well-developed, well-nourished, and in no distress. No distress.   Eyes: No scleral icterus.   Cardiovascular: Normal rate and intact distal pulses.   Pulmonary/Chest: Effort normal. No respiratory distress.   Abdominal: Soft. He exhibits no distension. There is no tenderness.   Genitourinary: Penis normal. No discharge found.   Musculoskeletal: Normal range of motion. He exhibits no tenderness.   Neurological: He is alert and oriented to person, place, and time.   Skin: Skin is warm and dry. No rash noted.   Psychiatric: Mood, affect and judgment normal.         Labs:  Urine dipstick today shows negative for all components.    Lab Results   Component Value Date    WBC 6.18 12/16/2019    HGB 12.4 (L) 12/16/2019    HCT 39.3 (L) 12/16/2019    MCV 95 12/16/2019     (H) 12/16/2019           BMP  Lab Results   Component Value Date     12/16/2019    K 4.6 12/16/2019     12/16/2019    CO2 28 12/16/2019    BUN 21 12/16/2019    CREATININE 1.2 12/16/2019    CALCIUM 9.4 12/16/2019    ANIONGAP 6 (L) 12/16/2019    ESTGFRAFRICA >60.0 12/16/2019    EGFRNONAA >60.0 12/16/2019       Lab Results   Component Value Date    PSA 6.4 (H) 12/18/2018    PSA 6.6 (H) 12/14/2016    PSA 5.4 (H)  12/15/2015    PSA 4.3 (H) 12/15/2014    PSA 3.3 12/11/2013    PSA 2.93 05/24/2012    PSA 2.7 07/11/2011    PSA 2.1 08/11/2010    PSA 2.4 09/22/2009    PSA 2.2 07/30/2009    PSADIAG 10.3 (H) 12/16/2019    PSADIAG 5.7 (H) 12/29/2017    PSADIAG 5.7 (H) 12/29/2017    PSADIAG 5.1 (H) 04/18/2016       Imaging:  mpMRI prostate (1/10/2020) :1. Severely limited examination secondary to metallic hardware artifact from patient's left hip prosthesis, with essentially nondiagnostic diffusion-weighted imaging portion of the examination.  2. Questionable T2 hypointense lesion within the left prostatic base posterior peripheral zone laterally.  Overall Assessment: PIRADS score is unable to be obtained.    Assessment: Ha Madrid is a 69 y.o. male with [id number] Ruth 4+3=7 prostate adenocarcinoma.      Plan:   1. To OR on 04//29/2020 for RALP with BPLND  2. Consents signed   3. Type and screen ordered  4. I have explained the risk, benefits, and alternatives of the procedure in detail. The patient voices understanding and all questions have been answered. The patient agrees to proceed as planned.  5. Pre-operative clearance from PCP obtained.  6. Take ASA 81, was advised to stop taking 7 days prior to surgery. Patient has expressed understanding.  7. COVID screening test ordered for Monday 4/27. Patient understands importance of this test.      Itz Kc MD

## 2020-04-27 ENCOUNTER — LAB VISIT (OUTPATIENT)
Dept: INTERNAL MEDICINE | Facility: CLINIC | Age: 70
End: 2020-04-27
Payer: MEDICARE

## 2020-04-27 DIAGNOSIS — C61 PROSTATE CANCER: ICD-10-CM

## 2020-04-27 LAB — SARS-COV-2 RNA RESP QL NAA+PROBE: NOT DETECTED

## 2020-04-27 PROCEDURE — U0002 COVID-19 LAB TEST NON-CDC: HCPCS

## 2020-04-28 ENCOUNTER — ANESTHESIA EVENT (OUTPATIENT)
Dept: SURGERY | Facility: HOSPITAL | Age: 70
End: 2020-04-28
Payer: MEDICARE

## 2020-04-28 ENCOUNTER — TELEPHONE (OUTPATIENT)
Dept: UROLOGY | Facility: CLINIC | Age: 70
End: 2020-04-28

## 2020-04-28 NOTE — TELEPHONE ENCOUNTER
Called patient to confirm surgery with Dr. Orozco on 4/29/2020.  Give arrival time of 6am and instructions as follows.  Pt should be on clear liquids the whole day prior to surgery.  Pt should do 2 rectal fleet enemas anytime on the day prior to surgery. Pt must take a shower or bath with Hibiclens soap the night before surgery and also the morning of surgery.  The pt must also drink a 12oz bottle of Gatorade or Powerade 2 to 3 hours prior to the arrival time of surgery.  Also, pt was instructed to continue Kegel exercises.  Pt verbalized understanding.

## 2020-04-28 NOTE — ANESTHESIA PREPROCEDURE EVALUATION
[facility]  Anesthesia Pre-Operative Evaluation         Patient Name: Ha Madrid  YOB: 1950  MRN: [id number]    SUBJECTIVE:     Pre-operative evaluation for Procedure(s) (LRB):  XI ROBOTIC PROSTATECTOMY (N/A)     04/28/2020    Ha Madrid is a 69 y.o. male w/ a significant PMHx of HTN, HLD, prostate cancer who presents for prostatectomy.     Patient now presents for the above procedure(s).      LDA: None documented.    Prev airway: None documented.    Drips: None documented.    Patient Active Problem List   Diagnosis    Anemia    Sleep difficulties: aggravated by leg discomfort ( anterior thigh) Ambien with an Aleve    Hypertension    Hyperlipidemia    Abnormal CT lung screening: follow up pulmonary nodule    Thyroid condition: calcifications, 1979 thyroid lobectomy    Colon polyps:12/13 follow up in 5 years    Special screening for malignant neoplasms, colon    Personal history of colonic polyps    Basal cell carcinoma, face    Screening for colon cancer    Family history of colon cancer    Trigger ring finger of right hand    Prostate cancer       Review of patient's allergies indicates:   Allergen Reactions    No known allergies        Current Outpatient Medications:  No current facility-administered medications for this encounter.     Current Outpatient Medications:     aspirin (ECOTRIN) 81 MG EC tablet, Take 81 mg by mouth once daily. , Disp: , Rfl:     atorvastatin (LIPITOR) 20 MG tablet, TAKE 1 TABLET ONCE DAILY (Patient taking differently: every evening. ), Disp: 90 tablet, Rfl: 3    coenzyme Q10 200 mg capsule, , Disp: , Rfl:     fluticasone propionate (FLONASE) 50 mcg/actuation nasal spray, USE 2 SPRAYS (=100 MCG     TOTAL) IN EACH NOSTRIL ONCEDAILY (Patient taking differently: every evening. ), Disp: 16 g, Rfl: 2    lisinopriL 10 MG tablet, Take 1 tablet (10 mg total) by mouth once daily., Disp: 90 tablet, Rfl: 3     magnesium gluconate 27.5 mg magne- sium (500 mg) Tab, Take by mouth once., Disp: , Rfl:     multivitamin (THERAGRAN) per tablet, Take 1 tablet by mouth once daily., Disp: , Rfl:     naproxen sodium (ANAPROX) 220 MG tablet, Take 220 mg by mouth 2 (two) times daily with meals. Takes 2 tabs twice daily, Disp: , Rfl:     neomycin-polymyxin-dexamethasone (DEXACINE) 3.5-10,000-0.1 mg-unit/g-% Oint, Use daily to eyelids as needed, Disp: 3.5 g, Rfl: 0    omeprazole (PRILOSEC) 20 MG capsule, Take 1 capsule (20 mg total) by mouth once daily., Disp: 90 capsule, Rfl: 3    riboflavin, vitamin B2, (RIBOFLAVIN ORAL), Take 100 mg by mouth., Disp: , Rfl:     vitamin D (VITAMIN D3) 1000 units Tab, Take 2,000 Units by mouth once daily., Disp: , Rfl:     Past Surgical History:   Procedure Laterality Date    COLONOSCOPY N/A 12/24/2018    Procedure: COLONOSCOPY;  Surgeon: NICKOLAS Umanzor MD;  Location: [address]);  Service: Endoscopy;  Laterality: N/A;  Dr. Pop patient however patient wants December (needs after 12/20) and wants Dr. Umanzor if  cannot do December/ pt okay with Dec 24th/ wants NO SEDATION - sm    excision of basal cell ca on face      left hip      THYROIDECTOMY, PARTIAL      TONSILLECTOMY      VASECTOMY         Social History     Socioeconomic History    Marital status:      Spouse name: Not on file    Number of children: Not on file    Years of education: Not on file    Highest education level: Not on file   Occupational History    Occupation: Physician   Social Needs    Financial resource strain: Not hard at all    Food insecurity:     Worry: Never true     Inability: Never true    Transportation needs:     Medical: No     Non-medical: No   Tobacco Use    Smoking status: Former Smoker     Packs/day: 0.50     Years: 15.00     Pack years: 7.50     Types: Cigarettes    Smokeless tobacco: Never Used   Substance and Sexual Activity    Alcohol use: No     Frequency: Never     Drug use: No    Sexual activity: Yes   Lifestyle    Physical activity:     Days per week: 4 days     Minutes per session: 140 min    Stress: Not at all   Relationships    Social connections:     Talks on phone: Twice a week     Gets together: More than three times a week     Attends [employer] service: Not on file     Active member of club or organization: Yes     Attends meetings of clubs or organizations: Patient refused     Relationship status:    Other Topics Concern    Not on file   Social History Narrative    Not on file       OBJECTIVE:     Vital Signs Range (Last 24H):         Significant Labs:  Lab Results   Component Value Date    WBC 5.51 04/24/2020    HGB 13.3 (L) 04/24/2020    HCT 42.1 04/24/2020     (H) 04/24/2020    CHOL 144 12/16/2019    TRIG 50 12/16/2019    HDL 51 12/16/2019    ALT 37 04/24/2020    AST 40 04/24/2020     04/24/2020    K 5.3 (H) 04/24/2020     04/24/2020    CREATININE 1.2 04/24/2020    BUN 28 (H) 04/24/2020    CO2 25 04/24/2020    TSH 1.703 12/14/2016    PSA 6.4 (H) 12/18/2018    INR 1.8 (H) 10/13/2009       Diagnostic Studies: No relevant studies.    EKG:   Results for orders placed or performed during the hospital encounter of 12/19/19   EKG 12-lead    Collection Time: 12/19/19 12:59 PM    Narrative    Test Reason : I10,    Vent. Rate : 052 BPM     Atrial Rate : 052 BPM     P-R Int : 114 ms          QRS Dur : 088 ms      QT Int : 418 ms       P-R-T Axes : 024 019 042 degrees     QTc Int : 388 ms    Sinus bradycardia with occasional Premature ventricular complexes  Otherwise normal ECG  When compared with ECG of 13-DEC-2017 15:33,  Premature ventricular complexes are now Present  Premature atrial complexes are no longer Present  Confirmed by ELVA EDWARDS MD ([id number]) on 12/19/2019 1:47:13 PM    Referred By: JODEE MARY           Confirmed By:ELVA EDWARDS MD       2D ECHO:  TTE:  No results found for this or any previous visit.    LENNOX:  No results found for  this or any previous visit.    ASSESSMENT/PLAN:       Anesthesia Evaluation    I have reviewed the Patient Summary Reports.     I have reviewed the Medications.     Review of Systems  Anesthesia Hx:  No problems with previous Anesthesia  History of prior surgery of interest to airway management or planning:  Denies Personal Hx of Anesthesia complications.   Social:  Former Smoker    Hematology/Oncology:  Hematology Normal      Current/Recent Cancer. Oncology Comments: Also basal cell cured by excision    EENT/Dental:EENT/Dental Normal   Cardiovascular:   Hypertension, well controlled Denies MI.  Denies CAD.    Denies CABG/stent.   Denies Angina. hyperlipidemia Would ride exercycle qod for 30 minutes, 8.5 miles without difficutlly.  Stopped 4 months ago due to loss of access to gym, not due to symptoms.  Currently renovating house, moving walls, hanging and floating sheetrock without limitation.   Pulmonary:  Pulmonary Normal  Denies COPD.  Denies Asthma.  Denies Shortness of breath.    Renal/:   Denies Chronic Renal Disease.  Prostate cancer   Hepatic/GI:   GERD Denies Liver Disease.    Neurological:  Neurology Normal Denies TIA.  Denies CVA. Denies Seizures. bilat carpal tunnel and right ulnar neuropathy.   Endocrine:  Endocrine Normal Denies Diabetes.        Physical Exam  General:  Well nourished    Airway/Jaw/Neck:  Airway Findings: Mouth Opening: Normal Tongue: Normal  General Airway Assessment: Adult, Average  Mallampati: II  TM Distance: Normal, at least 6 cm  Jaw/Neck Findings:  Neck ROM: Normal ROM            Mental Status:  Mental Status Findings:  Cooperative, Alert and Oriented         Anesthesia Plan  Type of Anesthesia, risks & benefits discussed:  Anesthesia Type:  general, regional  Patient's Preference:   Intra-op Monitoring Plan: standard ASA monitors  Intra-op Monitoring Plan Comments:   Post Op Pain Control Plan: per primary service following discharge from PACU and multimodal analgesia  Post  Op Pain Control Plan Comments: As per surgeon's plan  Induction:   IV  Beta Blocker:  Patient is not currently on a Beta-Blocker (No further documentation required).       Informed Consent: Patient understands risks and agrees with Anesthesia plan.  Questions answered. Anesthesia consent signed with patient.  ASA Score: 2     Day of Surgery Review of History & Physical:    H&P update referred to the surgeon.     Anesthesia Plan Notes: K+ 5.3 on preop labs.  Repeat ordered for AM of surgery.          Ready For Surgery From Anesthesia Perspective.

## 2020-04-29 ENCOUNTER — ANESTHESIA (OUTPATIENT)
Dept: SURGERY | Facility: HOSPITAL | Age: 70
End: 2020-04-29
Payer: MEDICARE

## 2020-04-29 ENCOUNTER — HOSPITAL ENCOUNTER (OUTPATIENT)
Facility: HOSPITAL | Age: 70
Discharge: HOME | End: 2020-04-29
Attending: UROLOGY | Admitting: UROLOGY
Payer: MEDICARE

## 2020-04-29 ENCOUNTER — PATIENT MESSAGE (OUTPATIENT)
Dept: ADMINISTRATIVE | Facility: OTHER | Age: 70
End: 2020-04-29

## 2020-04-29 VITALS
OXYGEN SATURATION: 97 % | SYSTOLIC BLOOD PRESSURE: 113 MMHG | RESPIRATION RATE: 16 BRPM | BODY MASS INDEX: 23.74 KG/M2 | DIASTOLIC BLOOD PRESSURE: 57 MMHG | WEIGHT: 185 LBS | TEMPERATURE: 97 F | HEART RATE: 52 BPM | HEIGHT: 74 IN

## 2020-04-29 DIAGNOSIS — C61 PROSTATE CANCER: Primary | ICD-10-CM

## 2020-04-29 LAB — POTASSIUM SERPL-SCNC: 4.4 MMOL/L (ref 3.5–5.1)

## 2020-04-29 PROCEDURE — 71000015 HC POSTOP RECOV 1ST HR: Performed by: UROLOGY

## 2020-04-29 PROCEDURE — 55866 PR LAP,PROSTATECTOMY,RADICAL,W/NERVE SPARE,INCL ROBOTIC: ICD-10-PCS | Mod: ,,, | Performed by: UROLOGY

## 2020-04-29 PROCEDURE — 88307 PR  SURG PATH,LEVEL V: ICD-10-PCS | Mod: 26,,, | Performed by: PATHOLOGY

## 2020-04-29 PROCEDURE — 37000009 HC ANESTHESIA EA ADD 15 MINS: Performed by: UROLOGY

## 2020-04-29 PROCEDURE — 25000003 PHARM REV CODE 250: Performed by: STUDENT IN AN ORGANIZED HEALTH CARE EDUCATION/TRAINING PROGRAM

## 2020-04-29 PROCEDURE — 63600175 PHARM REV CODE 636 W HCPCS: Performed by: STUDENT IN AN ORGANIZED HEALTH CARE EDUCATION/TRAINING PROGRAM

## 2020-04-29 PROCEDURE — 88309 TISSUE EXAM BY PATHOLOGIST: CPT | Performed by: PATHOLOGY

## 2020-04-29 PROCEDURE — 88309 PR  SURG PATH,LEVEL VI: ICD-10-PCS | Mod: 26,,, | Performed by: PATHOLOGY

## 2020-04-29 PROCEDURE — 64461 PVB THORACIC SINGLE INJ SITE: CPT | Performed by: STUDENT IN AN ORGANIZED HEALTH CARE EDUCATION/TRAINING PROGRAM

## 2020-04-29 PROCEDURE — 71000033 HC RECOVERY, INTIAL HOUR: Performed by: UROLOGY

## 2020-04-29 PROCEDURE — S0020 INJECTION, BUPIVICAINE HYDRO: HCPCS | Performed by: STUDENT IN AN ORGANIZED HEALTH CARE EDUCATION/TRAINING PROGRAM

## 2020-04-29 PROCEDURE — 88307 TISSUE EXAM BY PATHOLOGIST: CPT | Mod: 26,,, | Performed by: PATHOLOGY

## 2020-04-29 PROCEDURE — G0378 HOSPITAL OBSERVATION PER HR: HCPCS

## 2020-04-29 PROCEDURE — 38571 LAPAROSCOPY LYMPHADENECTOMY: CPT | Mod: 51,,, | Performed by: UROLOGY

## 2020-04-29 PROCEDURE — 84132 ASSAY OF SERUM POTASSIUM: CPT

## 2020-04-29 PROCEDURE — 27201423 OPTIME MED/SURG SUP & DEVICES STERILE SUPPLY: Performed by: UROLOGY

## 2020-04-29 PROCEDURE — 88305 TISSUE EXAM BY PATHOLOGIST: ICD-10-PCS | Mod: 26,,, | Performed by: PATHOLOGY

## 2020-04-29 PROCEDURE — 88305 TISSUE EXAM BY PATHOLOGIST: CPT | Mod: 59 | Performed by: PATHOLOGY

## 2020-04-29 PROCEDURE — 88305 TISSUE EXAM BY PATHOLOGIST: CPT | Mod: 26,,, | Performed by: PATHOLOGY

## 2020-04-29 PROCEDURE — 27100025 HC TUBING, SET FLUID WARMER: Performed by: ANESTHESIOLOGY

## 2020-04-29 PROCEDURE — 94799 UNLISTED PULMONARY SVC/PX: CPT

## 2020-04-29 PROCEDURE — 38571 PR LAP,PELVIC LYMPHADENECTOMY: ICD-10-PCS | Mod: 51,,, | Performed by: UROLOGY

## 2020-04-29 PROCEDURE — 55866 LAPS SURG PRST8ECT RPBIC RAD: CPT | Mod: ,,, | Performed by: UROLOGY

## 2020-04-29 PROCEDURE — C1729 CATH, DRAINAGE: HCPCS | Performed by: UROLOGY

## 2020-04-29 PROCEDURE — 71000016 HC POSTOP RECOV ADDL HR: Performed by: UROLOGY

## 2020-04-29 PROCEDURE — D9220A PRA ANESTHESIA: Mod: ,,, | Performed by: ANESTHESIOLOGY

## 2020-04-29 PROCEDURE — D9220A PRA ANESTHESIA: ICD-10-PCS | Mod: ,,, | Performed by: ANESTHESIOLOGY

## 2020-04-29 PROCEDURE — 36000713 HC OR TIME LEV V EA ADD 15 MIN: Performed by: UROLOGY

## 2020-04-29 PROCEDURE — 64461 PVB THORACIC SINGLE INJ SITE: CPT | Mod: 59,50,, | Performed by: ANESTHESIOLOGY

## 2020-04-29 PROCEDURE — 94761 N-INVAS EAR/PLS OXIMETRY MLT: CPT

## 2020-04-29 PROCEDURE — 76942 ECHO GUIDE FOR BIOPSY: CPT | Performed by: STUDENT IN AN ORGANIZED HEALTH CARE EDUCATION/TRAINING PROGRAM

## 2020-04-29 PROCEDURE — 36000712 HC OR TIME LEV V 1ST 15 MIN: Performed by: UROLOGY

## 2020-04-29 PROCEDURE — 37000008 HC ANESTHESIA 1ST 15 MINUTES: Performed by: UROLOGY

## 2020-04-29 PROCEDURE — 63600175 PHARM REV CODE 636 W HCPCS: Performed by: NURSE ANESTHETIST, CERTIFIED REGISTERED

## 2020-04-29 PROCEDURE — 88309 TISSUE EXAM BY PATHOLOGIST: CPT | Mod: 26,,, | Performed by: PATHOLOGY

## 2020-04-29 PROCEDURE — 64461: ICD-10-PCS | Mod: 59,50,, | Performed by: ANESTHESIOLOGY

## 2020-04-29 RX ORDER — POLYETHYLENE GLYCOL 3350 17 G/17G
17 POWDER, FOR SOLUTION ORAL DAILY
Status: DISCONTINUED | OUTPATIENT
Start: 2020-04-29 | End: 2020-04-29 | Stop reason: HOSPADM

## 2020-04-29 RX ORDER — ONDANSETRON 2 MG/ML
4 INJECTION INTRAMUSCULAR; INTRAVENOUS DAILY PRN
Status: DISCONTINUED | OUTPATIENT
Start: 2020-04-29 | End: 2020-04-29 | Stop reason: HOSPADM

## 2020-04-29 RX ORDER — FENTANYL CITRATE 50 UG/ML
INJECTION, SOLUTION INTRAMUSCULAR; INTRAVENOUS
Status: DISCONTINUED | OUTPATIENT
Start: 2020-04-29 | End: 2020-04-29

## 2020-04-29 RX ORDER — KETOROLAC TROMETHAMINE 30 MG/ML
15 INJECTION, SOLUTION INTRAMUSCULAR; INTRAVENOUS EVERY 8 HOURS
Status: DISCONTINUED | OUTPATIENT
Start: 2020-04-29 | End: 2020-04-29 | Stop reason: HOSPADM

## 2020-04-29 RX ORDER — KETAMINE HCL IN 0.9 % NACL 50 MG/5 ML
SYRINGE (ML) INTRAVENOUS
Status: DISCONTINUED | OUTPATIENT
Start: 2020-04-29 | End: 2020-04-29

## 2020-04-29 RX ORDER — PREGABALIN 50 MG/1
150 CAPSULE ORAL NIGHTLY
Status: DISCONTINUED | OUTPATIENT
Start: 2020-04-29 | End: 2020-04-29 | Stop reason: HOSPADM

## 2020-04-29 RX ORDER — GLYCOPYRROLATE 0.2 MG/ML
INJECTION INTRAMUSCULAR; INTRAVENOUS
Status: DISCONTINUED | OUTPATIENT
Start: 2020-04-29 | End: 2020-04-29

## 2020-04-29 RX ORDER — PANTOPRAZOLE SODIUM 40 MG/1
40 TABLET, DELAYED RELEASE ORAL DAILY
Status: DISCONTINUED | OUTPATIENT
Start: 2020-04-29 | End: 2020-04-29 | Stop reason: HOSPADM

## 2020-04-29 RX ORDER — CEFAZOLIN SODIUM 1 G/3ML
2 INJECTION, POWDER, FOR SOLUTION INTRAMUSCULAR; INTRAVENOUS
Status: COMPLETED | OUTPATIENT
Start: 2020-04-29 | End: 2020-04-29

## 2020-04-29 RX ORDER — FENTANYL CITRATE 50 UG/ML
25 INJECTION, SOLUTION INTRAMUSCULAR; INTRAVENOUS EVERY 5 MIN PRN
Status: DISCONTINUED | OUTPATIENT
Start: 2020-04-29 | End: 2020-04-29

## 2020-04-29 RX ORDER — ATORVASTATIN CALCIUM 20 MG/1
20 TABLET, FILM COATED ORAL NIGHTLY
Status: DISCONTINUED | OUTPATIENT
Start: 2020-04-29 | End: 2020-04-29 | Stop reason: HOSPADM

## 2020-04-29 RX ORDER — ROCURONIUM BROMIDE 10 MG/ML
INJECTION, SOLUTION INTRAVENOUS
Status: DISCONTINUED | OUTPATIENT
Start: 2020-04-29 | End: 2020-04-29

## 2020-04-29 RX ORDER — PHENYLEPHRINE HYDROCHLORIDE 10 MG/ML
INJECTION INTRAVENOUS
Status: DISCONTINUED | OUTPATIENT
Start: 2020-04-29 | End: 2020-04-29

## 2020-04-29 RX ORDER — ACETAMINOPHEN 500 MG
1000 TABLET ORAL
Status: COMPLETED | OUTPATIENT
Start: 2020-04-29 | End: 2020-04-29

## 2020-04-29 RX ORDER — POLYETHYLENE GLYCOL 3350 17 G/17G
17 POWDER, FOR SOLUTION ORAL DAILY
Qty: 510 G | Refills: 0 | Status: SHIPPED | OUTPATIENT
Start: 2020-04-29 | End: 2020-10-29

## 2020-04-29 RX ORDER — FENTANYL CITRATE 50 UG/ML
25 INJECTION, SOLUTION INTRAMUSCULAR; INTRAVENOUS EVERY 5 MIN PRN
Status: DISCONTINUED | OUTPATIENT
Start: 2020-04-29 | End: 2020-04-29 | Stop reason: HOSPADM

## 2020-04-29 RX ORDER — ACETAMINOPHEN 500 MG
1000 TABLET ORAL EVERY 6 HOURS
Status: DISCONTINUED | OUTPATIENT
Start: 2020-04-29 | End: 2020-04-29 | Stop reason: HOSPADM

## 2020-04-29 RX ORDER — METHOCARBAMOL 500 MG/1
1000 TABLET, FILM COATED ORAL EVERY 6 HOURS PRN
Status: DISCONTINUED | OUTPATIENT
Start: 2020-04-29 | End: 2020-04-29 | Stop reason: HOSPADM

## 2020-04-29 RX ORDER — SODIUM CHLORIDE 0.9 % (FLUSH) 0.9 %
10 SYRINGE (ML) INJECTION
Status: DISCONTINUED | OUTPATIENT
Start: 2020-04-29 | End: 2020-04-29 | Stop reason: HOSPADM

## 2020-04-29 RX ORDER — OXYCODONE HYDROCHLORIDE 5 MG/1
5 TABLET ORAL EVERY 4 HOURS PRN
Qty: 10 TABLET | Refills: 0 | Status: SHIPPED | OUTPATIENT
Start: 2020-04-29 | End: 2020-10-29

## 2020-04-29 RX ORDER — ASPIRIN 81 MG/1
81 TABLET ORAL DAILY
Status: DISCONTINUED | OUTPATIENT
Start: 2020-04-29 | End: 2020-04-29 | Stop reason: HOSPADM

## 2020-04-29 RX ORDER — DEXAMETHASONE SODIUM PHOSPHATE 4 MG/ML
INJECTION, SOLUTION INTRA-ARTICULAR; INTRALESIONAL; INTRAMUSCULAR; INTRAVENOUS; SOFT TISSUE
Status: DISCONTINUED | OUTPATIENT
Start: 2020-04-29 | End: 2020-04-29

## 2020-04-29 RX ORDER — NEOSTIGMINE METHYLSULFATE 1 MG/ML
INJECTION, SOLUTION INTRAVENOUS
Status: DISCONTINUED | OUTPATIENT
Start: 2020-04-29 | End: 2020-04-29

## 2020-04-29 RX ORDER — ONDANSETRON 2 MG/ML
INJECTION INTRAMUSCULAR; INTRAVENOUS
Status: DISCONTINUED | OUTPATIENT
Start: 2020-04-29 | End: 2020-04-29

## 2020-04-29 RX ORDER — LISINOPRIL 10 MG/1
10 TABLET ORAL NIGHTLY
Status: DISCONTINUED | OUTPATIENT
Start: 2020-04-29 | End: 2020-04-29 | Stop reason: HOSPADM

## 2020-04-29 RX ORDER — MIDAZOLAM HYDROCHLORIDE 1 MG/ML
0.5 INJECTION INTRAMUSCULAR; INTRAVENOUS
Status: DISCONTINUED | OUTPATIENT
Start: 2020-04-29 | End: 2020-04-29

## 2020-04-29 RX ORDER — PROPOFOL 10 MG/ML
VIAL (ML) INTRAVENOUS
Status: DISCONTINUED | OUTPATIENT
Start: 2020-04-29 | End: 2020-04-29

## 2020-04-29 RX ORDER — LIDOCAINE HCL/PF 100 MG/5ML
SYRINGE (ML) INTRAVENOUS
Status: DISCONTINUED | OUTPATIENT
Start: 2020-04-29 | End: 2020-04-29

## 2020-04-29 RX ORDER — SULFAMETHOXAZOLE AND TRIMETHOPRIM 400; 80 MG/1; MG/1
1 TABLET ORAL DAILY
Qty: 7 TABLET | Refills: 0 | Status: SHIPPED | OUTPATIENT
Start: 2020-04-29 | End: 2020-05-06

## 2020-04-29 RX ORDER — ONDANSETRON 8 MG/1
8 TABLET, ORALLY DISINTEGRATING ORAL EVERY 6 HOURS PRN
Status: DISCONTINUED | OUTPATIENT
Start: 2020-04-29 | End: 2020-04-29 | Stop reason: HOSPADM

## 2020-04-29 RX ORDER — OXYCODONE HYDROCHLORIDE 5 MG/1
5 TABLET ORAL EVERY 4 HOURS PRN
Status: DISCONTINUED | OUTPATIENT
Start: 2020-04-29 | End: 2020-04-29 | Stop reason: HOSPADM

## 2020-04-29 RX ORDER — BUPIVACAINE HYDROCHLORIDE 7.5 MG/ML
INJECTION, SOLUTION EPIDURAL; RETROBULBAR
Status: DISCONTINUED | OUTPATIENT
Start: 2020-04-29 | End: 2020-04-29

## 2020-04-29 RX ORDER — SODIUM CHLORIDE 9 MG/ML
INJECTION, SOLUTION INTRAVENOUS CONTINUOUS
Status: DISCONTINUED | OUTPATIENT
Start: 2020-04-29 | End: 2020-04-29 | Stop reason: HOSPADM

## 2020-04-29 RX ORDER — PREGABALIN 75 MG/1
75 CAPSULE ORAL
Status: COMPLETED | OUTPATIENT
Start: 2020-04-29 | End: 2020-04-29

## 2020-04-29 RX ORDER — KETOROLAC TROMETHAMINE 30 MG/ML
15 INJECTION, SOLUTION INTRAMUSCULAR; INTRAVENOUS
Status: COMPLETED | OUTPATIENT
Start: 2020-04-29 | End: 2020-04-29

## 2020-04-29 RX ORDER — HEPARIN SODIUM 5000 [USP'U]/ML
5000 INJECTION, SOLUTION INTRAVENOUS; SUBCUTANEOUS EVERY 8 HOURS
Status: DISCONTINUED | OUTPATIENT
Start: 2020-04-29 | End: 2020-04-29

## 2020-04-29 RX ORDER — HEPARIN SODIUM 5000 [USP'U]/ML
5000 INJECTION, SOLUTION INTRAVENOUS; SUBCUTANEOUS EVERY 8 HOURS
Status: DISCONTINUED | OUTPATIENT
Start: 2020-04-29 | End: 2020-04-29 | Stop reason: HOSPADM

## 2020-04-29 RX ORDER — EPHEDRINE SULFATE 50 MG/ML
INJECTION, SOLUTION INTRAVENOUS
Status: DISCONTINUED | OUTPATIENT
Start: 2020-04-29 | End: 2020-04-29

## 2020-04-29 RX ORDER — SODIUM CHLORIDE 9 MG/ML
INJECTION, SOLUTION INTRAVENOUS CONTINUOUS PRN
Status: DISCONTINUED | OUTPATIENT
Start: 2020-04-29 | End: 2020-04-29

## 2020-04-29 RX ADMIN — MIDAZOLAM HYDROCHLORIDE 1 MG: 1 INJECTION, SOLUTION INTRAMUSCULAR; INTRAVENOUS at 07:04

## 2020-04-29 RX ADMIN — BUPIVACAINE HYDROCHLORIDE 30 ML: 7.5 INJECTION, SOLUTION EPIDURAL; RETROBULBAR at 07:04

## 2020-04-29 RX ADMIN — EPHEDRINE SULFATE 5 MG: 50 INJECTION INTRAVENOUS at 10:04

## 2020-04-29 RX ADMIN — OXYCODONE HYDROCHLORIDE 5 MG: 5 TABLET ORAL at 05:04

## 2020-04-29 RX ADMIN — SODIUM CHLORIDE: 0.9 INJECTION, SOLUTION INTRAVENOUS at 07:04

## 2020-04-29 RX ADMIN — POLYETHYLENE GLYCOL 3350 17 G: 17 POWDER, FOR SOLUTION ORAL at 12:04

## 2020-04-29 RX ADMIN — NEOSTIGMINE METHYLSULFATE 5 MG: 1 INJECTION INTRAVENOUS at 11:04

## 2020-04-29 RX ADMIN — SODIUM CHLORIDE: 0.9 INJECTION, SOLUTION INTRAVENOUS at 12:04

## 2020-04-29 RX ADMIN — SODIUM CHLORIDE, SODIUM GLUCONATE, SODIUM ACETATE, POTASSIUM CHLORIDE, MAGNESIUM CHLORIDE, SODIUM PHOSPHATE, DIBASIC, AND POTASSIUM PHOSPHATE: .53; .5; .37; .037; .03; .012; .00082 INJECTION, SOLUTION INTRAVENOUS at 08:04

## 2020-04-29 RX ADMIN — ASPIRIN 81 MG: 81 TABLET, COATED ORAL at 12:04

## 2020-04-29 RX ADMIN — Medication 10 MG: at 09:04

## 2020-04-29 RX ADMIN — ONDANSETRON 4 MG: 2 INJECTION, SOLUTION INTRAMUSCULAR; INTRAVENOUS at 11:04

## 2020-04-29 RX ADMIN — ACETAMINOPHEN 1000 MG: 500 TABLET ORAL at 07:04

## 2020-04-29 RX ADMIN — HEPARIN SODIUM 5000 UNITS: 5000 INJECTION, SOLUTION INTRAVENOUS; SUBCUTANEOUS at 07:04

## 2020-04-29 RX ADMIN — KETOROLAC TROMETHAMINE 15 MG: 30 INJECTION, SOLUTION INTRAMUSCULAR at 02:04

## 2020-04-29 RX ADMIN — EPHEDRINE SULFATE 5 MG: 50 INJECTION INTRAVENOUS at 09:04

## 2020-04-29 RX ADMIN — ROCURONIUM BROMIDE 50 MG: 10 INJECTION, SOLUTION INTRAVENOUS at 08:04

## 2020-04-29 RX ADMIN — Medication 30 MG: at 08:04

## 2020-04-29 RX ADMIN — FENTANYL CITRATE 50 MCG: 50 INJECTION, SOLUTION INTRAMUSCULAR; INTRAVENOUS at 08:04

## 2020-04-29 RX ADMIN — GLYCOPYRROLATE 0.6 MG: 0.2 INJECTION, SOLUTION INTRAMUSCULAR; INTRAVENOUS at 11:04

## 2020-04-29 RX ADMIN — PHENYLEPHRINE HYDROCHLORIDE 100 MCG: 10 INJECTION INTRAVENOUS at 10:04

## 2020-04-29 RX ADMIN — GLYCOPYRROLATE 0.2 MG: 0.2 INJECTION, SOLUTION INTRAMUSCULAR; INTRAVENOUS at 08:04

## 2020-04-29 RX ADMIN — HEPARIN SODIUM 5000 UNITS: 5000 INJECTION, SOLUTION INTRAVENOUS; SUBCUTANEOUS at 02:04

## 2020-04-29 RX ADMIN — FENTANYL CITRATE 50 MCG: 50 INJECTION, SOLUTION INTRAMUSCULAR; INTRAVENOUS at 09:04

## 2020-04-29 RX ADMIN — PREGABALIN 75 MG: 75 CAPSULE ORAL at 07:04

## 2020-04-29 RX ADMIN — FENTANYL CITRATE 50 MCG: 50 INJECTION INTRAMUSCULAR; INTRAVENOUS at 07:04

## 2020-04-29 RX ADMIN — DEXAMETHASONE SODIUM PHOSPHATE 4 MG: 4 INJECTION, SOLUTION INTRAMUSCULAR; INTRAVENOUS at 08:04

## 2020-04-29 RX ADMIN — PHENYLEPHRINE HYDROCHLORIDE 100 MCG: 10 INJECTION INTRAVENOUS at 08:04

## 2020-04-29 RX ADMIN — ROCURONIUM BROMIDE 10 MG: 10 INJECTION, SOLUTION INTRAVENOUS at 10:04

## 2020-04-29 RX ADMIN — PHENYLEPHRINE HYDROCHLORIDE 100 MCG: 10 INJECTION INTRAVENOUS at 09:04

## 2020-04-29 RX ADMIN — PROPOFOL 150 MG: 10 INJECTION, EMULSION INTRAVENOUS at 08:04

## 2020-04-29 RX ADMIN — EPHEDRINE SULFATE 5 MG: 50 INJECTION INTRAVENOUS at 08:04

## 2020-04-29 RX ADMIN — PHENYLEPHRINE HYDROCHLORIDE 200 MCG: 10 INJECTION INTRAVENOUS at 10:04

## 2020-04-29 RX ADMIN — CEFAZOLIN 2 G: 330 INJECTION, POWDER, FOR SOLUTION INTRAMUSCULAR; INTRAVENOUS at 08:04

## 2020-04-29 RX ADMIN — ACETAMINOPHEN 1000 MG: 500 TABLET ORAL at 12:04

## 2020-04-29 RX ADMIN — PANTOPRAZOLE SODIUM 40 MG: 40 TABLET, DELAYED RELEASE ORAL at 12:04

## 2020-04-29 RX ADMIN — KETOROLAC TROMETHAMINE 15 MG: 30 INJECTION, SOLUTION INTRAMUSCULAR at 07:04

## 2020-04-29 RX ADMIN — LIDOCAINE HYDROCHLORIDE 100 MG: 20 INJECTION, SOLUTION INTRAVENOUS at 08:04

## 2020-04-29 RX ADMIN — OXYCODONE HYDROCHLORIDE 5 MG: 5 TABLET ORAL at 12:04

## 2020-04-29 NOTE — TRANSFER OF CARE
"Anesthesia Transfer of Care Note    Patient: Ha Madrid    Procedure(s) Performed: Procedure(s) (LRB):  XI ROBOTIC PROSTATECTOMY (N/A)    Patient location: PACU    Anesthesia Type: general    Transport from OR: Transported from OR on 6-10 L/min O2 by face mask with adequate spontaneous ventilation    Post pain: adequate analgesia    Post assessment: no apparent anesthetic complications    Post vital signs: stable    Level of consciousness: awake and alert    Nausea/Vomiting: no nausea/vomiting    Complications: none    Transfer of care protocol was followed      Last vitals:   Visit Vitals  BP (!) 147/86 (BP Location: Right arm, Patient Position: Lying)   Pulse 61   Temp 36.3 °C (97.3 °F) (Temporal)   Resp 16   Ht 6' 2" (1.88 m)   Wt 83.9 kg (185 lb)   SpO2 100%   BMI 23.75 kg/m²     "

## 2020-04-29 NOTE — ADDENDUM NOTE
Addendum  created 04/29/20 1254 by Clyde Valles MD    Intraprocedure Blocks edited, Sign clinical note

## 2020-04-29 NOTE — ANESTHESIA PROCEDURE NOTES
BL VERONICA Peripheral [place]    Patient location during procedure: pre-op   Block not for primary anesthetic.  Reason for block: at surgeon's request and post-op pain management   Post-op Pain Location: lower abdomen  Start time: 4/29/2020 7:40 AM  Timeout: 4/29/2020 7:35 AM   End time: 4/29/2020 7:45 AM    Staffing  Authorizing Provider: Colby Woodward MD  Performing Provider: Clyde Valles MD    Preanesthetic Checklist  Completed: patient identified, site marked, surgical consent, pre-op evaluation, timeout performed, IV checked, risks and benefits discussed and monitors and equipment checked  Peripheral Block  Patient position: sitting  Prep: ChloraPrep  Patient monitoring: heart rate, cardiac monitor, continuous pulse ox, continuous capnometry and frequent blood pressure checks  Block type: erector spinae plane (Erector Spinae Plane Block)  Laterality: bilateral  Injection technique: single shot  Location: T10-11  Needle  Needle type: Tuohy   Needle gauge: 17 G  Needle length: 3.5 in  Needle localization: anatomical landmarks and ultrasound guidance   -ultrasound image captured on disc.  Assessment  Injection assessment: negative aspiration, negative parasthesia and local visualized surrounding nerve  Paresthesia pain: none  Heart rate change: no  Slow fractionated injection: yes  Additional Notes  Patient tolerated well.  [facility]

## 2020-04-29 NOTE — ANESTHESIA PROCEDURE NOTES
Intubation  Performed by: Pancho Lamas MD  Authorized by: Boston Miguel Jr., MD     Intubation:     Induction:  Intravenous    Intubated:  Postinduction    Mask Ventilation:  Easy mask    Attempts:  1    Attempted By:  Resident anesthesiologist    Method of Intubation:  Direct    Blade:  Ellison 2    Laryngeal View Grade: Grade IIA - cords partially seen      Difficult Airway Encountered?: No      Complications:  None    Airway Device:  Oral endotracheal tube    Airway Device Size:  7.5    Style/Cuff Inflation:  Cuffed (inflated to minimal occlusive pressure)    Inflation Amount (mL):  8    Tube secured:  24    Secured at:  The teeth    Placement Verified By:  Capnometry    Complicating Factors:  Anterior larynx    Findings Post-Intubation:  BS equal bilateral

## 2020-04-29 NOTE — OP NOTE
4/29/2020      Procedure:   1) laparoscopic radical prostatectomy with robotic assistance  2) laparoscopic bilateral pelvic lymph node dissection    Preop diagnosis: Prostate Cancer  Postop diagnosis: Prostate Cancer, Stage T1C    Surgeon: Talon Orozco MD (present for the entire procedure)  Assistant: ESTEFANI Sims MD  EBL: 200 ccs    Wound Class: 2    Specimens removed: prostate, seminal vesicles, lymph nodes, bladder neck biopsy    Drain: Barnett      PROCEDURE NOTE:  Preoperatively the H&P were updated. Also confirmed that patient had had their hibiclens shower and preop hydration. After general endotracheal anesthesia, the patient was carefully positioned, padded, prepped and draped.  Positioning and padding was checked by the surgeon and the circulating nurse.  IV antibiotics were administered within 1 hour prior to making initial skin incision.  An OG tube was placed.  A Barnett catheter was placed.  A timeout was called. The patient's identity was confirmed with 2 identifiers.  The correct procedure, allergies, blood products were verified by the entire operative team.  This case occurred during the COVID crisis. He is a tier one case, further delay would have compromised his cancer care.                                                                      A Veress needle was placed at the supraumbilical position and the abdomen insufflated to 15 mmHg.  A 12 mm trocar was placed at this site and a 0 degree camera was introduced. The abdominal cavity was carefully inspected. There was no evidence of trauma to the peritoneal contents, nor any evidence of injury to the retroperitoneum.  All accessory trocars were then placed under direct vision.                                                                             The peritoneum posterior to the bladder was incised, the right vas deferens identified and divided.  The right seminal vesicle was gently dissected away from surrounding tissue with care being taken  not to cause stretch  or thermal injury to the lateral pedicle. A similar procedure was performed  on  the left side.  Both seminal vesicles were retracted anteriorly. Denonvilliers fascia was incised and this plane of dissection carried down to the level of the apex of the prostate.  A posterior/lateral release was performed bilaterally.                                                                                                                                            An anterior bladder drop was performed.  After dropping the bladder, a right sided pelvic lymph node dissection was completed and this was sent as permanent pathologic specimen #1.  The endopelvic fascia on the right  was gently dissected posteriorly, thus beginning the lateral release.  A left sided pelvic lymph node dissection was performed and this was sent  as permanent pathologic specimen #2.  Again, the endopelvic fascia was   gently dissected posteriorly, completing the lateral release.  A V stitch was used in a figure of eight fashion to control the dorsal venous complex. Excellent hemostasis was noted at this juncture. The plane between the bladder neck and prostate base was developed and the urethra was divided, a bladder neck sparing approach was utilized.  Excellent preservation of the internal sphincter was achieved circumferentially.     The bladder neck biopsy was sent as permanent section specimen #3.      After dividing the posterior bladder neck, the seminal vesicles and vas ampulla were revisualized and retracted anteriorly. The lateral pedicle on the right was controlled with bipolar cautery.      Cold scissors were used to athermically dissect the neurovascular bundle away  from the capsule of the prostate down to the level of the urethra, thus preserving the right neurovascular bundle.      A similar procedure was performed on the left side.    The urethra at the apex was divided preserving maximal urethral length.The  rectourethralis was divided. The specimen was placed in the right colic gutter. The pelvis was  irrigated and carefully inspected.  There was no evidence of rectal trauma and there was excellent hemostasis.  A vesicourethral anastomosis was then performed with a running suture of 3-0 Monocryl.  After completing the anastomosis, a fresh Barnett catheter was advanced into the bladder and the balloon  inflated.  The bladder was irrigated with 120  ccs twice. Once prior to tying the anastomotic suture and once after, there was noted to be no extravasation at the anastomosis. The specimen was placed in an endocatch bag.     Throughout the procedure the first assistant assisted with positioning, trocar placement, docking. She also provided assistance with exposure, visualization, traction/countertraction, suction and irrigation.     A drain .was not placed, specimen was removed from the field and port sites were closed.  Needle and sponge counts were correctThe patient was transferred to the Recovery Room in stable condition.

## 2020-04-29 NOTE — ANESTHESIA POSTPROCEDURE EVALUATION
Anesthesia Post Evaluation    Patient: Ha Madrid    Procedure(s) Performed: Procedure(s) (LRB):  XI ROBOTIC PROSTATECTOMY (N/A)    Final Anesthesia Type: general    Patient location during evaluation: PACU  Patient participation: Yes- Able to Participate  Level of consciousness: awake and alert and oriented  Post-procedure vital signs: reviewed and stable  Pain management: adequate  Airway patency: patent    PONV status at discharge: No PONV  Anesthetic complications: no      Cardiovascular status: stable  Respiratory status: unassisted, spontaneous ventilation and room air  Hydration status: euvolemic  Follow-up not needed.  Comments: Reports significant stuffiness to right nares.  No obvious abnormality to visual exam with pen light.  Offered nasal decongestant but he feels it is getting better and prefers conservative treatment.          Vitals Value Taken Time   /73 4/29/2020 12:17 PM   Temp 36.3 °C (97.3 °F) 4/29/2020 11:45 AM   Pulse 54 4/29/2020 12:23 PM   Resp 17 4/29/2020 12:23 PM   SpO2 99 % 4/29/2020 12:23 PM   Vitals shown include unvalidated device data.      No case tracking events are documented in the log.      Pain/Aguila Score: Pain Rating Prior to Med Admin: 3 (4/29/2020 12:20 PM)  Pain Rating Post Med Admin: 0 (4/29/2020  7:55 AM)  Aguila Score: 9 (4/29/2020  7:55 AM)

## 2020-04-29 NOTE — DISCHARGE SUMMARY
[facility]  Urology  Discharge Summary      Patient Name: Ha Madrid  MRN: [id number]  Admission Date: 4/29/2020  Hospital Length of Stay: 0 days  Discharge Date and Time: No discharge date for patient encounter.  Attending Physician: Mazin Orozco MD   Discharging Provider: Blanco Madison MD  Primary Care Physician: Brook Vasquez MD    HPI:      aH Madrid is a 69 y.o. male with [id number] [place] 4+3=7 prostate adenocarcinoma.       The patient initially presented with elevated PSA.  TRUS biopsy revealed the following:                                         LEFT                           RIGHT  BASE                          4+3 (3/3)                      4+3 (1/3)  MID                              4+3 (2/2)                      BPH  APEX                          4+3 (2/2)                      BPH     Date of Biopsy: 02/19/2020  PSA: 10.3  Volume: 58 g  Voiding complaints: present  ED: absent  Incontinence: absent     ARYAN: 6 - intermediate risk  NCCN: unfavorable intermediate     He is otherwise healthy. Retired colorectal surgeon. Denies significant cardiac or pulmonary disease. Does not have ANIBAL. Takes ASA 81 for prophylactic reasons. Stopped taking aspirin 3 days ago. No other blood thinners. Was seen by PCP 3 weeks ago for pre-operative clearance. Per PCP, he is healthy for upcoming urologic surgery.    Procedure(s) (LRB):  XI ROBOTIC PROSTATECTOMY (N/A)     Indwelling Lines/Drains at time of discharge:   Lines/Drains/Airways     Drain                 Urethral Catheter 04/29/20 0835 Non-latex;Straight-tip 16 Fr. less than 1 day                Hospital Course     Patient underwent the above described procedures (robotic assisted laparoscopic prostatectomy and pelvic lymphadenectomy) and tolerated the surgery well. He ambulated on POD 0 and tolerated a regular diet. He was stable for discharge on POD 0 ambulating in the halls, tolerating regular diet, with good urine output  and pain well controlled on oral pain medications. He was discharged home with oral antibiotics, oral pain medications, and stool softeners. He will follow up with Dr. Orozco in 1 week for swanson catheter removal.    Consults: none    Significant Diagnostic Studies: none    Pending Diagnostic Studies:     Procedure Component Value Units Date/Time    Specimen to Pathology, Surgery Urology [[id number]] Collected:  04/29/20 1104    Order Status:  Sent Lab Status:  In process Updated:  04/29/20 1215          Final Active Diagnoses:    Diagnosis Date Noted POA    PRINCIPAL PROBLEM:  Prostate cancer [C61] 02/20/2020 Yes      Problems Resolved During this Admission:       Discharged Condition: good    Disposition: Home or Self Care    Follow Up:  Follow-up Information     Mazin Orozco MD In 1 week.    Specialty:  Urology  Why:  For Swanson catheter removal  Contact information:  [address]  [facility] [address]  [phone]                 Patient Instructions:      Lifting restrictions     Notify your health care provider if you experience any of the following:  temperature >100.4     Notify your health care provider if you experience any of the following:  persistent nausea and vomiting or diarrhea     Notify your health care provider if you experience any of the following:  severe uncontrolled pain     Notify your health care provider if you experience any of the following:  redness, tenderness, or signs of infection (pain, swelling, redness, odor or green/yellow discharge around incision site)     Notify your health care provider if you experience any of the following:  difficulty breathing or increased cough     Notify your health care provider if you experience any of the following:  severe persistent headache     Notify your health care provider if you experience any of the following:  worsening rash     Notify your health care provider if you experience any of the following:  persistent dizziness,  light-headedness, or visual disturbances     Notify your health care provider if you experience any of the following:  increased confusion or weakness     Notify your health care provider if you experience any of the following:     Type And Screen Preop   Standing Status: Future Standing Exp. Date: 06/23/21     Activity as tolerated     Weight bearing restrictions (specify):     Medications:  Reconciled Home Medications:      Medication List      START taking these medications    oxyCODONE 5 MG immediate release tablet  Commonly known as:  ROXICODONE  Take 1 tablet (5 mg total) by mouth every 4 (four) hours as needed for Pain.     polyethylene glycol 17 gram Pwpk  Commonly known as:  GLYCOLAX  Take 17 g by mouth once daily.     sulfamethoxazole-trimethoprim 400-80mg 400-80 mg per tablet  Commonly known as:  BACTRIM,SEPTRA  Take 1 tablet by mouth once daily. for 7 days        CHANGE how you take these medications    atorvastatin 20 MG tablet  Commonly known as:  LIPITOR  TAKE 1 TABLET ONCE DAILY  What changed:    · how much to take  · how to take this  · when to take this     fluticasone propionate 50 mcg/actuation nasal spray  Commonly known as:  FLONASE  USE 2 SPRAYS (=100 MCG     TOTAL) IN EACH NOSTRIL ONCEDAILY  What changed:  See the new instructions.        CONTINUE taking these medications    aspirin 81 MG EC tablet  Commonly known as:  ECOTRIN  Take 81 mg by mouth once daily.     coenzyme Q10 200 mg capsule     lisinopriL 10 MG tablet  Take 1 tablet (10 mg total) by mouth once daily.     magnesium gluconate 27.5 mg magne- sium (500 mg) Tab  Take by mouth once.     multivitamin per tablet  Commonly known as:  THERAGRAN  Take 1 tablet by mouth once daily.     naproxen sodium 220 MG tablet  Commonly known as:  ANAPROX  Take 220 mg by mouth 2 (two) times daily with meals. Takes 2 tabs twice daily     neomycin-polymyxin-dexamethasone 3.5 mg/g-10,000 unit/g-0.1 % Oint  Commonly known as:  DEXACINE  Use daily to  eyelids as needed     omeprazole 20 MG capsule  Commonly known as:  PRILOSEC  Take 1 capsule (20 mg total) by mouth once daily.     RIBOFLAVIN ORAL  Take 100 mg by mouth.     vitamin D 1000 units Tab  Commonly known as:  VITAMIN D3  Take 2,000 Units by mouth once daily.            Time spent on the discharge of patient: 25 minutes    Blanco Madison MD  Urology  [facility]

## 2020-04-29 NOTE — ANESTHESIA RELEASE NOTE
"Anesthesia Release from PACU Note    Patient: Ha Madrid    Procedure(s) Performed: Procedure(s) (LRB):  XI ROBOTIC PROSTATECTOMY (N/A)    Anesthesia type: GEN    Post pain: Adequate analgesia reported    Post assessment: no apparent anesthetic complications, tolerated procedure well and no evidence of recall    Post vital signs: BP (!) 147/86 (BP Location: Right arm, Patient Position: Lying)   Pulse 65   Temp 36.3 °C (97.3 °F) (Temporal)   Resp 12   Ht 6' 2" (1.88 m)   Wt 83.9 kg (185 lb)   SpO2 100%   BMI 23.75 kg/m²     Level of consciousness: awake, alert and oriented    Nausea/Vomiting: no nausea/no vomiting    Complications: none    Airway Patency: patent    Respiratory: unassisted, spontaneous ventilation, room air    Cardiovascular: stable and blood pressure at baseline    Hydration: euvolemic    "

## 2020-05-01 ENCOUNTER — PATIENT MESSAGE (OUTPATIENT)
Dept: ADMINISTRATIVE | Facility: OTHER | Age: 70
End: 2020-05-01

## 2020-05-03 ENCOUNTER — PATIENT MESSAGE (OUTPATIENT)
Dept: ADMINISTRATIVE | Facility: OTHER | Age: 70
End: 2020-05-03

## 2020-05-07 ENCOUNTER — PATIENT MESSAGE (OUTPATIENT)
Dept: ADMINISTRATIVE | Facility: OTHER | Age: 70
End: 2020-05-07

## 2020-05-07 ENCOUNTER — OFFICE VISIT (OUTPATIENT)
Dept: UROLOGY | Facility: CLINIC | Age: 70
End: 2020-05-07
Payer: MEDICARE

## 2020-05-07 VITALS
WEIGHT: 196 LBS | HEART RATE: 62 BPM | SYSTOLIC BLOOD PRESSURE: 121 MMHG | HEIGHT: 74 IN | DIASTOLIC BLOOD PRESSURE: 76 MMHG | BODY MASS INDEX: 25.15 KG/M2 | RESPIRATION RATE: 15 BRPM

## 2020-05-07 DIAGNOSIS — C61 PROSTATE CANCER: Primary | ICD-10-CM

## 2020-05-07 PROCEDURE — 99214 OFFICE O/P EST MOD 30 MIN: CPT | Mod: PBBFAC | Performed by: UROLOGY

## 2020-05-07 PROCEDURE — 99499 NO LOS: ICD-10-PCS | Mod: S$PBB,,, | Performed by: UROLOGY

## 2020-05-07 PROCEDURE — 99999 PR PBB SHADOW E&M-EST. PATIENT-LVL IV: ICD-10-PCS | Mod: PBBFAC,,, | Performed by: UROLOGY

## 2020-05-07 PROCEDURE — 99499 UNLISTED E&M SERVICE: CPT | Mod: S$PBB,,, | Performed by: UROLOGY

## 2020-05-07 PROCEDURE — 99999 PR PBB SHADOW E&M-EST. PATIENT-LVL IV: CPT | Mod: PBBFAC,,, | Performed by: UROLOGY

## 2020-05-07 RX ORDER — TADALAFIL 5 MG/1
5 TABLET ORAL DAILY PRN
Qty: 30 TABLET | Refills: 11 | Status: SHIPPED | OUTPATIENT
Start: 2020-05-07 | End: 2020-07-23

## 2020-05-07 NOTE — PROGRESS NOTES
Clinic Note  5/7/2020      Subjective:         Chief Complaint:   TERRA Madrid is a 69 y.o. male recently diagnosed with prostate cancer.   robotic assisted laparoscopic prostatectomy 4/29- path: [id number], Ruth 6.     PSA - 10.3  Stage - T1C  Volume - 58 ccs  MRI - significant artifact from LTHR. 1.5 cm lesion LBPZ. No obvious extra prostatic extension, seminal vesicle but difficult to assess due to artifact. Minimal median lobe.  Biopsy - Ruth 3+4 left apex 2/2 32%, left middle 2/2 52%, left base 3/3 46%  right base 1/2 11%, Target 1 6/6 80%.  ARYAN Score - 6 intermediate risk  NCCN - unfavorable intermediate      Lab Results   Component Value Date    PSA 6.4 (H) 12/18/2018    PSA 6.6 (H) 12/14/2016    PSA 5.4 (H) 12/15/2015    PSA 4.3 (H) 12/15/2014    PSA 3.3 12/11/2013    PSA 2.93 05/24/2012    PSA 2.7 07/11/2011    PSA 2.1 08/11/2010    PSA 2.4 09/22/2009    PSA 2.2 07/30/2009    PSADIAG 10.3 (H) 12/16/2019    PSADIAG 5.7 (H) 12/29/2017    PSADIAG 5.7 (H) 12/29/2017    PSADIAG 5.1 (H) 04/18/2016      Past Medical History:   Diagnosis Date    Allergy     Basal cell carcinoma, face     left mandible    GERD (gastroesophageal reflux disease)     Hypertension      Family History   Problem Relation Age of Onset    Cancer Mother     Colon cancer Sister     Melanoma Neg Hx      Social History     Socioeconomic History    Marital status:      Spouse name: Not on file    Number of children: Not on file    Years of education: Not on file    Highest education level: Not on file   Occupational History    Occupation: Physician   Social Needs    Financial resource strain: Not hard at all    Food insecurity:     Worry: Never true     Inability: Never true    Transportation needs:     Medical: No     Non-medical: No   Tobacco Use    Smoking status: Former Smoker     Packs/day: 0.50     Years: 15.00     Pack years: 7.50     Types: Cigarettes    Smokeless tobacco: Never Used   Substance  and Sexual Activity    Alcohol use: No     Frequency: Never    Drug use: No    Sexual activity: Yes   Lifestyle    Physical activity:     Days per week: 4 days     Minutes per session: 140 min    Stress: Not at all   Relationships    Social connections:     Talks on phone: Twice a week     Gets together: More than three times a week     Attends Bahai service: Not on file     Active member of club or organization: Yes     Attends meetings of clubs or organizations: Patient refused     Relationship status:    Other Topics Concern    Not on file   Social History Narrative    Not on file     Past Surgical History:   Procedure Laterality Date    COLONOSCOPY N/A 12/24/2018    Procedure: COLONOSCOPY;  Surgeon: NICKOLAS Umanzor MD;  Location: [facility] (4TH FLR);  Service: Endoscopy;  Laterality: N/A;  Dr. Pop patient however patient wants December (needs after 12/20) and wants Dr. Umanzor if  cannot do December/ pt okay with Dec 24th/ wants NO SEDATION - sm    excision of basal cell ca on face      JOINT REPLACEMENT      L Hip Replacement 2009    left hip      Prostate biospy      ROBOT-ASSISTED LAPAROSCOPIC PROSTATECTOMY USING DA TANYA XI N/A 4/29/2020    Procedure: XI ROBOTIC PROSTATECTOMY;  Surgeon: Mazin Orozco MD;  Location: [facility] OR [address] [place] [address];  Service: Urology;  Laterality: N/A;  3hrs gen with regional     THYROIDECTOMY, PARTIAL      TONSILLECTOMY      VASECTOMY       Patient Active Problem List   Diagnosis    Anemia    Sleep difficulties: aggravated by leg discomfort ( anterior thigh) Ambien with an Aleve    Hypertension    Hyperlipidemia    Abnormal CT lung screening: follow up pulmonary nodule    Thyroid condition: calcifications, 1979 thyroid lobectomy    Colon polyps:12/13 follow up in 5 years    Special screening for malignant neoplasms, colon    Personal history of colonic polyps    Basal cell carcinoma, face    Screening for colon cancer    Family  "history of colon cancer    Trigger ring finger of right hand    Prostate cancer     Review of Systems      Objective:      There were no vitals taken for this visit.  Estimated body mass index is 23.75 kg/m² as calculated from the following:    Height as of 4/29/20: 6' 2" (1.88 m).    Weight as of 4/29/20: 83.9 kg (185 lb).  Physical Exam   Abdominal:   Incisions healing well         Assessment and Plan:           Problem List Items Addressed This Visit     None          Follow up:   Voiding trial  1 month with PSA. Discussed activity recommendations and restrictions, resume Sil.    Mazin Orozco        "

## 2020-05-29 ENCOUNTER — PATIENT MESSAGE (OUTPATIENT)
Dept: ADMINISTRATIVE | Facility: OTHER | Age: 70
End: 2020-05-29

## 2020-06-05 ENCOUNTER — LAB VISIT (OUTPATIENT)
Dept: LAB | Facility: HOSPITAL | Age: 70
End: 2020-06-05
Attending: UROLOGY
Payer: MEDICARE

## 2020-06-05 DIAGNOSIS — C61 PROSTATE CANCER: ICD-10-CM

## 2020-06-05 PROCEDURE — 36415 COLL VENOUS BLD VENIPUNCTURE: CPT | Mod: PO

## 2020-06-05 PROCEDURE — 84153 ASSAY OF PSA TOTAL: CPT

## 2020-06-06 LAB — COMPLEXED PSA SERPL-MCNC: 0.02 NG/ML (ref 0–4)

## 2020-06-09 ENCOUNTER — OFFICE VISIT (OUTPATIENT)
Dept: UROLOGY | Facility: CLINIC | Age: 70
End: 2020-06-09
Payer: MEDICARE

## 2020-06-09 VITALS
HEART RATE: 82 BPM | DIASTOLIC BLOOD PRESSURE: 72 MMHG | HEIGHT: 74 IN | RESPIRATION RATE: 15 BRPM | SYSTOLIC BLOOD PRESSURE: 139 MMHG | WEIGHT: 196 LBS | BODY MASS INDEX: 25.15 KG/M2

## 2020-06-09 DIAGNOSIS — C61 PROSTATE CANCER: Primary | ICD-10-CM

## 2020-06-09 PROCEDURE — 99999 PR PBB SHADOW E&M-EST. PATIENT-LVL III: ICD-10-PCS | Mod: PBBFAC,,, | Performed by: UROLOGY

## 2020-06-09 PROCEDURE — 99999 PR PBB SHADOW E&M-EST. PATIENT-LVL III: CPT | Mod: PBBFAC,,, | Performed by: UROLOGY

## 2020-06-09 PROCEDURE — 99499 NO LOS: ICD-10-PCS | Mod: S$PBB,,, | Performed by: UROLOGY

## 2020-06-09 PROCEDURE — 99213 OFFICE O/P EST LOW 20 MIN: CPT | Mod: PBBFAC | Performed by: UROLOGY

## 2020-06-09 PROCEDURE — 99499 UNLISTED E&M SERVICE: CPT | Mod: S$PBB,,, | Performed by: UROLOGY

## 2020-06-09 NOTE — PROGRESS NOTES
Clinic Note  6/9/2020      Subjective:     Chief Complaint:   TERRA Madrid is a 69 y.o. male recently diagnosed with prostate cancer.   robotic assisted laparoscopic prostatectomy 4/29- path: [id number], Ruth 6.  Leaking mostly with standing from seated position. Occasional JOSHUA or night-time leakage. Just occasional pads at night due to low volume leakage. Doing Kegels.  Daily 5mg Cialis. Seeing Gisela next week.     PSA - 10.3  Stage - T1C  Volume - 58 ccs  MRI - significant artifact from LTHR. 1.5 cm lesion LBPZ. No obvious extra prostatic extension, seminal vesicle but difficult to assess due to artifact. Minimal median lobe.  Biopsy - [place] 3+4 left apex 2/2 32%, left middle 2/2 52%, left base 3/3 46%  right base 1/2 11%, Target 1 6/6 80%.  ARYAN Score - 6 intermediate risk  NCCN - unfavorable intermediate      Lab Results   Component Value Date    PSA 6.4 (H) 12/18/2018    PSA 6.6 (H) 12/14/2016    PSA 5.4 (H) 12/15/2015    PSA 4.3 (H) 12/15/2014    PSA 3.3 12/11/2013    PSA 2.93 05/24/2012    PSA 2.7 07/11/2011    PSA 2.1 08/11/2010    PSA 2.4 09/22/2009    PSA 2.2 07/30/2009    PSADIAG 0.02 06/05/2020    PSADIAG 10.3 (H) 12/16/2019    PSADIAG 5.7 (H) 12/29/2017    PSADIAG 5.7 (H) 12/29/2017    PSADIAG 5.1 (H) 04/18/2016      Past Medical History:   Diagnosis Date    Allergy     Basal cell carcinoma, face     left mandible    GERD (gastroesophageal reflux disease)     Hypertension      Family History   Problem Relation Age of Onset    Cancer Mother     Colon cancer Sister     Melanoma Neg Hx      Social History     Socioeconomic History    Marital status:      Spouse name: Not on file    Number of children: Not on file    Years of education: Not on file    Highest education level: Not on file   Occupational History    Occupation: Physician   Social Needs    Financial resource strain: Not hard at all    Food insecurity:     Worry: Never true     Inability: Never true     Transportation needs:     Medical: No     Non-medical: No   Tobacco Use    Smoking status: Former Smoker     Packs/day: 0.50     Years: 15.00     Pack years: 7.50     Types: Cigarettes    Smokeless tobacco: Never Used   Substance and Sexual Activity    Alcohol use: No     Frequency: Never    Drug use: No    Sexual activity: Yes   Lifestyle    Physical activity:     Days per week: 4 days     Minutes per session: 140 min    Stress: Not at all   Relationships    Social connections:     Talks on phone: Twice a week     Gets together: More than three times a week     Attends Scientology service: Not on file     Active member of club or organization: Yes     Attends meetings of clubs or organizations: Patient refused     Relationship status:    Other Topics Concern    Not on file   Social History Narrative    Not on file     Past Surgical History:   Procedure Laterality Date    COLONOSCOPY N/A 12/24/2018    Procedure: COLONOSCOPY;  Surgeon: NICKOLAS Umanzor MD;  Location: [facility] (4TH [facility]  Service: Endoscopy;  Laterality: N/A;  Dr. Pop patient however patient wants December (needs after 12/20) and wants Dr. Umanzor if  cannot do December/ pt okay with Dec 24th/ wants NO SEDATION - sm    excision of basal cell ca on face      JOINT REPLACEMENT      L Hip Replacement 2009    left hip      Prostate biospy      ROBOT-ASSISTED LAPAROSCOPIC PROSTATECTOMY USING DA TANYA XI N/A 4/29/2020    Procedure: XI ROBOTIC PROSTATECTOMY;  Surgeon: Mazin Orozco MD;  Location: [address];  Service: Urology;  Laterality: N/A;  3hrs gen with regional     THYROIDECTOMY, PARTIAL      TONSILLECTOMY      VASECTOMY       Patient Active Problem List   Diagnosis    Anemia    Sleep difficulties: aggravated by leg discomfort ( anterior thigh) Ambien with an Aleve    Hypertension    Hyperlipidemia    Abnormal CT lung screening: follow up pulmonary nodule    Thyroid condition: calcifications, 1979  "thyroid lobectomy    Colon polyps:12/13 follow up in 5 years    Special screening for malignant neoplasms, colon    Personal history of colonic polyps    Basal cell carcinoma, face    Screening for colon cancer    Family history of colon cancer    Trigger ring finger of right hand    Prostate cancer     Review of Systems   Constitutional: Negative for chills, fatigue and fever.   HENT: Negative for sore throat and trouble swallowing.    Respiratory: Negative for shortness of breath and wheezing.    Cardiovascular: Positive for leg swelling (right foot). Negative for chest pain and palpitations.   Gastrointestinal: Negative for abdominal pain, constipation, diarrhea, nausea and vomiting.   Genitourinary:        As per HPI   Musculoskeletal: Negative for arthralgias and back pain.   Skin: Negative for pallor and rash.   Neurological: Negative for seizures and syncope.   Psychiatric/Behavioral: Negative for hallucinations. The patient is not nervous/anxious.          Objective:      /72   Pulse 82   Resp 15   Ht 6' 2" (1.88 m)   Wt 88.9 kg (196 lb)   BMI 25.16 kg/m²   Estimated body mass index is 25.16 kg/m² as calculated from the following:    Height as of this encounter: 6' 2" (1.88 m).    Weight as of this encounter: 88.9 kg (196 lb).  Physical Exam   Nursing note and vitals reviewed.  Constitutional: He is oriented to person, place, and time. He appears well-developed and well-nourished. No distress.   Cardiovascular: Normal rate.    Pulmonary/Chest: Effort normal. No accessory muscle usage. No respiratory distress.   Abdominal: Soft. He exhibits no distension. There is no tenderness.   Incisions c/d/i   Musculoskeletal: He exhibits no edema.   Neurological: He is alert and oriented to person, place, and time.          Assessment and Plan:           Problem List Items Addressed This Visit        Oncology    Prostate cancer - Primary    Relevant Orders    Prostate Specific Antigen, Diagnostic    "       Follow up:     PSA in 6 months  Gisela next week.    Sai Zhao

## 2020-06-16 ENCOUNTER — TELEPHONE (OUTPATIENT)
Dept: UROLOGY | Facility: CLINIC | Age: 70
End: 2020-06-16

## 2020-07-23 ENCOUNTER — OFFICE VISIT (OUTPATIENT)
Dept: UROLOGY | Facility: CLINIC | Age: 70
End: 2020-07-23
Payer: MEDICARE

## 2020-07-23 VITALS
BODY MASS INDEX: 25.15 KG/M2 | DIASTOLIC BLOOD PRESSURE: 87 MMHG | HEART RATE: 59 BPM | SYSTOLIC BLOOD PRESSURE: 163 MMHG | WEIGHT: 196 LBS | HEIGHT: 74 IN

## 2020-07-23 DIAGNOSIS — I10 ESSENTIAL HYPERTENSION: ICD-10-CM

## 2020-07-23 DIAGNOSIS — N52.31 ERECTILE DYSFUNCTION FOLLOWING RADICAL PROSTATECTOMY: Primary | ICD-10-CM

## 2020-07-23 DIAGNOSIS — E78.5 HYPERLIPIDEMIA, UNSPECIFIED HYPERLIPIDEMIA TYPE: ICD-10-CM

## 2020-07-23 DIAGNOSIS — C61 PROSTATE CANCER: ICD-10-CM

## 2020-07-23 PROBLEM — Z12.11 SCREENING FOR COLON CANCER: Status: RESOLVED | Noted: 2018-12-24 | Resolved: 2020-07-23

## 2020-07-23 PROBLEM — Z80.0 FAMILY HISTORY OF COLON CANCER: Status: RESOLVED | Noted: 2019-12-19 | Resolved: 2020-07-23

## 2020-07-23 PROCEDURE — 99214 PR OFFICE/OUTPT VISIT, EST, LEVL IV, 30-39 MIN: ICD-10-PCS | Mod: S$PBB,24,, | Performed by: UROLOGY

## 2020-07-23 PROCEDURE — 99214 OFFICE O/P EST MOD 30 MIN: CPT | Mod: PBBFAC | Performed by: UROLOGY

## 2020-07-23 PROCEDURE — 99214 OFFICE O/P EST MOD 30 MIN: CPT | Mod: S$PBB,24,, | Performed by: UROLOGY

## 2020-07-23 PROCEDURE — 99999 PR PBB SHADOW E&M-EST. PATIENT-LVL IV: ICD-10-PCS | Mod: PBBFAC,,, | Performed by: UROLOGY

## 2020-07-23 PROCEDURE — 99999 PR PBB SHADOW E&M-EST. PATIENT-LVL IV: CPT | Mod: PBBFAC,,, | Performed by: UROLOGY

## 2020-07-23 RX ORDER — TADALAFIL 5 MG/1
5 TABLET ORAL DAILY
Qty: 90 TABLET | Refills: 3 | Status: SHIPPED | OUTPATIENT
Start: 2020-07-23 | End: 2021-07-14 | Stop reason: SDUPTHER

## 2020-07-23 NOTE — LETTER
July 23, 2020        Mazin Orozco MD  [address]  [place] [address]             [facility]  [address]  [place] [address]  Phone: [phone]   Patient: Ha Madrid   MR Number: [id number]   YOB: 1950   Date of Visit: 7/23/2020       Dear Dr. Orozco:    Thank you for referring Ha Madrid to me for evaluation. Attached you will find relevant portions of my assessment and plan of care.    If you have questions, please do not hesitate to call me. I look forward to following Ha Madrid along with you.    Sincerely,      Sai Higgins MD            CC  No Recipients    Enclosure

## 2020-07-23 NOTE — PROGRESS NOTES
/Mr. Madrid is a 70 y.o. male presenting for a consultation at the request of Dr. Orozco. Patient presents with ED.    PRESENTING ILLNESS:    Ha Madrid is a 70 y.o. male with prostate cancer s/p RALP on /4/29/20 by Dr. Orozco.  Since then, he c/o ED.  Prior to surgery, he did have ED, but it was mild.  Didn't require a PDE-5i.    He does have JOSHUA, but it's minimal.  He is wearing 0 pads/day.  No hematuria.  No dysuria.  Good FOS.    No bone pain or weight loss.    REVIEW OF SYSTEMS:    Ha Madrid denies headache, blurred vision, fever, nausea, vomiting, chills, abdominal pain, chest pain, sore throat, bleeding per rectum, cough, SOB, recent loss of consciousness, recent mental status changes, seizures, dizziness, or upper or lower extremity weakness.    ABBI  1. 1  2. 0  3. 0  4. 0  5. 0      PATIENT HISTORY:    Past Medical History:   Diagnosis Date    Allergy     Basal cell carcinoma, face     left mandible    Family history of colon cancer 12/19/2019    GERD (gastroesophageal reflux disease)     Hypertension        Family History   Problem Relation Age of Onset    Cancer Mother     Colon cancer Sister     Melanoma Neg Hx        Past Surgical History:   Procedure Laterality Date    COLONOSCOPY N/A 12/24/2018    Procedure: COLONOSCOPY;  Surgeon: NICKOLAS Umanzor MD;  Location: [facility] ([address] [place] [address]);  Service: Endoscopy;  Laterality: N/A;  Dr. Pop patient however patient wants December (needs after 12/20) and wants Dr. Umanzor if  cannot do December/ pt okay with Dec 24th/ wants NO SEDATION - sm    excision of basal cell ca on face      JOINT REPLACEMENT      L Hip Replacement 2009    left hip      Prostate biospy      ROBOT-ASSISTED LAPAROSCOPIC PROSTATECTOMY USING DA TANYA XI N/A 4/29/2020    Procedure: XI ROBOTIC PROSTATECTOMY;  Surgeon: Mazin Orozco MD;  Location: [address];  Service: Urology;  Laterality: N/A;  3hrs gen with regional     THYROIDECTOMY,  PARTIAL      TONSILLECTOMY      VASECTOMY         Social History     Socioeconomic History    Marital status:      Spouse name: Not on file    Number of children: Not on file    Years of education: Not on file    Highest education level: Not on file   Occupational History    Occupation: Physician   Social Needs    Financial resource strain: Not hard at all    Food insecurity     Worry: Never true     Inability: Never true    Transportation needs     Medical: No     Non-medical: No   Tobacco Use    Smoking status: Former Smoker     Packs/day: 0.50     Years: 15.00     Pack years: 7.50     Types: Cigarettes    Smokeless tobacco: Never Used   Substance and Sexual Activity    Alcohol use: No     Frequency: Never    Drug use: No    Sexual activity: Yes   Lifestyle    Physical activity     Days per week: 4 days     Minutes per session: 140 min    Stress: Not at all   Relationships    Social connections     Talks on phone: Twice a week     Gets together: More than three times a week     Attends [employer] service: Not on file     Active member of club or organization: Yes     Attends meetings of clubs or organizations: Patient refused     Relationship status:    Other Topics Concern    Not on file   Social History Narrative    Not on file       Review of patient's allergies indicates:   Allergen Reactions    No known allergies          Current Outpatient Medications:     aspirin (ECOTRIN) 81 MG EC tablet, Take 81 mg by mouth once daily. , Disp: , Rfl:     atorvastatin (LIPITOR) 20 MG tablet, TAKE 1 TABLET ONCE DAILY (Patient taking differently: every evening. ), Disp: 90 tablet, Rfl: 3    coenzyme Q10 200 mg capsule, , Disp: , Rfl:     fluticasone propionate (FLONASE) 50 mcg/actuation nasal spray, USE 2 SPRAYS (=100 MCG     TOTAL) IN EACH NOSTRIL ONCEDAILY, Disp: 16 g, Rfl: 2    lisinopriL 10 MG tablet, Take 1 tablet (10 mg total) by mouth once daily., Disp: 90 tablet, Rfl: 3     magnesium gluconate 27.5 mg magne- sium (500 mg) Tab, Take by mouth once., Disp: , Rfl:     multivitamin (THERAGRAN) per tablet, Take 1 tablet by mouth once daily., Disp: , Rfl:     naproxen sodium (ANAPROX) 220 MG tablet, Take 220 mg by mouth 2 (two) times daily with meals. Takes 2 tabs twice daily, Disp: , Rfl:     neomycin-polymyxin-dexamethasone (DEXACINE) 3.5-10,000-0.1 mg-unit/g-% Oint, Use daily to eyelids as needed (Patient not taking: Reported on 6/9/2020), Disp: 3.5 g, Rfl: 0    omeprazole (PRILOSEC) 20 MG capsule, Take 1 capsule (20 mg total) by mouth once daily., Disp: 90 capsule, Rfl: 3    oxyCODONE (ROXICODONE) 5 MG immediate release tablet, Take 1 tablet (5 mg total) by mouth every 4 (four) hours as needed for Pain. (Patient not taking: Reported on 5/7/2020), Disp: 10 tablet, Rfl: 0    polyethylene glycol (GLYCOLAX) 17 gram/dose powder, Mix 1 capful (17 g) with liquid and take by mouth once daily. (Patient not taking: Reported on 5/7/2020), Disp: 510 g, Rfl: 0    riboflavin, vitamin B2, (RIBOFLAVIN ORAL), Take 100 mg by mouth., Disp: , Rfl:     tadalafiL (CIALIS) 5 MG tablet, Take 1 tablet (5 mg total) by mouth daily as needed for Erectile Dysfunction., Disp: 30 tablet, Rfl: 11    vitamin D (VITAMIN D3) 1000 units Tab, Take 2,000 Units by mouth once daily., Disp: , Rfl:       PHYSICAL EXAMINATION:    The patient generally appears in good health, is appropriately interactive, and is in no apparent distress.     Eyes: anicteric sclerae, moist conjunctivae; no lid-lag; PERRLA     HENT: Atraumatic; oropharynx clear with moist mucous membranes and no mucosal ulcerations;normal hard and soft palate. No evidence of lymphadenopathy.    Neck: Trachea midline.  No thyromegaly.    Musculoskeletal: No abnormal gait.    Skin: No lesions.    Mental: Cooperative with normal affect.  Is oriented to time, place, and person.    Neuro: Grossly intact.    Chest: Normal inspiratory effort.   No accessory muscles.   No audible wheezes.  Respirations symmetric on inspiration and expiration.    Heart: Regular rhythm.      Abdomen:  Soft, non-tender. No masses or organomegaly. Bladder is not palpable. No evidence of flank discomfort. No evidence of inguinal hernia.    Genitourinary: The penis has no evidence of plaques or induration. The urethral meatus is normal. The testes, epididymides, and cord structures are normal in size and contour bilaterally. The scrotum is normal in size and contour.    Extremities: No clubbing, cyanosis, or edema        LABS:      Lab Results   Component Value Date    PSA 6.4 (H) 12/18/2018    PSA 6.6 (H) 12/14/2016    PSA 5.4 (H) 12/15/2015    PSADIAG 0.02 06/05/2020    PSADIAG 10.3 (H) 12/16/2019    PSADIAG 5.7 (H) 12/29/2017    PSADIAG 5.7 (H) 12/29/2017        IMPRESSION:    Encounter Diagnoses   Name Primary?    Erectile dysfunction following radical prostatectomy Yes    Prostate cancer     Essential hypertension     Hyperlipidemia, unspecified hyperlipidemia type        PLAN:    1. Discussed penile rehab today.  The risks, benefits, and the evidence was discussed with him today.  We discussed different options.  He would like to try Cialis.  Side effects discussed.  A new Rx was given.  2. RTC 3 months to re-evaluate.    Copy to: Pam

## 2020-09-29 ENCOUNTER — PATIENT MESSAGE (OUTPATIENT)
Dept: OTHER | Facility: OTHER | Age: 70
End: 2020-09-29

## 2020-10-13 ENCOUNTER — PATIENT MESSAGE (OUTPATIENT)
Dept: UROLOGY | Facility: CLINIC | Age: 70
End: 2020-10-13

## 2020-10-13 DIAGNOSIS — K40.90 INGUINAL HERNIA WITHOUT OBSTRUCTION OR GANGRENE, RECURRENCE NOT SPECIFIED, UNSPECIFIED LATERALITY: Primary | ICD-10-CM

## 2020-10-14 ENCOUNTER — PATIENT MESSAGE (OUTPATIENT)
Dept: UROLOGY | Facility: CLINIC | Age: 70
End: 2020-10-14

## 2020-10-26 ENCOUNTER — PATIENT OUTREACH (OUTPATIENT)
Dept: ADMINISTRATIVE | Facility: OTHER | Age: 70
End: 2020-10-26

## 2020-10-26 NOTE — PROGRESS NOTES
LINKS immunization registry not responding  Care Everywhere updated  Health Maintenance updated  Chart reviewed for overdue Proactive [facility] Encounters (EDMAR) health maintenance testing (CRS, Breast Ca, Diabetic Eye Exam)   Orders entered:N/A

## 2020-10-29 ENCOUNTER — OFFICE VISIT (OUTPATIENT)
Dept: SURGERY | Facility: CLINIC | Age: 70
End: 2020-10-29
Payer: MEDICARE

## 2020-10-29 VITALS
WEIGHT: 207.25 LBS | HEIGHT: 75 IN | SYSTOLIC BLOOD PRESSURE: 140 MMHG | DIASTOLIC BLOOD PRESSURE: 78 MMHG | HEART RATE: 61 BPM | BODY MASS INDEX: 25.77 KG/M2

## 2020-10-29 DIAGNOSIS — Z01.818 PREOP EXAMINATION: ICD-10-CM

## 2020-10-29 DIAGNOSIS — K40.20 BILATERAL INGUINAL HERNIA WITHOUT OBSTRUCTION OR GANGRENE, RECURRENCE NOT SPECIFIED: Primary | ICD-10-CM

## 2020-10-29 DIAGNOSIS — K40.90 INGUINAL HERNIA WITHOUT OBSTRUCTION OR GANGRENE, RECURRENCE NOT SPECIFIED, UNSPECIFIED LATERALITY: ICD-10-CM

## 2020-10-29 PROCEDURE — 99214 OFFICE O/P EST MOD 30 MIN: CPT | Mod: PBBFAC | Performed by: SURGERY

## 2020-10-29 PROCEDURE — 99999 PR PBB SHADOW E&M-EST. PATIENT-LVL IV: CPT | Mod: PBBFAC,,, | Performed by: SURGERY

## 2020-10-29 PROCEDURE — 99999 PR PBB SHADOW E&M-EST. PATIENT-LVL IV: ICD-10-PCS | Mod: PBBFAC,,, | Performed by: SURGERY

## 2020-10-29 PROCEDURE — 99203 PR OFFICE/OUTPT VISIT, NEW, LEVL III, 30-44 MIN: ICD-10-PCS | Mod: S$PBB,,, | Performed by: SURGERY

## 2020-10-29 PROCEDURE — 99203 OFFICE O/P NEW LOW 30 MIN: CPT | Mod: S$PBB,,, | Performed by: SURGERY

## 2020-10-29 NOTE — H&P (VIEW-ONLY)
History & Physical    SUBJECTIVE:     History of Present Illness:  Patient is a 70 y.o. male presents with bilateral inguinal hernias.  He noticed right groin pain with a lump after his prostatectomy.  The pain is getting a little worse and the hernia larger.  It is reducible which improves the pain.  He is lifting weights a few hours every other day (and does aerobic activity in between).      Chief Complaint   Patient presents with    Hernia     Right inguinal hernia - MRI showed bilateral       Review of patient's allergies indicates:   Allergen Reactions    No known allergies        Current Outpatient Medications   Medication Sig Dispense Refill    aspirin (ECOTRIN) 81 MG EC tablet Take 81 mg by mouth every morning.       atorvastatin (LIPITOR) 20 MG tablet Take 1 tablet (20 mg total) by mouth every evening. (Patient taking differently: Take 20 mg by mouth nightly. ) 90 tablet 3    coenzyme Q10 200 mg capsule Take 200 mg by mouth every morning.       fluticasone propionate (FLONASE) 50 mcg/actuation nasal spray USE 2 SPRAYS (=100 MCG     TOTAL) IN EACH NOSTRIL ONCEDAILY (Patient taking differently: 1 spray by Each Nostril route daily as needed. ) 16 g 2    lisinopriL 10 MG tablet Take 1 tablet (10 mg total) by mouth once daily. (Patient taking differently: Take 10 mg by mouth nightly. ) 90 tablet 3    magnesium gluconate 27.5 mg magne- sium (500 mg) Tab Take 1 tablet by mouth every morning.       multivitamin (THERAGRAN) per tablet Take 1 tablet by mouth every morning.       naproxen sodium (ANAPROX) 220 MG tablet Take 220 mg by mouth 2 (two) times daily with meals. Takes 2 tabs twice daily      neomycin-polymyxin-dexamethasone (DEXACINE) 3.5-10,000-0.1 mg-unit/g-% Oint Use daily to eyelids as needed 3.5 g 0    omeprazole (PRILOSEC) 20 MG capsule Take 1 capsule (20 mg total) by mouth once daily. (Patient taking differently: Take 20 mg by mouth nightly as needed. ) 90 capsule 3    riboflavin, vitamin  B2, (RIBOFLAVIN ORAL) Take 100 mg by mouth every morning.       tadalafiL (CIALIS) 5 MG tablet Take 1 tablet (5 mg total) by mouth once daily. 90 tablet 3    vitamin D (VITAMIN D3) 1000 units Tab Take 2,000 Units by mouth every morning.        No current facility-administered medications for this visit.        Past Medical History:   Diagnosis Date    Allergy     Basal cell carcinoma, face     left mandible    Family history of colon cancer 12/19/2019    GERD (gastroesophageal reflux disease)     Hypertension      Past Surgical History:   Procedure Laterality Date    COLONOSCOPY N/A 12/24/2018    Procedure: COLONOSCOPY;  Surgeon: NICKOLAS Umanzor MD;  Location: [facility] (4TH FLR);  Service: Endoscopy;  Laterality: N/A;  Dr. Pop patient however patient wants December (needs after 12/20) and wants Dr. Umanzor if  cannot do December/ pt okay with Dec 24th/ wants NO SEDATION - sm    excision of basal cell ca on face      JOINT REPLACEMENT      L Hip Replacement 2009    left hip      Prostate biospy      ROBOT-ASSISTED LAPAROSCOPIC PROSTATECTOMY USING DA TANYA XI N/A 4/29/2020    Procedure: XI ROBOTIC PROSTATECTOMY;  Surgeon: Mazin Orozco MD;  Location: [facility] OR 2ND FLR;  Service: Urology;  Laterality: N/A;  3hrs gen with regional     THYROIDECTOMY, PARTIAL      TONSILLECTOMY      VASECTOMY       Family History   Problem Relation Age of Onset    Cancer Mother     Colon cancer Sister     Melanoma Neg Hx      Social History     Tobacco Use    Smoking status: Former Smoker     Packs/day: 0.50     Years: 15.00     Pack years: 7.50     Types: Cigarettes    Smokeless tobacco: Never Used   Substance Use Topics    Alcohol use: No     Frequency: Never    Drug use: No        Review of Systems:  Review of Systems   Constitutional: Negative for fever and unexpected weight change.   Respiratory: Negative for cough, chest tightness and shortness of breath.    Cardiovascular: Negative for chest  "pain and palpitations.        Had some palpitations a while back and ekg showed pvcs   Gastrointestinal: Negative for constipation, diarrhea, nausea and vomiting.   Genitourinary: Negative for difficulty urinating.   Skin: Negative for rash and wound.   Neurological: Negative for seizures and headaches.   Hematological:        Has some easy bruising and bleeding but is taking nsaids daily       OBJECTIVE:     Vital Signs (Most Recent)  Pulse: 61 (10/29/20 1801)  BP: (!) 140/78 (10/29/20 1801)  6' 3" (1.905 m)  94 kg (207 lb 3.7 oz)     Physical Exam:  Physical Exam  Vitals signs reviewed.   Constitutional:       Appearance: Normal appearance.   Abdominal:      General: Abdomen is flat. Bowel sounds are normal.      Palpations: Abdomen is soft.      Hernia: A hernia is present. Hernia is present in the left inguinal area and right inguinal area.   Genitourinary:     Epididymis:      Right: Enlarged.      Left: Normal.       Skin:     General: Skin is warm and dry.   Neurological:      General: No focal deficit present.      Mental Status: He is alert and oriented to person, place, and time.   Psychiatric:         Mood and Affect: Mood normal.         Behavior: Behavior normal.         Thought Content: Thought content normal.         Judgment: Judgment normal.         Laboratory  CBC: Reviewed  CMP: Reviewed    Diagnostic Results:  MRI: Reviewed    ASSESSMENT/PLAN:     Bilateral inguinal hernias, symptomatic and growing on the right side.    PLAN:       For open bih with mesh.  Pcp clearance.           "

## 2020-10-29 NOTE — LETTER
[facility]  [address]  [facility] [address]  Phone: [phone] October 29, 2020      Mazin Orozco MD  [address]  [facility] [address]    Patient: Ha Madrid   MR Number: [id number]   YOB: 1950   Date of Visit: 10/29/2020     Dear Dr. Orozco:    Thank you for referring Ha Madrid to me for evaluation. Attached you will find relevant portions of my assessment and plan of care.    Mr. Madrid presents with bilateral inguinal hernias, symptomatic and growing on the right side.    We will plan for open bilateral inguinal hernia repair with mesh.  We will need PCP clearance.    If you have questions, please do not hesitate to call me. I look forward to following Ha Madrid along with you.    Sincerely,      Boston Gonzalez M.D.  Professor, [employer]  Section Head, General Surgery  [facility]    WSR/afw    CC  Brook Vasquez MD

## 2020-10-29 NOTE — Clinical Note
October 29, 2020      Mazin Orozco MD  [address]  [facility] [address]           [facility]  [address]  [facility] [address]  Phone: [phone]          Patient: Ha Madrid   MR Number: [id number]   YOB: 1950   Date of Visit: 10/29/2020       Dear Dr. Mazin Orozco:    Thank you for referring Ha Madrid to me for evaluation. Attached you will find relevant portions of my assessment and plan of care.    If you have questions, please do not hesitate to call me. I look forward to following Ha Madrid along with you.    Sincerely,    Boston Gonzalez MD    Enclosure  CC:  No Recipients    If you would like to receive this communication electronically, please contact [email or web address] or [phone] to request more information on [employer] Link access.    For providers and/or their staff who would like to refer a patient to [facility], please contact us through our one-stop-shop provider referral line, [facility], [place], at [phone].    If you feel you have received this communication in error or would no longer like to receive these types of communications, please e-mail [email or web address]

## 2020-10-29 NOTE — PROGRESS NOTES
History & Physical    SUBJECTIVE:     History of Present Illness:  Patient is a 70 y.o. male presents with bilateral inguinal hernias.  He noticed right groin pain with a lump after his prostatectomy.  The pain is getting a little worse and the hernia larger.  It is reducible which improves the pain.  He is lifting weights a few hours every other day (and does aerobic activity in between).      Chief Complaint   Patient presents with    Hernia     Right inguinal hernia - MRI showed bilateral       Review of patient's allergies indicates:   Allergen Reactions    No known allergies        Current Outpatient Medications   Medication Sig Dispense Refill    aspirin (ECOTRIN) 81 MG EC tablet Take 81 mg by mouth every morning.       atorvastatin (LIPITOR) 20 MG tablet Take 1 tablet (20 mg total) by mouth every evening. (Patient taking differently: Take 20 mg by mouth nightly. ) 90 tablet 3    coenzyme Q10 200 mg capsule Take 200 mg by mouth every morning.       fluticasone propionate (FLONASE) 50 mcg/actuation nasal spray USE 2 SPRAYS (=100 MCG     TOTAL) IN EACH NOSTRIL ONCEDAILY (Patient taking differently: 1 spray by Each Nostril route daily as needed. ) 16 g 2    lisinopriL 10 MG tablet Take 1 tablet (10 mg total) by mouth once daily. (Patient taking differently: Take 10 mg by mouth nightly. ) 90 tablet 3    magnesium gluconate 27.5 mg magne- sium (500 mg) Tab Take 1 tablet by mouth every morning.       multivitamin (THERAGRAN) per tablet Take 1 tablet by mouth every morning.       naproxen sodium (ANAPROX) 220 MG tablet Take 220 mg by mouth 2 (two) times daily with meals. Takes 2 tabs twice daily      neomycin-polymyxin-dexamethasone (DEXACINE) 3.5-10,000-0.1 mg-unit/g-% Oint Use daily to eyelids as needed 3.5 g 0    omeprazole (PRILOSEC) 20 MG capsule Take 1 capsule (20 mg total) by mouth once daily. (Patient taking differently: Take 20 mg by mouth nightly as needed. ) 90 capsule 3    riboflavin, vitamin  B2, (RIBOFLAVIN ORAL) Take 100 mg by mouth every morning.       tadalafiL (CIALIS) 5 MG tablet Take 1 tablet (5 mg total) by mouth once daily. 90 tablet 3    vitamin D (VITAMIN D3) 1000 units Tab Take 2,000 Units by mouth every morning.        No current facility-administered medications for this visit.        Past Medical History:   Diagnosis Date    Allergy     Basal cell carcinoma, face     left mandible    Family history of colon cancer 12/19/2019    GERD (gastroesophageal reflux disease)     Hypertension      Past Surgical History:   Procedure Laterality Date    COLONOSCOPY N/A 12/24/2018    Procedure: COLONOSCOPY;  Surgeon: NICKOLAS Umanzor MD;  Location: [facility] (4TH FLR);  Service: Endoscopy;  Laterality: N/A;  Dr. Pop patient however patient wants December (needs after 12/20) and wants Dr. Umanzor if  cannot do December/ pt okay with Dec 24th/ wants NO SEDATION - sm    excision of basal cell ca on face      JOINT REPLACEMENT      L Hip Replacement 2009    left hip      Prostate biospy      ROBOT-ASSISTED LAPAROSCOPIC PROSTATECTOMY USING DA TANYA XI N/A 4/29/2020    Procedure: XI ROBOTIC PROSTATECTOMY;  Surgeon: Mazin Orozco MD;  Location: [facility] OR 2ND FLR;  Service: Urology;  Laterality: N/A;  3hrs gen with regional     THYROIDECTOMY, PARTIAL      TONSILLECTOMY      VASECTOMY       Family History   Problem Relation Age of Onset    Cancer Mother     Colon cancer Sister     Melanoma Neg Hx      Social History     Tobacco Use    Smoking status: Former Smoker     Packs/day: 0.50     Years: 15.00     Pack years: 7.50     Types: Cigarettes    Smokeless tobacco: Never Used   Substance Use Topics    Alcohol use: No     Frequency: Never    Drug use: No        Review of Systems:  Review of Systems   Constitutional: Negative for fever and unexpected weight change.   Respiratory: Negative for cough, chest tightness and shortness of breath.    Cardiovascular: Negative for chest  "pain and palpitations.        Had some palpitations a while back and ekg showed pvcs   Gastrointestinal: Negative for constipation, diarrhea, nausea and vomiting.   Genitourinary: Negative for difficulty urinating.   Skin: Negative for rash and wound.   Neurological: Negative for seizures and headaches.   Hematological:        Has some easy bruising and bleeding but is taking nsaids daily       OBJECTIVE:     Vital Signs (Most Recent)  Pulse: 61 (10/29/20 1801)  BP: (!) 140/78 (10/29/20 1801)  6' 3" (1.905 m)  94 kg (207 lb 3.7 oz)     Physical Exam:  Physical Exam  Vitals signs reviewed.   Constitutional:       Appearance: Normal appearance.   Abdominal:      General: Abdomen is flat. Bowel sounds are normal.      Palpations: Abdomen is soft.      Hernia: A hernia is present. Hernia is present in the left inguinal area and right inguinal area.   Genitourinary:     Epididymis:      Right: Enlarged.      Left: Normal.       Skin:     General: Skin is warm and dry.   Neurological:      General: No focal deficit present.      Mental Status: He is alert and oriented to person, place, and time.   Psychiatric:         Mood and Affect: Mood normal.         Behavior: Behavior normal.         Thought Content: Thought content normal.         Judgment: Judgment normal.         Laboratory  CBC: Reviewed  CMP: Reviewed    Diagnostic Results:  MRI: Reviewed    ASSESSMENT/PLAN:     Bilateral inguinal hernias, symptomatic and growing on the right side.    PLAN:       For open bih with mesh.  Pcp clearance.           "

## 2020-11-02 ENCOUNTER — LAB VISIT (OUTPATIENT)
Dept: FAMILY MEDICINE | Facility: CLINIC | Age: 70
End: 2020-11-02
Payer: MEDICARE

## 2020-11-02 DIAGNOSIS — Z01.818 PREOP EXAMINATION: ICD-10-CM

## 2020-11-02 PROCEDURE — U0003 INFECTIOUS AGENT DETECTION BY NUCLEIC ACID (DNA OR RNA); SEVERE ACUTE RESPIRATORY SYNDROME CORONAVIRUS 2 (SARS-COV-2) (CORONAVIRUS DISEASE [COVID-19]), AMPLIFIED PROBE TECHNIQUE, MAKING USE OF HIGH THROUGHPUT TECHNOLOGIES AS DESCRIBED BY CMS-2020-01-R: HCPCS

## 2020-11-03 ENCOUNTER — TELEPHONE (OUTPATIENT)
Dept: SURGERY | Facility: CLINIC | Age: 70
End: 2020-11-03

## 2020-11-03 LAB — SARS-COV-2 RNA RESP QL NAA+PROBE: NOT DETECTED

## 2020-11-04 ENCOUNTER — ANESTHESIA (OUTPATIENT)
Dept: SURGERY | Facility: HOSPITAL | Age: 70
End: 2020-11-04
Payer: MEDICARE

## 2020-11-04 ENCOUNTER — ANESTHESIA EVENT (OUTPATIENT)
Dept: SURGERY | Facility: HOSPITAL | Age: 70
End: 2020-11-04
Payer: MEDICARE

## 2020-11-04 ENCOUNTER — HOSPITAL ENCOUNTER (OUTPATIENT)
Facility: HOSPITAL | Age: 70
Discharge: HOME | End: 2020-11-04
Attending: SURGERY | Admitting: SURGERY
Payer: MEDICARE

## 2020-11-04 VITALS
WEIGHT: 199 LBS | HEIGHT: 75 IN | BODY MASS INDEX: 24.74 KG/M2 | DIASTOLIC BLOOD PRESSURE: 60 MMHG | SYSTOLIC BLOOD PRESSURE: 126 MMHG | RESPIRATION RATE: 18 BRPM | HEART RATE: 54 BPM | TEMPERATURE: 97 F | OXYGEN SATURATION: 97 %

## 2020-11-04 DIAGNOSIS — K40.20 BILATERAL INGUINAL HERNIA WITHOUT OBSTRUCTION OR GANGRENE, RECURRENCE NOT SPECIFIED: ICD-10-CM

## 2020-11-04 DIAGNOSIS — K40.90 INGUINAL HERNIA: Primary | ICD-10-CM

## 2020-11-04 PROCEDURE — 88304 PR  SURG PATH,LEVEL III: ICD-10-PCS | Mod: 26,,, | Performed by: PATHOLOGY

## 2020-11-04 PROCEDURE — 37000009 HC ANESTHESIA EA ADD 15 MINS: Performed by: SURGERY

## 2020-11-04 PROCEDURE — 88304 TISSUE EXAM BY PATHOLOGIST: CPT | Performed by: PATHOLOGY

## 2020-11-04 PROCEDURE — 71000015 HC POSTOP RECOV 1ST HR: Performed by: SURGERY

## 2020-11-04 PROCEDURE — 25000003 PHARM REV CODE 250: Performed by: STUDENT IN AN ORGANIZED HEALTH CARE EDUCATION/TRAINING PROGRAM

## 2020-11-04 PROCEDURE — 63600175 PHARM REV CODE 636 W HCPCS: Performed by: STUDENT IN AN ORGANIZED HEALTH CARE EDUCATION/TRAINING PROGRAM

## 2020-11-04 PROCEDURE — 71000044 HC DOSC ROUTINE RECOVERY FIRST HOUR: Performed by: SURGERY

## 2020-11-04 PROCEDURE — 76942 QUADRATUS LUMBORUM SINGLE INJECTION BLOCK: ICD-10-PCS | Mod: 26,,, | Performed by: ANESTHESIOLOGY

## 2020-11-04 PROCEDURE — 25000003 PHARM REV CODE 250: Performed by: SURGERY

## 2020-11-04 PROCEDURE — 49505 PR REPAIR ING HERNIA,5+Y/O,REDUCIBL: ICD-10-PCS | Mod: 50,,, | Performed by: SURGERY

## 2020-11-04 PROCEDURE — 37000008 HC ANESTHESIA 1ST 15 MINUTES: Performed by: SURGERY

## 2020-11-04 PROCEDURE — 64450 QUADRATUS LUMBORUM SINGLE INJECTION BLOCK: ICD-10-PCS | Mod: 59,50,, | Performed by: ANESTHESIOLOGY

## 2020-11-04 PROCEDURE — 76942 ECHO GUIDE FOR BIOPSY: CPT | Mod: 26,,, | Performed by: ANESTHESIOLOGY

## 2020-11-04 PROCEDURE — 88304 TISSUE EXAM BY PATHOLOGIST: CPT | Mod: 26,,, | Performed by: PATHOLOGY

## 2020-11-04 PROCEDURE — 63600175 PHARM REV CODE 636 W HCPCS: Performed by: SURGERY

## 2020-11-04 PROCEDURE — 64450 NJX AA&/STRD OTHER PN/BRANCH: CPT | Mod: 59,50,, | Performed by: ANESTHESIOLOGY

## 2020-11-04 PROCEDURE — D9220A PRA ANESTHESIA: ICD-10-PCS | Mod: ,,, | Performed by: ANESTHESIOLOGY

## 2020-11-04 PROCEDURE — 49505 PRP I/HERN INIT REDUC >5 YR: CPT | Mod: 50,,, | Performed by: SURGERY

## 2020-11-04 PROCEDURE — C1781 MESH (IMPLANTABLE): HCPCS | Performed by: SURGERY

## 2020-11-04 PROCEDURE — 76942 ECHO GUIDE FOR BIOPSY: CPT | Performed by: STUDENT IN AN ORGANIZED HEALTH CARE EDUCATION/TRAINING PROGRAM

## 2020-11-04 PROCEDURE — 36000706: Performed by: SURGERY

## 2020-11-04 PROCEDURE — 27200750 HC INSULATED NEEDLE/ STIMUPLEX: Performed by: STUDENT IN AN ORGANIZED HEALTH CARE EDUCATION/TRAINING PROGRAM

## 2020-11-04 PROCEDURE — D9220A PRA ANESTHESIA: Mod: ,,, | Performed by: ANESTHESIOLOGY

## 2020-11-04 PROCEDURE — 36000707: Performed by: SURGERY

## 2020-11-04 PROCEDURE — 71000016 HC POSTOP RECOV ADDL HR: Performed by: SURGERY

## 2020-11-04 DEVICE — MESH PROLENE 3INX6IN 6/BX: Type: IMPLANTABLE DEVICE | Site: INGUINAL | Status: FUNCTIONAL

## 2020-11-04 RX ORDER — EPHEDRINE SULFATE/0.9% NACL/PF 25 MG/5 ML
SYRINGE (ML) INTRAVENOUS
Status: DISCONTINUED | OUTPATIENT
Start: 2020-11-04 | End: 2020-11-04

## 2020-11-04 RX ORDER — CEFAZOLIN SODIUM 1 G/3ML
2 INJECTION, POWDER, FOR SOLUTION INTRAMUSCULAR; INTRAVENOUS
Status: COMPLETED | OUTPATIENT
Start: 2020-11-04 | End: 2020-11-04

## 2020-11-04 RX ORDER — FENTANYL CITRATE 50 UG/ML
INJECTION, SOLUTION INTRAMUSCULAR; INTRAVENOUS
Status: DISCONTINUED | OUTPATIENT
Start: 2020-11-04 | End: 2020-11-04

## 2020-11-04 RX ORDER — LIDOCAINE HYDROCHLORIDE 10 MG/ML
1 INJECTION, SOLUTION EPIDURAL; INFILTRATION; INTRACAUDAL; PERINEURAL ONCE
Status: COMPLETED | OUTPATIENT
Start: 2020-11-04 | End: 2020-11-04

## 2020-11-04 RX ORDER — ONDANSETRON 2 MG/ML
INJECTION INTRAMUSCULAR; INTRAVENOUS
Status: DISCONTINUED | OUTPATIENT
Start: 2020-11-04 | End: 2020-11-04

## 2020-11-04 RX ORDER — SODIUM CHLORIDE 9 MG/ML
INJECTION, SOLUTION INTRAVENOUS CONTINUOUS
Status: DISCONTINUED | OUTPATIENT
Start: 2020-11-04 | End: 2020-11-04 | Stop reason: HOSPADM

## 2020-11-04 RX ORDER — HYDROCODONE BITARTRATE AND ACETAMINOPHEN 5; 325 MG/1; MG/1
1 TABLET ORAL EVERY 6 HOURS PRN
Status: DISCONTINUED | OUTPATIENT
Start: 2020-11-04 | End: 2020-11-04 | Stop reason: HOSPADM

## 2020-11-04 RX ORDER — SODIUM CHLORIDE 0.9 % (FLUSH) 0.9 %
10 SYRINGE (ML) INJECTION
Status: DISCONTINUED | OUTPATIENT
Start: 2020-11-04 | End: 2020-11-04 | Stop reason: HOSPADM

## 2020-11-04 RX ORDER — LIDOCAINE HCL/PF 100 MG/5ML
SYRINGE (ML) INTRAVENOUS
Status: DISCONTINUED | OUTPATIENT
Start: 2020-11-04 | End: 2020-11-04

## 2020-11-04 RX ORDER — BUPIVACAINE HYDROCHLORIDE 2.5 MG/ML
INJECTION, SOLUTION EPIDURAL; INFILTRATION; INTRACAUDAL
Status: DISCONTINUED | OUTPATIENT
Start: 2020-11-04 | End: 2020-11-04 | Stop reason: HOSPADM

## 2020-11-04 RX ORDER — VANCOMYCIN HYDROCHLORIDE 1 G/20ML
INJECTION, POWDER, LYOPHILIZED, FOR SOLUTION INTRAVENOUS
Status: DISCONTINUED | OUTPATIENT
Start: 2020-11-04 | End: 2020-11-04 | Stop reason: HOSPADM

## 2020-11-04 RX ORDER — TAMSULOSIN HYDROCHLORIDE 0.4 MG/1
0.4 CAPSULE ORAL DAILY
Status: DISCONTINUED | OUTPATIENT
Start: 2020-11-04 | End: 2020-11-04 | Stop reason: HOSPADM

## 2020-11-04 RX ORDER — FENTANYL CITRATE 50 UG/ML
25 INJECTION, SOLUTION INTRAMUSCULAR; INTRAVENOUS EVERY 5 MIN PRN
Status: DISCONTINUED | OUTPATIENT
Start: 2020-11-04 | End: 2020-11-04 | Stop reason: HOSPADM

## 2020-11-04 RX ORDER — MIDAZOLAM HYDROCHLORIDE 1 MG/ML
0.5 INJECTION INTRAMUSCULAR; INTRAVENOUS
Status: DISCONTINUED | OUTPATIENT
Start: 2020-11-04 | End: 2020-11-04 | Stop reason: HOSPADM

## 2020-11-04 RX ORDER — NEOSTIGMINE METHYLSULFATE 1 MG/ML
INJECTION, SOLUTION INTRAVENOUS
Status: DISCONTINUED | OUTPATIENT
Start: 2020-11-04 | End: 2020-11-04

## 2020-11-04 RX ORDER — DEXAMETHASONE SODIUM PHOSPHATE 4 MG/ML
INJECTION, SOLUTION INTRA-ARTICULAR; INTRALESIONAL; INTRAMUSCULAR; INTRAVENOUS; SOFT TISSUE
Status: DISCONTINUED | OUTPATIENT
Start: 2020-11-04 | End: 2020-11-04

## 2020-11-04 RX ORDER — PROPOFOL 10 MG/ML
VIAL (ML) INTRAVENOUS
Status: DISCONTINUED | OUTPATIENT
Start: 2020-11-04 | End: 2020-11-04

## 2020-11-04 RX ORDER — ACETAMINOPHEN 500 MG
1000 TABLET ORAL
Status: COMPLETED | OUTPATIENT
Start: 2020-11-04 | End: 2020-11-04

## 2020-11-04 RX ORDER — HYDROCODONE BITARTRATE AND ACETAMINOPHEN 5; 325 MG/1; MG/1
1 TABLET ORAL EVERY 6 HOURS PRN
Qty: 21 TABLET | Refills: 0 | Status: SHIPPED | OUTPATIENT
Start: 2020-11-04 | End: 2021-06-22

## 2020-11-04 RX ORDER — ROCURONIUM BROMIDE 10 MG/ML
INJECTION, SOLUTION INTRAVENOUS
Status: DISCONTINUED | OUTPATIENT
Start: 2020-11-04 | End: 2020-11-04

## 2020-11-04 RX ADMIN — ONDANSETRON 4 MG: 2 INJECTION, SOLUTION INTRAMUSCULAR; INTRAVENOUS at 09:11

## 2020-11-04 RX ADMIN — Medication 5 MG: at 08:11

## 2020-11-04 RX ADMIN — SODIUM CHLORIDE: 0.9 INJECTION, SOLUTION INTRAVENOUS at 07:11

## 2020-11-04 RX ADMIN — ROCURONIUM BROMIDE 40 MG: 10 INJECTION, SOLUTION INTRAVENOUS at 08:11

## 2020-11-04 RX ADMIN — FENTANYL CITRATE 50 MCG: 50 INJECTION, SOLUTION INTRAMUSCULAR; INTRAVENOUS at 08:11

## 2020-11-04 RX ADMIN — PROPOFOL 160 MG: 10 INJECTION, EMULSION INTRAVENOUS at 08:11

## 2020-11-04 RX ADMIN — LIDOCAINE HYDROCHLORIDE 10 MG: 10 INJECTION, SOLUTION EPIDURAL; INFILTRATION; INTRACAUDAL at 06:11

## 2020-11-04 RX ADMIN — DEXAMETHASONE SODIUM PHOSPHATE 4 MG: 4 INJECTION, SOLUTION INTRAMUSCULAR; INTRAVENOUS at 08:11

## 2020-11-04 RX ADMIN — CEFAZOLIN 2 G: 330 INJECTION, POWDER, FOR SOLUTION INTRAMUSCULAR; INTRAVENOUS at 08:11

## 2020-11-04 RX ADMIN — MIDAZOLAM 1 MG: 1 INJECTION INTRAMUSCULAR; INTRAVENOUS at 07:11

## 2020-11-04 RX ADMIN — ACETAMINOPHEN 1000 MG: 500 TABLET ORAL at 06:11

## 2020-11-04 RX ADMIN — TAMSULOSIN HYDROCHLORIDE 0.4 MG: 0.4 CAPSULE ORAL at 01:11

## 2020-11-04 RX ADMIN — NEOSTIGMINE METHYLSULFATE 5 MG: 1 INJECTION INTRAVENOUS at 09:11

## 2020-11-04 RX ADMIN — FENTANYL CITRATE 50 MCG: 50 INJECTION INTRAMUSCULAR; INTRAVENOUS at 07:11

## 2020-11-04 RX ADMIN — Medication 5 MG: at 09:11

## 2020-11-04 RX ADMIN — LIDOCAINE HYDROCHLORIDE 60 MG: 20 INJECTION, SOLUTION INTRAVENOUS at 08:11

## 2020-11-04 RX ADMIN — SODIUM CHLORIDE, SODIUM GLUCONATE, SODIUM ACETATE, POTASSIUM CHLORIDE, MAGNESIUM CHLORIDE, SODIUM PHOSPHATE, DIBASIC, AND POTASSIUM PHOSPHATE: .53; .5; .37; .037; .03; .012; .00082 INJECTION, SOLUTION INTRAVENOUS at 08:11

## 2020-11-04 RX ADMIN — Medication 10 MG: at 08:11

## 2020-11-04 RX ADMIN — HYDROCODONE BITARTRATE AND ACETAMINOPHEN 1 TABLET: 5; 325 TABLET ORAL at 10:11

## 2020-11-04 NOTE — PLAN OF CARE
Discharge instructions reviewed with pt at bedside and over the phone with wife. Understanding verbalized. complaints of pain relieved with PRN medication. Pt able to tolerate po intake and able to urinate in urinal prior to discharge. Pt straight cathed at 1300, 800ml of yellow urine drained. PRN medications given to help with urination. Pt able to void 400ml. MD Dominguez and Carlos to bedside to address POC and pt ability to void. Bedside delivery of medications occurred. To be transported to car by PCT.

## 2020-11-04 NOTE — INTERVAL H&P NOTE
The patient has been examined and the H&P has been reviewed:    I concur with the findings and no changes have occurred since H&P was written.    Surgery risks, benefits and alternative options discussed and understood by patient/family.          Active Hospital Problems    Diagnosis  POA    Inguinal hernia [K40.90]  Yes      Resolved Hospital Problems   No resolved problems to display.

## 2020-11-04 NOTE — BRIEF OP NOTE
[facility]  Brief Operative Note    Surgery Date: 11/4/2020     Surgeon(s) and Role:     * Boston Gonzalez MD - Primary     * Yeison Macias MD - Resident - Assisting        Pre-op Diagnosis:  Bilateral inguinal hernia without obstruction or gangrene, recurrence not specified [K40.20]    Post-op Diagnosis:  Post-Op Diagnosis Codes:     * Bilateral inguinal hernia without obstruction or gangrene, recurrence not specified [K40.20]    Procedure(s) (LRB):  REPAIR, HERNIA, INGUINAL, BILATERAL, Open with Mesh (Bilateral)    Anesthesia: General    Description of the findings of the procedure(s): bilateral inguinal hernia repairs, open. With mesh.     Estimated Blood Loss: minimal         Specimens:   Specimen (12h ago, onward)    None            Discharge Note    OUTCOME: Patient tolerated treatment/procedure well without complication and is now ready for discharge.    DISPOSITION: Home or Self Care    FINAL DIAGNOSIS:  Bilateral inguinal hernias.     FOLLOWUP: In clinic    DISCHARGE INSTRUCTIONS:    Discharge Procedure Orders   Diet Adult Regular     Notify your health care provider if you experience any of the following:  temperature >100.4     Notify your health care provider if you experience any of the following:  persistent nausea and vomiting or diarrhea     Notify your health care provider if you experience any of the following:  severe uncontrolled pain     Notify your health care provider if you experience any of the following:  redness, tenderness, or signs of infection (pain, swelling, redness, odor or green/yellow discharge around incision site)     Notify your health care provider if you experience any of the following:  difficulty breathing or increased cough     Notify your health care provider if you experience any of the following:  severe persistent headache     Notify your health care provider if you experience any of the following:  worsening rash     Notify your health care  provider if you experience any of the following:  persistent dizziness, light-headedness, or visual disturbances     Notify your health care provider if you experience any of the following:  increased confusion or weakness     Remove dressing in 24 hours   Order Comments: Over your wounds you have surgical glue and a dressing. Tomorrow, you can remove the dressing and shower normally. The surgical glue will fall off on its own in 1-2 weeks. Do not soak in bath tubs for 2 weeks.     Activity as tolerated   Order Comments: No lifting greater than 10 pounds for 6 weeks from day of surgery.  No pushing/pulling such as vacuuming or raking.  No straining, avoid constipation and take stool softeners as described and laxatives as needed.  No driving while on narcotics and until you can react quickly without pain.

## 2020-11-04 NOTE — ANESTHESIA PROCEDURE NOTES
Quadratus Lumborum Single Injection Block    Patient location during procedure: pre-op   Block not for primary anesthetic.  Reason for block: at surgeon's request and post-op pain management   Post-op Pain Location: bilateral abdominal pain  Start time: 11/4/2020 7:05 AM  Timeout: 11/4/2020 7:00 AM   End time: 11/4/2020 7:20 AM    Staffing  Authorizing Provider: Que Martinez MD  Performing Provider: Deon Ayers MD    Staffing  Other anesthesia staff: Peter Verdugo MD  Preanesthetic Checklist  Completed: patient identified, site marked, surgical consent, pre-op evaluation, timeout performed, IV checked, risks and benefits discussed and monitors and equipment checked  Peripheral Block  Patient position: supine  Prep: ChloraPrep  Patient monitoring: heart rate, cardiac monitor, continuous pulse ox, continuous capnometry and frequent blood pressure checks  Block type: Quadratus Lumborum  Laterality: bilateral  Injection technique: single shot  Needle  Needle type: Stimuplex   Needle gauge: 22 G  Needle length: 4 in  Needle localization: anatomical landmarks and ultrasound guidance   -ultrasound image captured on disc.  Assessment  Injection assessment: negative aspiration, negative parasthesia and local visualized surrounding nerve  Paresthesia pain: none  Heart rate change: no  Slow fractionated injection: yes  Additional Notes  VSS.  [facility] RN monitoring vitals throughout procedure.  Patient tolerated procedure well.    30 cc's of 0.375% bupiv w/ epi and additives administered under ultrasound guidance.

## 2020-11-04 NOTE — OP NOTE
DATE OF PROCEDURE: 11/4/2020    PRE OP DIAGNOSIS: Bilateral inguinal hernia without obstruction or gangrene, recurrence not specified [K40.20]    POST OP DIAGNOSIS: Bilateral inguinal hernia without obstruction or gangrene, recurrence not specified [K40.20]    PROCEDURE: Procedure(s) (LRB):  REPAIR, HERNIA, INGUINAL, BILATERAL, Open with Mesh (Bilateral)    Surgeon(s) and Role:     * Boston Gonzalez MD - Primary     * Yeison Macias MD - Resident - Assisting    ANESTHESIA: General.     Procedure:    The patient was placed under general anesthesia after a block plain anesthesia had been performed and prepped and draped in usual manner. Bilateral transverse incisions were made in the groin. This was 1st done with scalpel and subcutaneous tissues were divided with the Bovie down to the anterior rectus sheath which was cleared off bilaterally. Incision was made with scalpel laterally and this incision taken to the external ring using Metzenbaum scissors being careful of the ilioinguinal nerve which was preserved throughout the case on both sides. The cord was bluntly encircled and there were no direct hernias on either side. We dissected the spermatic cord on both sides and found moderate lipomas of the cord and indirect hernias. Right a little larger than left. The lipoma on the right side was resected and sent to pathology but the bilateral indirect hernias and left lipoma of cord were brought back into the preperitoneal space. Prolene mesh was cut to fit the defects placed in antibiotic solution and sewn to the pubic tubercle on both sides. We ran this along the ilioinguinal ligament on both sides and along the conjoined tendon on both sides tying these 0 Prolene sutures to themselves laterally. We sutured the a keyhole in the mesh around the spermatic cord until there was just appropriate room for the cord structures themselves. The area was inspected for hemostasis. The external oblique was reapproximated with a  2 0 Vicryl, Cortez's fascia was reapproximated with 3 0 Vicryl and the skin was reapproximated with 4 Vicryl subcuticular stitch. Dermabond was applied on the wounds. The patient tolerated procedure well was brought recovery room stable condition. Sponge and needle counts were correct at the end the case. Complications were none, pathology as right lipoma of the cord and blood loss was minimal.   This dictation was done with voice recognition software.

## 2020-11-04 NOTE — DISCHARGE INSTRUCTIONS
Discharge Instructions for Open Hernia Repair  You had a procedure called open hernia repair. Also called a rupture, a hernia is a tear or weakness in the wall of the belly. This weakness may be present at birth. Or it can be caused by the wear and tear of daily living. Hernias may get worse with time or with physical stress. But surgery can help repair the weakness and eliminate symptoms.  Activity after surgery  Recommendations include the following:  · After surgery, take it easy for the rest of the day. If you had general anesthesia, dont use machinery or power tools, drink alcohol, or make any major decisions for at least the first 24 hours.  · Dont drive while you are still taking opioid pain medicine, and dont drive until you are able to step firmly on the brake pedal without hesitation.  · Ask others to help with chores and errands while you recover.  · Dont lift anything heavier than 10 pounds until your healthcare provider says its OK.  · Dont mow the lawn, use a vacuum , or do other strenuous activities until your healthcare provider says its OK.  · Walk as often as you feel able.  · Continue the coughing and deep breathing exercises that you learned in the hospital.  · Ask your healthcare provider when you can expect to return to work.  · Avoid constipation:  ¨ Eat fruits, vegetables, and whole grains.  ¨ Drink 6 to 8 glasses of water a day, unless otherwise instructed.  ¨ Use a laxative or a mild stool softener as instructed by your healthcare provider.  Bandage and incision care  Tips include the following:  · Do not get the bandage or wound wet for 48 hours.  · If strips of tape were used to close your incision, dont pull them off. Let them fall off on their own.  · Remove any gauze bandage in 48 hours.  · Wash your incision with mild soap and water. Pat it dry. Dont use oils, powders, or lotions on your incision. Do not soak your incision or take tub baths until cleared by your  healthcare provider.  Follow-up care  Keep follow-up appointments during your recovery. These allow your healthcare provider to check your progress and make sure youre healing well. You may also need to have your stitches, staples, or bandage removed. During office visits, tell your healthcare provider if you have any new symptoms. And be sure to ask any questions you have.     When to call your healthcare provider  Call your healthcare provider immediately if you have any of the following:  · A large amount of swelling or bruising (some testicular swelling and bruising is common)  · Bleeding  · Increasing pain  · Increased redness or drainage of the incision  · Fever of 100.4°F (38.0°C) or higher, or as directed by your healthcare provider  · Trouble urinating  · Nausea or vomiting   Date Last Reviewed: 7/1/2016  © 4117-1644 [employer]. [address] [place] [address], [place] [address]. All rights reserved. This information is not intended as a substitute for professional medical care. Always follow your healthcare professional's instructions.

## 2020-11-04 NOTE — ANESTHESIA PREPROCEDURE EVALUATION
[facility]  Anesthesia Pre-Operative Evaluation         Patient Name: Ha Madrid  YOB: 1950  MRN: [id number]    SUBJECTIVE:     Pre-operative evaluation for Procedure(s) (LRB):  REPAIR, HERNIA, INGUINAL, BILATERAL, Open with Mesh (Bilateral)     11/04/2020    Ha Madrid is a 70 y.o. male w/ a significant PMHx of htn, hld, gerd,    Patient now presents for the above procedure(s).      LDA: None documented.       Peripheral IV - Single Lumen 04/29/20 0730 18 G Left Hand (Active)   Number of days: 188            Peripheral IV - Single Lumen 16 G Right Wrist (Active)   Number of days:             Urethral Catheter 04/29/20 0835 Non-latex;Straight-tip 16 Fr. (Active)   Number of days: 188       Prev airway:   Induction:  Intravenous    Intubated:  Postinduction    Mask Ventilation:  Easy mask    Attempts:  1    Attempted By:  Resident anesthesiologist    Method of Intubation:  Direct    Blade:  Ellsion 2    Laryngeal View Grade: Grade IIA - cords partially seen      Difficult Airway Encountered?: No      Complications:  None    Airway Device:  Oral endotracheal tube    Airway Device Size:  7.5    Style/Cuff Inflation:  Cuffed (inflated to minimal occlusive pressure)    Inflation Amount (mL):  8    Tube secured:  24    Secured at:  The teeth    Placement Verified By:  Capnometry    Complicating Factors:  Anterior larynx    Findings Post-Intubation:  BS equal bilateral    Drips: None documented.      Patient Active Problem List   Diagnosis    Anemia    Sleep difficulties: aggravated by leg discomfort ( anterior thigh) Ambien with an Aleve    Hypertension    Hyperlipidemia    Abnormal CT lung screening: follow up pulmonary nodule    Thyroid condition: calcifications, 1979 thyroid lobectomy    Colon polyps:12/13 follow up in 5 years    Special screening for malignant neoplasms, colon    Personal history of colonic polyps    Basal cell carcinoma, face     Trigger ring finger of right hand    Prostate cancer    Erectile dysfunction following radical prostatectomy    Bilateral inguinal hernia without obstruction or gangrene       Review of patient's allergies indicates:   Allergen Reactions    No known allergies        Current Inpatient Medications:      No current facility-administered medications on file prior to encounter.      Current Outpatient Medications on File Prior to Encounter   Medication Sig Dispense Refill    aspirin (ECOTRIN) 81 MG EC tablet Take 81 mg by mouth every morning.       atorvastatin (LIPITOR) 20 MG tablet Take 1 tablet (20 mg total) by mouth every evening. (Patient taking differently: Take 20 mg by mouth nightly. ) 90 tablet 3    coenzyme Q10 200 mg capsule Take 200 mg by mouth every morning.       fluticasone propionate (FLONASE) 50 mcg/actuation nasal spray USE 2 SPRAYS (=100 MCG     TOTAL) IN EACH NOSTRIL ONCEDAILY (Patient taking differently: 1 spray by Each Nostril route daily as needed. ) 16 g 2    lisinopriL 10 MG tablet Take 1 tablet (10 mg total) by mouth once daily. (Patient taking differently: Take 10 mg by mouth nightly. ) 90 tablet 3    magnesium gluconate 27.5 mg magne- sium (500 mg) Tab Take 1 tablet by mouth every morning.       multivitamin (THERAGRAN) per tablet Take 1 tablet by mouth every morning.       naproxen sodium (ANAPROX) 220 MG tablet Take 220 mg by mouth 2 (two) times daily with meals. Takes 2 tabs twice daily      neomycin-polymyxin-dexamethasone (DEXACINE) 3.5-10,000-0.1 mg-unit/g-% Oint Use daily to eyelids as needed 3.5 g 0    omeprazole (PRILOSEC) 20 MG capsule Take 1 capsule (20 mg total) by mouth once daily. (Patient taking differently: Take 20 mg by mouth nightly as needed. ) 90 capsule 3    riboflavin, vitamin B2, (RIBOFLAVIN ORAL) Take 100 mg by mouth every morning.       tadalafiL (CIALIS) 5 MG tablet Take 1 tablet (5 mg total) by mouth once daily. 90 tablet 3    vitamin D (VITAMIN  D3) 1000 units Tab Take 2,000 Units by mouth every morning.          Past Surgical History:   Procedure Laterality Date    COLONOSCOPY N/A 12/24/2018    Procedure: COLONOSCOPY;  Surgeon: NICKOLAS Umanzor MD;  Location: [facility] (4TH FLR);  Service: Endoscopy;  Laterality: N/A;  Dr. Pop patient however patient wants December (needs after 12/20) and wants Dr. Umanzor if  cannot do December/ pt okay with Dec 24th/ wants NO SEDATION - sm    excision of basal cell ca on face      JOINT REPLACEMENT      L Hip Replacement 2009    left hip      Prostate biospy      ROBOT-ASSISTED LAPAROSCOPIC PROSTATECTOMY USING DA TANYA XI N/A 4/29/2020    Procedure: XI ROBOTIC PROSTATECTOMY;  Surgeon: Mazin Orozco MD;  Location: [address];  Service: Urology;  Laterality: N/A;  3hrs gen with regional     THYROIDECTOMY, PARTIAL      TONSILLECTOMY      VASECTOMY         Social History     Socioeconomic History    Marital status:      Spouse name: Not on file    Number of children: Not on file    Years of education: Not on file    Highest education level: Not on file   Occupational History    Occupation: Physician   Social Needs    Financial resource strain: Not hard at all    Food insecurity     Worry: Never true     Inability: Never true    Transportation needs     Medical: No     Non-medical: No   Tobacco Use    Smoking status: Former Smoker     Packs/day: 0.50     Years: 15.00     Pack years: 7.50     Types: Cigarettes    Smokeless tobacco: Never Used   Substance and Sexual Activity    Alcohol use: No     Frequency: Never    Drug use: No    Sexual activity: Yes   Lifestyle    Physical activity     Days per week: 4 days     Minutes per session: 140 min    Stress: Not at all   Relationships    Social connections     Talks on phone: Twice a week     Gets together: More than three times a week     Attends Islam service: Not on file     Active member of club or organization: Yes      Attends meetings of clubs or organizations: Patient refused     Relationship status:    Other Topics Concern    Not on file   Social History Narrative    Not on file       OBJECTIVE:     Vital Signs Range (Last 24H):         Significant Labs:  Lab Results   Component Value Date    WBC 5.51 04/24/2020    HGB 13.3 (L) 04/24/2020    HCT 42.1 04/24/2020     (H) 04/24/2020    CHOL 144 12/16/2019    TRIG 50 12/16/2019    HDL 51 12/16/2019    ALT 37 04/24/2020    AST 40 04/24/2020     04/24/2020    K 4.4 04/29/2020     04/24/2020    CREATININE 1.2 04/24/2020    BUN 28 (H) 04/24/2020    CO2 25 04/24/2020    TSH 1.703 12/14/2016    PSA 6.4 (H) 12/18/2018    INR 1.8 (H) 10/13/2009       Diagnostic Studies: No relevant studies.    EKG:   Results for orders placed or performed during the hospital encounter of 12/19/19   EKG 12-lead    Collection Time: 12/19/19 12:59 PM    Narrative    Test Reason : I10,    Vent. Rate : 052 BPM     Atrial Rate : 052 BPM     P-R Int : 114 ms          QRS Dur : 088 ms      QT Int : 418 ms       P-R-T Axes : 024 019 042 degrees     QTc Int : 388 ms    Sinus bradycardia with occasional Premature ventricular complexes  Otherwise normal ECG  When compared with ECG of 13-DEC-2017 15:33,  Premature ventricular complexes are now Present  Premature atrial complexes are no longer Present  Confirmed by ELVA EDWARDS MD ([id number]) on 12/19/2019 1:47:13 PM    Referred By: JODEE MARY           Confirmed By:ELVA EDWARDS MD       2D ECHO:  TTE:  No results found for this or any previous visit.    LENNOX:  No results found for this or any previous visit.    ASSESSMENT/PLAN:       Anesthesia Evaluation    I have reviewed the Patient Summary Reports.      I have reviewed the Medications.     Review of Systems  Anesthesia Hx:  No problems with previous Anesthesia  History of prior surgery of interest to airway management or planning:  Denies Personal Hx of Anesthesia complications.   Social:  Former  Smoker    Hematology/Oncology:  Hematology Normal      Current/Recent Cancer. Oncology Comments: Also basal cell cured by excision    EENT/Dental:EENT/Dental Normal   Cardiovascular:   Hypertension, well controlled Denies MI.  Denies CAD.    Denies CABG/stent.   Denies Angina. hyperlipidemia Would ride exercycle qod for 30 minutes, 8.5 miles without difficutlly.  Stopped 4 months ago due to loss of access to gym, not due to symptoms.  Currently renovating house, moving walls, hanging and floating sheetrock without limitation.   Pulmonary:  Pulmonary Normal  Denies COPD.  Denies Asthma.  Denies Shortness of breath.    Renal/:   Denies Chronic Renal Disease.  Prostate cancer   Hepatic/GI:   GERD Denies Liver Disease.    Neurological:  Neurology Normal Denies TIA.  Denies CVA. Denies Seizures. bilat carpal tunnel and right ulnar neuropathy.   Endocrine:  Endocrine Normal Denies Diabetes.        Physical Exam  General:  Well nourished    Airway/Jaw/Neck:  Airway Findings: Mouth Opening: Normal Tongue: Normal  General Airway Assessment: Adult, Average  Mallampati: II  TM Distance: Normal, at least 6 cm  Jaw/Neck Findings:  Neck ROM: Normal ROM            Mental Status:  Mental Status Findings:  Cooperative, Alert and Oriented         Anesthesia Plan  Type of Anesthesia, risks & benefits discussed:  Anesthesia Type:  general  Patient's Preference:   Intra-op Monitoring Plan: standard ASA monitors  Intra-op Monitoring Plan Comments:   Post Op Pain Control Plan: multimodal analgesia, IV/PO Opioids PRN and per primary service following discharge from PACU  Post Op Pain Control Plan Comments:   Induction:   IV  Beta Blocker:  Patient is not currently on a Beta-Blocker (No further documentation required).       Informed Consent: Patient understands risks and agrees with Anesthesia plan.  Questions answered. Anesthesia consent signed with patient.  ASA Score: 2     Day of Surgery Review of History & Physical: I have  interviewed and examined the patient. I have reviewed the patient's H&P dated:    H&P update referred to the surgeon.         Ready For Surgery From Anesthesia Perspective.

## 2020-11-04 NOTE — ANESTHESIA PROCEDURE NOTES
Intubation  Performed by: Fabiano Calabrese MD  Authorized by: Raoul Arriaga MD     Intubation:     Induction:  Intravenous    Intubated:  Postinduction    Mask Ventilation:  Easy with oral airway    Attempts:  1    Attempted By:  Resident anesthesiologist    Method of Intubation:  Video laryngoscopy    Blade:  Petersen 3    Laryngeal View Grade: Grade I - full view of chords      Difficult Airway Encountered?: No      Complications:  None    Airway Device Size:  7.5    Style/Cuff Inflation:  Cuffed    Inflation Amount (mL):  8    Tube secured:  24    Secured at:  The lips    Placement Verified By:  Capnometry    Complicating Factors:  None    Findings Post-Intubation:  BS equal bilateral  Notes:      Patient complained of a sore throat after previous intubations. Decided to use petersen

## 2020-11-04 NOTE — TRANSFER OF CARE
"Anesthesia Transfer of Care Note    Patient: Ha Madrid    Procedure(s) Performed: Procedure(s) (LRB):  REPAIR, HERNIA, INGUINAL, BILATERAL, Open with Mesh (Bilateral)    Patient location: PACU    Anesthesia Type: general    Transport from OR: Transported from OR on 6-10 L/min O2 by face mask with adequate spontaneous ventilation    Post pain: adequate analgesia    Post assessment: no apparent anesthetic complications    Post vital signs: stable    Level of consciousness: lethargic and responds to stimulation    Nausea/Vomiting: no nausea/vomiting    Complications: none    Transfer of care protocol was followed      Last vitals:   Visit Vitals  BP (!) 100/57   Pulse (!) 47   Temp 36.8 °C (98.2 °F)   Resp 16   Ht 6' 3" (1.905 m)   Wt 90.3 kg (199 lb)   SpO2 97%   BMI 24.87 kg/m²     "

## 2020-11-04 NOTE — PROGRESS NOTES
"Pt complaints of feeling "distended" and "ready to urinate". MD Macias and Dominguez notified. Pt able to ambulate around unit and tolerate po intake. "no pain, just needing to pee" frequently reported by PT. VSS. Awaiting new orders.  "

## 2020-11-04 NOTE — BRIEF OP NOTE
Operative Note       Surgery Date: 11/4/2020     Surgeon(s) and Role:     * Boston Gonzalez MD - Primary     * Yeison Macias MD - Resident - Assisting    Pre-op Diagnosis:  Bilateral inguinal hernia without obstruction or gangrene, recurrence not specified [K40.20]    Post-op Diagnosis:  Bilateral inguinal hernia without obstruction or gangrene, recurrence not specified [K40.20]    Procedure(s) (LRB):  REPAIR, HERNIA, INGUINAL, BILATERAL, Open with Mesh (Bilateral)    Anesthesia: General    Procedure in Detail/Findings:  Moderate hernias on both sides-indirect hernias and lipomas of cord    Estimated Blood Loss: Minimal           Specimens (From admission, onward)     Start     Ordered    11/04/20 0833  Specimen to Pathology, Surgery General Surgery  Once     Question:  Procedure Type:  Answer:  General Surgery    11/04/20 0978              Implants:   Implant Name Type Inv. Item Serial No.  Lot No. LRB No. Used Action   MESH PROLENE [id number] 6/BX - [id number]  MESH PROLENE [id number] 6/BX  [employer] [id number] Left 1 Implanted   MESH PROLENE [id number] 6/BX - [id number]  MESH PROLENE [id number] 6/BX  [employer] [id number] Right 1 Implanted              Disposition: PACU - hemodynamically stable.           Condition: Good    Attestation:  I was present and scrubbed for the entire procedure.

## 2020-11-05 ENCOUNTER — PATIENT MESSAGE (OUTPATIENT)
Dept: ADMINISTRATIVE | Facility: OTHER | Age: 70
End: 2020-11-05

## 2020-11-06 ENCOUNTER — PATIENT MESSAGE (OUTPATIENT)
Dept: ADMINISTRATIVE | Facility: OTHER | Age: 70
End: 2020-11-06

## 2020-11-06 NOTE — ANESTHESIA POSTPROCEDURE EVALUATION
Anesthesia Post Evaluation    Patient: Ha Madrid    Procedure(s) Performed: Procedure(s) (LRB):  REPAIR, HERNIA, INGUINAL, BILATERAL, Open with Mesh (Bilateral)    Final Anesthesia Type: general    Patient location during evaluation: PACU  Patient participation: Yes- Able to Participate  Level of consciousness: awake and alert  Post-procedure vital signs: reviewed and stable  Pain management: adequate  Airway patency: patent    PONV status at discharge: No PONV  Anesthetic complications: no      Cardiovascular status: blood pressure returned to baseline  Respiratory status: unassisted  Hydration status: euvolemic  Follow-up not needed.          Vitals Value Taken Time   /60 11/04/20 1427   Temp 36.3 °C (97.3 °F) 11/04/20 1426   Pulse 46 11/04/20 1428   Resp 18 11/04/20 1426   SpO2 100 % 11/04/20 1428   Vitals shown include unvalidated device data.      No case tracking events are documented in the log.      Pain/Aguila Score: No data recorded

## 2020-11-07 ENCOUNTER — PATIENT MESSAGE (OUTPATIENT)
Dept: ADMINISTRATIVE | Facility: OTHER | Age: 70
End: 2020-11-07

## 2020-11-10 LAB
FINAL PATHOLOGIC DIAGNOSIS: NORMAL
Lab: NORMAL

## 2020-11-11 ENCOUNTER — PATIENT MESSAGE (OUTPATIENT)
Dept: ADMINISTRATIVE | Facility: OTHER | Age: 70
End: 2020-11-11

## 2020-11-19 ENCOUNTER — OFFICE VISIT (OUTPATIENT)
Dept: SURGERY | Facility: CLINIC | Age: 70
End: 2020-11-19
Payer: MEDICARE

## 2020-11-19 ENCOUNTER — OFFICE VISIT (OUTPATIENT)
Dept: UROLOGY | Facility: CLINIC | Age: 70
End: 2020-11-19
Payer: MEDICARE

## 2020-11-19 VITALS
HEIGHT: 75 IN | HEART RATE: 49 BPM | WEIGHT: 206.69 LBS | SYSTOLIC BLOOD PRESSURE: 132 MMHG | BODY MASS INDEX: 25.7 KG/M2 | DIASTOLIC BLOOD PRESSURE: 76 MMHG

## 2020-11-19 VITALS
HEIGHT: 75 IN | DIASTOLIC BLOOD PRESSURE: 88 MMHG | BODY MASS INDEX: 25.64 KG/M2 | WEIGHT: 206.25 LBS | SYSTOLIC BLOOD PRESSURE: 146 MMHG | HEART RATE: 53 BPM

## 2020-11-19 DIAGNOSIS — E78.5 HYPERLIPIDEMIA, UNSPECIFIED HYPERLIPIDEMIA TYPE: ICD-10-CM

## 2020-11-19 DIAGNOSIS — C61 PROSTATE CANCER: ICD-10-CM

## 2020-11-19 DIAGNOSIS — Z09 POSTOP CHECK: Primary | ICD-10-CM

## 2020-11-19 DIAGNOSIS — I10 ESSENTIAL HYPERTENSION: ICD-10-CM

## 2020-11-19 DIAGNOSIS — N52.31 ERECTILE DYSFUNCTION FOLLOWING RADICAL PROSTATECTOMY: Primary | ICD-10-CM

## 2020-11-19 PROBLEM — K40.20 BILATERAL INGUINAL HERNIA WITHOUT OBSTRUCTION OR GANGRENE: Status: RESOLVED | Noted: 2020-10-29 | Resolved: 2020-11-19

## 2020-11-19 PROBLEM — K40.90 INGUINAL HERNIA: Status: RESOLVED | Noted: 2020-11-04 | Resolved: 2020-11-19

## 2020-11-19 PROCEDURE — 99024 POSTOP FOLLOW-UP VISIT: CPT | Mod: POP,,, | Performed by: SURGERY

## 2020-11-19 PROCEDURE — 99213 OFFICE O/P EST LOW 20 MIN: CPT | Mod: PBBFAC,27 | Performed by: SURGERY

## 2020-11-19 PROCEDURE — 99214 OFFICE O/P EST MOD 30 MIN: CPT | Mod: S$PBB,,, | Performed by: UROLOGY

## 2020-11-19 PROCEDURE — 99999 PR PBB SHADOW E&M-EST. PATIENT-LVL III: ICD-10-PCS | Mod: PBBFAC,,, | Performed by: SURGERY

## 2020-11-19 PROCEDURE — 99214 OFFICE O/P EST MOD 30 MIN: CPT | Mod: PBBFAC | Performed by: UROLOGY

## 2020-11-19 PROCEDURE — 99024 PR POST-OP FOLLOW-UP VISIT: ICD-10-PCS | Mod: POP,,, | Performed by: SURGERY

## 2020-11-19 PROCEDURE — 99999 PR PBB SHADOW E&M-EST. PATIENT-LVL IV: ICD-10-PCS | Mod: PBBFAC,,, | Performed by: UROLOGY

## 2020-11-19 PROCEDURE — 99999 PR PBB SHADOW E&M-EST. PATIENT-LVL III: CPT | Mod: PBBFAC,,, | Performed by: SURGERY

## 2020-11-19 PROCEDURE — 99999 PR PBB SHADOW E&M-EST. PATIENT-LVL IV: CPT | Mod: PBBFAC,,, | Performed by: UROLOGY

## 2020-11-19 PROCEDURE — 99214 PR OFFICE/OUTPT VISIT, EST, LEVL IV, 30-39 MIN: ICD-10-PCS | Mod: S$PBB,,, | Performed by: UROLOGY

## 2020-11-19 RX ORDER — A/SINGAPORE/GP1908/2015 IVR-180 (AN A/MICHIGAN/45/2015 (H1N1)PDM09-LIKE VIRUS, A/HONG KONG/4801/2014, NYMC X-263B (H3N2) (AN A/HONG KONG/4801/2014-LIKE VIRUS), AND B/BRISBANE/60/2008, WILD TYPE (A B/BRISBANE/60/2008-LIKE VIRUS) 15; 15; 15 UG/.5ML; UG/.5ML; UG/.5ML
INJECTION, SUSPENSION INTRAMUSCULAR
COMMUNITY
Start: 2020-10-14 | End: 2021-11-01 | Stop reason: SDUPTHER

## 2020-11-19 NOTE — PROGRESS NOTES
History & Physical  General Surgery Clinic    SUBJECTIVE:     Chief complaint: post-operative follow up visit    History of Present Illness:  Jose Luis Madrid is a 70 y.o. male with a history of bilateral inguinal hernias who presents for a post-operative visit s/p bilateral open inguinal hernia repair with mesh placement. The patient states that he has been doing well post-operative. He describes occasional tenderness deep to the incisions and at the pubic tubercle. He states that he has returned to his normal activities of daily living. He is interested in returning to his exercises habits. The patient was counseled on participating in cardio workouts as tolerated but to hold off on lifting any weights greater than 10lbs. He endorses return of appetite, normal bowel movement and adequate pain control. He has no other complaints at this time.    Past Medical History:  Past Medical History:   Diagnosis Date    Allergy     Basal cell carcinoma, face     left mandible    Family history of colon cancer 12/19/2019    GERD (gastroesophageal reflux disease)     Hypertension     Personal history of colonic polyps 12/20/2013    Prostate cancer        Past Surgical History:  Past Surgical History:   Procedure Laterality Date    BILATERAL INGUINAL HERNIA REPAIR Bilateral 11/4/2020    Procedure: REPAIR, HERNIA, INGUINAL, BILATERAL, Open with Mesh;  Surgeon: Boston Gonzalez MD;  Location: [facility] OR [address] [place] [address];  Service: General;  Laterality: Bilateral;    COLONOSCOPY N/A 12/24/2018    Procedure: COLONOSCOPY;  Surgeon: NICKOLAS Umanzor MD;  Location: [facility] (4TH FLR);  Service: Endoscopy;  Laterality: N/A;  Dr. Pop patient however patient wants December (needs after 12/20) and wants Dr. Umanzor if  cannot do December/ pt okay with Dec 24th/ wants NO SEDATION - sm    excision of basal cell ca on face      JOINT REPLACEMENT      L Hip Replacement 2009    left hip      Prostate biospy       ROBOT-ASSISTED LAPAROSCOPIC PROSTATECTOMY USING DA TANYA XI N/A 4/29/2020    Procedure: XI ROBOTIC PROSTATECTOMY;  Surgeon: Mazin Orozco MD;  Location: [facility] OR [address] [place] [address];  Service: Urology;  Laterality: N/A;  3hrs gen with regional     THYROIDECTOMY, PARTIAL      TONSILLECTOMY      VASECTOMY         Family History:  Family History   Problem Relation Age of Onset    Cancer Mother     Colon cancer Sister     Melanoma Neg Hx        Social History:  Social History     Socioeconomic History    Marital status:      Spouse name: Not on file    Number of children: Not on file    Years of education: Not on file    Highest education level: Not on file   Occupational History    Occupation: Physician   Social Needs    Financial resource strain: Not hard at all    Food insecurity     Worry: Never true     Inability: Never true    Transportation needs     Medical: No     Non-medical: No   Tobacco Use    Smoking status: Former Smoker     Packs/day: 0.50     Years: 15.00     Pack years: 7.50     Types: Cigarettes    Smokeless tobacco: Never Used   Substance and Sexual Activity    Alcohol use: No     Frequency: Never    Drug use: No    Sexual activity: Yes   Lifestyle    Physical activity     Days per week: 4 days     Minutes per session: 140 min    Stress: Not at all   Relationships    Social connections     Talks on phone: Twice a week     Gets together: More than three times a week     Attends Synagogue service: Not on file     Active member of club or organization: Yes     Attends meetings of clubs or organizations: Patient refused     Relationship status:    Other Topics Concern    Not on file   Social History Narrative    Not on file       Medications:  Current Outpatient Medications   Medication Sig Dispense Refill    aspirin (ECOTRIN) 81 MG EC tablet Take 81 mg by mouth every morning.       atorvastatin (LIPITOR) 20 MG tablet Take 1 tablet (20 mg total) by mouth every evening.  "(Patient taking differently: Take 20 mg by mouth nightly. ) 90 tablet 3    coenzyme Q10 200 mg capsule Take 200 mg by mouth every morning.       fluticasone propionate (FLONASE) 50 mcg/actuation nasal spray USE 2 SPRAYS (=100 MCG     TOTAL) IN EACH NOSTRIL ONCEDAILY (Patient taking differently: 1 spray by Each Nostril route daily as needed. ) 16 g 2    lisinopriL 10 MG tablet Take 1 tablet (10 mg total) by mouth once daily. (Patient taking differently: Take 10 mg by mouth nightly. ) 90 tablet 3    magnesium gluconate 27.5 mg magne- sium (500 mg) Tab Take 1 tablet by mouth every morning.       multivitamin (THERAGRAN) per tablet Take 1 tablet by mouth every morning.       naproxen sodium (ANAPROX) 220 MG tablet Take 220 mg by mouth 2 (two) times daily with meals. Takes 2 tabs twice daily      neomycin-polymyxin-dexamethasone (DEXACINE) 3.5-10,000-0.1 mg-unit/g-% Oint Use daily to eyelids as needed 3.5 g 0    omeprazole (PRILOSEC) 20 MG capsule Take 1 capsule (20 mg total) by mouth once daily. (Patient taking differently: Take 20 mg by mouth nightly as needed. ) 90 capsule 3    riboflavin, vitamin B2, (RIBOFLAVIN ORAL) Take 100 mg by mouth every morning.       tadalafiL (CIALIS) 5 MG tablet Take 1 tablet (5 mg total) by mouth once daily. 90 tablet 3    vitamin D (VITAMIN D3) 1000 units Tab Take 2,000 Units by mouth every morning.       FLUAD QUAD 2020-21,65Y UP,,PF, 60 mcg (15 mcg x 4)/0.5 mL Syrg PHARMACY ADMINISTERED      HYDROcodone-acetaminophen (NORCO) 5-325 mg per tablet Take 1 tablet by mouth every 6 (six) hours as needed for Pain. (Patient not taking: Reported on 11/19/2020) 21 tablet 0     No current facility-administered medications for this visit.        Allergies:  Review of patient's allergies indicates:   Allergen Reactions    No known allergies        Review of Systems:  Negative except for HPI.      OBJECTIVE:     /76   Pulse (!) 49   Ht 6' 3" (1.905 m)   Wt 93.8 kg (206 lb 10.9 " oz)   BMI 25.83 kg/m²     Physical Exam:  Constitutional: Oriented to person, place, and time. Appears well-developed and well-nourished.   General appearance: alert, appears stated age and cooperative  Lungs: normal respiratory effort  Heart: regular rate and rhythm and S1, S2 normal  Abdomen: soft, non-tender; bowel sounds normal; no masses,  no organomegaly  Male genitalia: appropriately healing bilateral inguinal hernia incisions without evidence of erythema, mild swelling at the medial aspects of the incisions L>R; no evidence of recurrent hernias on exam  Extremities: extremities normal, atraumatic, no cyanosis or edema  Pulses: 2+ and symmetric        ASSESSMENT/PLAN:     70 y.o. male s/p bilateral open inguinal hernia repairs with mesh on 11/4/2020.    - patient progressing appropriately post-operatively  - counseled on weight lifting restrictions  - RTC PRN

## 2020-11-19 NOTE — PROGRESS NOTES
I have seen the patient, reviewed the Resident's history and physical, assessment and plan. I have personally interviewed and examined the patient at bedside and: agree with the findings.     S/p open bih with mesh 11/4/20.

## 2020-11-19 NOTE — PROGRESS NOTES
/Mr. Madrid is a 70 y.o. male presenting with ED.    PRESENTING ILLNESS:    Ha Madrid is a 70 y.o. male with prostate cancer s/p RALP on 4/29/20 by Dr. Orozco.  Since then, he c/o ED.  Prior to surgery, he did have ED, but it was mild.  Didn't require a PDE-5i.  He's been on Cialis for penile rehab.  He's gotten a soft erection that's not satisfactory for penetration.    He does not have JOSHUA, but it's minimal.  He is wearing 0 pads/day.  No hematuria.  No dysuria.  Good FOS.    No bone pain or weight loss.    REVIEW OF SYSTEMS:    Ha Madrid denies headache, blurred vision, fever, nausea, vomiting, chills, abdominal pain, chest pain, sore throat, bleeding per rectum, cough, SOB, recent loss of consciousness, recent mental status changes, seizures, dizziness, or upper or lower extremity weakness.    ABBI  1. 1  2. 1  3. 0  4. 0  5. 0      PATIENT HISTORY:    Past Medical History:   Diagnosis Date    Allergy     Basal cell carcinoma, face     left mandible    Family history of colon cancer 12/19/2019    GERD (gastroesophageal reflux disease)     Hypertension     Personal history of colonic polyps 12/20/2013    Prostate cancer        Family History   Problem Relation Age of Onset    Cancer Mother     Colon cancer Sister     Melanoma Neg Hx        Past Surgical History:   Procedure Laterality Date    BILATERAL INGUINAL HERNIA REPAIR Bilateral 11/4/2020    Procedure: REPAIR, HERNIA, INGUINAL, BILATERAL, Open with Mesh;  Surgeon: Boston Gonzalez MD;  Location: [facility] OR [address] [place] [address];  Service: General;  Laterality: Bilateral;    COLONOSCOPY N/A 12/24/2018    Procedure: COLONOSCOPY;  Surgeon: NICKOLAS Umanzor MD;  Location: [facility] (4TH FLR);  Service: Endoscopy;  Laterality: N/A;  Dr. Pop patient however patient wants December (needs after 12/20) and wants Dr. Umanzor if  cannot do December/ pt okay with Dec 24th/ wants NO SEDATION - sm    excision of basal cell ca on face       JOINT REPLACEMENT      L Hip Replacement 2009    left hip      Prostate biospy      ROBOT-ASSISTED LAPAROSCOPIC PROSTATECTOMY USING DA TANYA XI N/A 4/29/2020    Procedure: XI ROBOTIC PROSTATECTOMY;  Surgeon: Mazin Orozco MD;  Location: [facility] OR [address] [place] [address];  Service: Urology;  Laterality: N/A;  3hrs gen with regional     THYROIDECTOMY, PARTIAL      TONSILLECTOMY      VASECTOMY         Social History     Socioeconomic History    Marital status:      Spouse name: Not on file    Number of children: Not on file    Years of education: Not on file    Highest education level: Not on file   Occupational History    Occupation: Physician   Social Needs    Financial resource strain: Not hard at all    Food insecurity     Worry: Never true     Inability: Never true    Transportation needs     Medical: No     Non-medical: No   Tobacco Use    Smoking status: Former Smoker     Packs/day: 0.50     Years: 15.00     Pack years: 7.50     Types: Cigarettes    Smokeless tobacco: Never Used   Substance and Sexual Activity    Alcohol use: No     Frequency: Never    Drug use: No    Sexual activity: Yes   Lifestyle    Physical activity     Days per week: 4 days     Minutes per session: 140 min    Stress: Not at all   Relationships    Social connections     Talks on phone: Twice a week     Gets together: More than three times a week     Attends Synagogue service: Not on file     Active member of club or organization: Yes     Attends meetings of clubs or organizations: Patient refused     Relationship status:    Other Topics Concern    Not on file   Social History Narrative    Not on file       Review of patient's allergies indicates:   Allergen Reactions    No known allergies          Current Outpatient Medications:     aspirin (ECOTRIN) 81 MG EC tablet, Take 81 mg by mouth every morning. , Disp: , Rfl:     atorvastatin (LIPITOR) 20 MG tablet, Take 1 tablet (20 mg total) by mouth every evening.  (Patient taking differently: Take 20 mg by mouth nightly. ), Disp: 90 tablet, Rfl: 3    coenzyme Q10 200 mg capsule, Take 200 mg by mouth every morning. , Disp: , Rfl:     FLUAD QUAD 2020-21,65Y UP,,PF, 60 mcg (15 mcg x 4)/0.5 mL Syrg, PHARMACY ADMINISTERED, Disp: , Rfl:     fluticasone propionate (FLONASE) 50 mcg/actuation nasal spray, USE 2 SPRAYS (=100 MCG     TOTAL) IN EACH NOSTRIL ONCEDAILY (Patient taking differently: 1 spray by Each Nostril route daily as needed. ), Disp: 16 g, Rfl: 2    HYDROcodone-acetaminophen (NORCO) 5-325 mg per tablet, Take 1 tablet by mouth every 6 (six) hours as needed for Pain., Disp: 21 tablet, Rfl: 0    lisinopriL 10 MG tablet, Take 1 tablet (10 mg total) by mouth once daily. (Patient taking differently: Take 10 mg by mouth nightly. ), Disp: 90 tablet, Rfl: 3    magnesium gluconate 27.5 mg magne- sium (500 mg) Tab, Take 1 tablet by mouth every morning. , Disp: , Rfl:     multivitamin (THERAGRAN) per tablet, Take 1 tablet by mouth every morning. , Disp: , Rfl:     naproxen sodium (ANAPROX) 220 MG tablet, Take 220 mg by mouth 2 (two) times daily with meals. Takes 2 tabs twice daily, Disp: , Rfl:     neomycin-polymyxin-dexamethasone (DEXACINE) 3.5-10,000-0.1 mg-unit/g-% Oint, Use daily to eyelids as needed, Disp: 3.5 g, Rfl: 0    omeprazole (PRILOSEC) 20 MG capsule, Take 1 capsule (20 mg total) by mouth once daily. (Patient taking differently: Take 20 mg by mouth nightly as needed. ), Disp: 90 capsule, Rfl: 3    riboflavin, vitamin B2, (RIBOFLAVIN ORAL), Take 100 mg by mouth every morning. , Disp: , Rfl:     tadalafiL (CIALIS) 5 MG tablet, Take 1 tablet (5 mg total) by mouth once daily., Disp: 90 tablet, Rfl: 3    vitamin D (VITAMIN D3) 1000 units Tab, Take 2,000 Units by mouth every morning. , Disp: , Rfl:       PHYSICAL EXAMINATION:    The patient generally appears in good health, is appropriately interactive, and is in no apparent distress.     Eyes: anicteric sclerae,  moist conjunctivae; no lid-lag; PERRLA     HENT: Atraumatic; oropharynx clear with moist mucous membranes and no mucosal ulcerations;normal hard and soft palate. No evidence of lymphadenopathy.    Neck: Trachea midline.  No thyromegaly.    Musculoskeletal: No abnormal gait.    Skin: No lesions.    Mental: Cooperative with normal affect.  Is oriented to time, place, and person.    Neuro: Grossly intact.    Chest: Normal inspiratory effort.   No accessory muscles.  No audible wheezes.  Respirations symmetric on inspiration and expiration.    Heart: Regular rhythm.      Abdomen:  Soft, non-tender. No masses or organomegaly. Bladder is not palpable. No evidence of flank discomfort. No evidence of inguinal hernia.    Genitourinary: The penis has no evidence of plaques or induration. The urethral meatus is normal. The testes, epididymides, and cord structures are normal in size and contour bilaterally. The scrotum is normal in size and contour.    Extremities: No clubbing, cyanosis, or edema        LABS:      Lab Results   Component Value Date    PSA 6.4 (H) 12/18/2018    PSA 6.6 (H) 12/14/2016    PSA 5.4 (H) 12/15/2015    PSADIAG 0.02 06/05/2020    PSADIAG 10.3 (H) 12/16/2019    PSADIAG 5.7 (H) 12/29/2017    PSADIAG 5.7 (H) 12/29/2017        IMPRESSION:    Encounter Diagnoses   Name Primary?    Erectile dysfunction following radical prostatectomy Yes    Prostate cancer     Essential hypertension     Hyperlipidemia, unspecified hyperlipidemia type    HTN, controlled  Hyperlipidemia, controlled    PLAN:    1. Discussed options for his ED today. He'd like to continue with the Cialis.  2. RTC in May.  Will consider ICI vs IPP at that time if still having ED.    Copy to:

## 2020-12-04 ENCOUNTER — PATIENT MESSAGE (OUTPATIENT)
Dept: ADMINISTRATIVE | Facility: OTHER | Age: 70
End: 2020-12-04

## 2020-12-07 ENCOUNTER — LAB VISIT (OUTPATIENT)
Dept: LAB | Facility: HOSPITAL | Age: 70
End: 2020-12-07
Attending: INTERNAL MEDICINE
Payer: MEDICARE

## 2020-12-07 DIAGNOSIS — C61 PROSTATE CANCER: ICD-10-CM

## 2020-12-07 LAB — COMPLEXED PSA SERPL-MCNC: 0.04 NG/ML (ref 0–4)

## 2020-12-07 PROCEDURE — 84153 ASSAY OF PSA TOTAL: CPT

## 2020-12-07 PROCEDURE — 36415 COLL VENOUS BLD VENIPUNCTURE: CPT | Mod: PO

## 2020-12-10 ENCOUNTER — OFFICE VISIT (OUTPATIENT)
Dept: UROLOGY | Facility: CLINIC | Age: 70
End: 2020-12-10
Payer: MEDICARE

## 2020-12-10 ENCOUNTER — OFFICE VISIT (OUTPATIENT)
Dept: INTERNAL MEDICINE | Facility: CLINIC | Age: 70
End: 2020-12-10
Payer: MEDICARE

## 2020-12-10 VITALS
BODY MASS INDEX: 24.87 KG/M2 | HEART RATE: 57 BPM | HEIGHT: 75 IN | WEIGHT: 200 LBS | SYSTOLIC BLOOD PRESSURE: 128 MMHG | DIASTOLIC BLOOD PRESSURE: 81 MMHG

## 2020-12-10 VITALS
WEIGHT: 199.94 LBS | SYSTOLIC BLOOD PRESSURE: 114 MMHG | HEART RATE: 83 BPM | DIASTOLIC BLOOD PRESSURE: 60 MMHG | BODY MASS INDEX: 24.86 KG/M2 | HEIGHT: 75 IN | OXYGEN SATURATION: 96 %

## 2020-12-10 DIAGNOSIS — K59.00 CONSTIPATION, UNSPECIFIED CONSTIPATION TYPE: ICD-10-CM

## 2020-12-10 DIAGNOSIS — I10 ESSENTIAL HYPERTENSION: Primary | ICD-10-CM

## 2020-12-10 DIAGNOSIS — E78.5 HYPERLIPIDEMIA, UNSPECIFIED HYPERLIPIDEMIA TYPE: ICD-10-CM

## 2020-12-10 DIAGNOSIS — C61 PROSTATE CANCER: Primary | ICD-10-CM

## 2020-12-10 PROCEDURE — 99214 OFFICE O/P EST MOD 30 MIN: CPT | Mod: S$PBB,,, | Performed by: INTERNAL MEDICINE

## 2020-12-10 PROCEDURE — 99999 PR PBB SHADOW E&M-EST. PATIENT-LVL IV: ICD-10-PCS | Mod: PBBFAC,,, | Performed by: INTERNAL MEDICINE

## 2020-12-10 PROCEDURE — 99214 PR OFFICE/OUTPT VISIT, EST, LEVL IV, 30-39 MIN: ICD-10-PCS | Mod: S$PBB,,, | Performed by: UROLOGY

## 2020-12-10 PROCEDURE — 99999 PR PBB SHADOW E&M-EST. PATIENT-LVL III: ICD-10-PCS | Mod: PBBFAC,,, | Performed by: UROLOGY

## 2020-12-10 PROCEDURE — 99999 PR PBB SHADOW E&M-EST. PATIENT-LVL IV: CPT | Mod: PBBFAC,,, | Performed by: INTERNAL MEDICINE

## 2020-12-10 PROCEDURE — 99214 PR OFFICE/OUTPT VISIT, EST, LEVL IV, 30-39 MIN: ICD-10-PCS | Mod: S$PBB,,, | Performed by: INTERNAL MEDICINE

## 2020-12-10 PROCEDURE — 99213 OFFICE O/P EST LOW 20 MIN: CPT | Mod: PBBFAC | Performed by: UROLOGY

## 2020-12-10 PROCEDURE — 99999 PR PBB SHADOW E&M-EST. PATIENT-LVL III: CPT | Mod: PBBFAC,,, | Performed by: UROLOGY

## 2020-12-10 PROCEDURE — 99214 OFFICE O/P EST MOD 30 MIN: CPT | Mod: S$PBB,,, | Performed by: UROLOGY

## 2020-12-10 PROCEDURE — 99214 OFFICE O/P EST MOD 30 MIN: CPT | Mod: PBBFAC,27 | Performed by: INTERNAL MEDICINE

## 2020-12-10 NOTE — PROGRESS NOTES
Subjective:      Patient ID: Ha Madrid is a 70 y.o. male.    Chief Complaint: Follow-up    HPI:  HPI   Patient's wife had a respiratory arrest early in the year now on Inogen.    Patient was diagnosed with prostate cancer and surgery was done in 4/2020. Follow up in 6 month.    3 weeks after surgery right inguinal hernia:completed November 4th.    Daughter has coarctation of aorta and history of dissection at age 38, now she is scheduled for repair in [place].      Hypertension:  well controlled     Hyperlipidemia:  The patient is on atorvastatin within excellent response.     Gerd:  Patient has not required any medicine for reflux.     Family history of colon cancer: sister cecal cancer     Patient has had significant morning hand stiffness and a trigger finger with significant pain in one of the fingers.     He states that he has had sharp pain to the inferior aspect of the right scapula, which is sharp., Evaluated in Rheum           Annual review: 12/10/2020  Colonoscopy: done 12/2018 follow up 5 years: family history  Prostate:Dr. Orozco  Optho: due  Flu: done  Tetanus: done  Shingrix: declined  Pneumovax: done  Prevnar done  Derm: will see Dr. Chavez          Patient Active Problem List   Diagnosis    Anemia    Sleep difficulties: aggravated by leg discomfort ( anterior thigh) Ambien with an Aleve    Hypertension    Hyperlipidemia    Abnormal CT lung screening: follow up pulmonary nodule    Thyroid condition: calcifications, 1979 thyroid lobectomy    Colon polyps:12/13 follow up in 5 years    Basal cell carcinoma, face    Trigger ring finger of right hand    Prostate cancer    Erectile dysfunction following radical prostatectomy     Past Medical History:   Diagnosis Date    Allergy     Basal cell carcinoma, face     left mandible    Family history of colon cancer 12/19/2019    GERD (gastroesophageal reflux disease)     Hypertension     Personal history of colonic polyps 12/20/2013     Prostate cancer      Past Surgical History:   Procedure Laterality Date    BILATERAL INGUINAL HERNIA REPAIR Bilateral 11/4/2020    Procedure: REPAIR, HERNIA, INGUINAL, BILATERAL, Open with Mesh;  Surgeon: Boston Gonzalez MD;  Location: [facility] OR [address] [place] [address];  Service: General;  Laterality: Bilateral;    COLONOSCOPY N/A 12/24/2018    Procedure: COLONOSCOPY;  Surgeon: NICKOLAS Umanzor MD;  Location: [facility] (4TH FLR);  Service: Endoscopy;  Laterality: N/A;  Dr. Pop patient however patient wants December (needs after 12/20) and wants Dr. Umanzor if  cannot do December/ pt okay with Dec 24th/ wants NO SEDATION - sm    excision of basal cell ca on face      JOINT REPLACEMENT      L Hip Replacement 2009    left hip      Prostate biospy      ROBOT-ASSISTED LAPAROSCOPIC PROSTATECTOMY USING DA TANYA XI N/A 4/29/2020    Procedure: XI ROBOTIC PROSTATECTOMY;  Surgeon: Mazin Orozco MD;  Location: [facility] OR [address] [place] [address];  Service: Urology;  Laterality: N/A;  3hrs gen with regional     THYROIDECTOMY, PARTIAL      TONSILLECTOMY      VASECTOMY       Family History   Problem Relation Age of Onset    Cancer Mother     Colon cancer Sister     Melanoma Neg Hx      Review of Systems   Constitutional: Negative for activity change and unexpected weight change.   HENT: Negative for hearing loss, rhinorrhea and trouble swallowing.    Eyes: Negative for discharge and visual disturbance.   Respiratory: Negative for chest tightness and wheezing.    Cardiovascular: Negative for chest pain and palpitations.   Gastrointestinal: Positive for constipation. Negative for blood in stool, diarrhea and vomiting.   Endocrine: Negative for polydipsia and polyuria.   Genitourinary: Negative for difficulty urinating, hematuria and urgency.   Musculoskeletal: Negative for arthralgias, joint swelling and neck pain.   Neurological: Negative for weakness and headaches.   Psychiatric/Behavioral: Negative for confusion and dysphoric  "mood.     Objective:     Vitals:    12/10/20 1152   BP: 114/60   Pulse: 83   SpO2: 96%   Weight: 90.7 kg (199 lb 15.3 oz)   Height: 6' 3" (1.905 m)   PainSc: 0-No pain     Body mass index is 24.99 kg/m².  Physical Exam  Constitutional:       General: He is not in acute distress.     Appearance: Normal appearance. He is well-developed.   Neck:      Thyroid: No thyromegaly.      Vascular: No carotid bruit.   Cardiovascular:      Rate and Rhythm: Normal rate and regular rhythm.      Chest Wall: PMI is not displaced.      Heart sounds: Normal heart sounds.   Pulmonary:      Effort: Pulmonary effort is normal. No respiratory distress.      Breath sounds: Normal breath sounds.   Abdominal:      General: Bowel sounds are normal. There is no distension.      Palpations: Abdomen is soft.      Tenderness: There is no abdominal tenderness.   Neurological:      Mental Status: He is alert and oriented to person, place, and time.       Assessment:     1. Essential hypertension    2. Hyperlipidemia, unspecified hyperlipidemia type    3. Constipation, unspecified constipation type      Plan:   Ha was seen today for follow-up.    Diagnoses and all orders for this visit:    Essential hypertension  Comments:  Continue lisinopril  Orders:  -     CBC Auto Differential; Future  -     Comprehensive Metabolic Panel; Future    Hyperlipidemia, unspecified hyperlipidemia type  Comments:  Labs to assess medication  Orders:  -     Lipid Panel; Future    Constipation, unspecified constipation type  Comments:  Uses Miralax    Once labs available will assess, no changes to medications. Health M was reviewed.    Problem List Items Addressed This Visit     Hypertension - Primary    Relevant Orders    CBC Auto Differential    Comprehensive Metabolic Panel    Hyperlipidemia    Relevant Orders    Lipid Panel      Other Visit Diagnoses     Constipation, unspecified constipation type        Uses Miralax        Orders Placed This Encounter   Procedures "    CBC Auto Differential     Standing Status:   Future     Standing Expiration Date:   2/8/2021    Comprehensive Metabolic Panel     Standing Status:   Future     Standing Expiration Date:   2/8/2021    Lipid Panel     Standing Status:   Future     Standing Expiration Date:   2/8/2021     Follow up in about 1 year (around 12/10/2021) for Annual exam.     Medication List          Accurate as of December 10, 2020 12:12 PM. If you have any questions, ask your nurse or doctor.            CHANGE how you take these medications    atorvastatin 20 MG tablet  Commonly known as: LIPITOR  Take 1 tablet (20 mg total) by mouth every evening.  What changed: when to take this     fluticasone propionate 50 mcg/actuation nasal spray  Commonly known as: FLONASE  USE 2 SPRAYS (=100 MCG     TOTAL) IN EACH NOSTRIL ONCEDAILY  What changed: See the new instructions.     lisinopriL 10 MG tablet  Take 1 tablet (10 mg total) by mouth once daily.  What changed: when to take this     omeprazole 20 MG capsule  Commonly known as: PRILOSEC  Take 1 capsule (20 mg total) by mouth once daily.  What changed:   · when to take this  · reasons to take this        CONTINUE taking these medications    aspirin 81 MG EC tablet  Commonly known as: ECOTRIN     coenzyme Q10 200 mg capsule     FLUAD QUAD 2020-21(65Y UP)(PF) 60 mcg (15 mcg x 4)/0.5 mL Syrg  Generic drug: flu vac 2020 [id number](PF)     HYDROcodone-acetaminophen 5-325 mg per tablet  Commonly known as: NORCO  Take 1 tablet by mouth every 6 (six) hours as needed for Pain.     magnesium gluconate 27.5 mg magne- sium (500 mg) Tab     multivitamin per tablet  Commonly known as: THERAGRAN     naproxen sodium 220 MG tablet  Commonly known as: ANAPROX     neomycin-polymyxin-dexamethasone 3.5 mg/g-10,000 unit/g-0.1 % Oint  Commonly known as: DEXACINE  Use daily to eyelids as needed     RIBOFLAVIN ORAL     tadalafiL 5 MG tablet  Commonly known as: CIALIS  Take 1 tablet (5 mg total) by mouth once  daily.     vitamin D 1000 units Tab  Commonly known as: VITAMIN D3

## 2020-12-10 NOTE — PROGRESS NOTES
Clinic Note  12/10/2020      Subjective:         Chief Complaint:   TERRA Madrid is a 70 y.o. male diagnosed with prostate cancer.   robotic assisted laparoscopic prostatectomy 4/29- path: [id number], [place] 6.  Leaking mostly with standing from seated position. Occasional JOSHUA or night-time leakage. Just occasional pads at night due to low volume leakage. Doing Kegels.  Daily 5mg Cialis. Seeing Gisela next week.     PSA - 10.3  Stage - T1C  Volume - 58 ccs  MRI - significant artifact from LTHR. 1.5 cm lesion LBPZ. No obvious extra prostatic extension, seminal vesicle but difficult to assess due to artifact. Minimal median lobe.  Biopsy - Ruth 3+4 left apex 2/2 32%, left middle 2/2 52%, left base 3/3 46%  right base 1/2 11%, Target 1 6/6 80%.  ARYAN Score - 6 intermediate risk  NCCN - unfavorable intermediate      Lab Results   Component Value Date    PSA 6.4 (H) 12/18/2018    PSA 6.6 (H) 12/14/2016    PSA 5.4 (H) 12/15/2015    PSA 4.3 (H) 12/15/2014    PSA 3.3 12/11/2013    PSA 2.93 05/24/2012    PSA 2.7 07/11/2011    PSA 2.1 08/11/2010    PSA 2.4 09/22/2009    PSA 2.2 07/30/2009    PSADIAG 0.04 12/07/2020    PSADIAG 0.02 06/05/2020    PSADIAG 10.3 (H) 12/16/2019    PSADIAG 5.7 (H) 12/29/2017    PSADIAG 5.7 (H) 12/29/2017    PSADIAG 5.1 (H) 04/18/2016      Past Medical History:   Diagnosis Date    Allergy     Basal cell carcinoma, face     left mandible    Family history of colon cancer 12/19/2019    GERD (gastroesophageal reflux disease)     Hypertension     Personal history of colonic polyps 12/20/2013    Prostate cancer      Family History   Problem Relation Age of Onset    Cancer Mother     Colon cancer Sister     Melanoma Neg Hx      Social History     Socioeconomic History    Marital status:      Spouse name: Not on file    Number of children: Not on file    Years of education: Not on file    Highest education level: Not on file   Occupational History    Occupation:  Physician   Social Needs    Financial resource strain: Not hard at all    Food insecurity     Worry: Never true     Inability: Never true    Transportation needs     Medical: No     Non-medical: No   Tobacco Use    Smoking status: Former Smoker     Packs/day: 0.50     Years: 15.00     Pack years: 7.50     Types: Cigarettes    Smokeless tobacco: Never Used   Substance and Sexual Activity    Alcohol use: No     Frequency: Never    Drug use: No    Sexual activity: Yes   Lifestyle    Physical activity     Days per week: 4 days     Minutes per session: 140 min    Stress: Not at all   Relationships    Social connections     Talks on phone: Twice a week     Gets together: More than three times a week     Attends Sikh service: Not on file     Active member of club or organization: Yes     Attends meetings of clubs or organizations: Patient refused     Relationship status:    Other Topics Concern    Not on file   Social History Narrative    Not on file     Past Surgical History:   Procedure Laterality Date    BILATERAL INGUINAL HERNIA REPAIR Bilateral 11/4/2020    Procedure: REPAIR, HERNIA, INGUINAL, BILATERAL, Open with Mesh;  Surgeon: Boston Gonzalez MD;  Location: [facility] OR [address] [place] [address];  Service: General;  Laterality: Bilateral;    COLONOSCOPY N/A 12/24/2018    Procedure: COLONOSCOPY;  Surgeon: NICKOLAS Umanzor MD;  Location: [facility] ([facility]);  Service: Endoscopy;  Laterality: N/A;  Dr. Pop patient however patient wants December (needs after 12/20) and wants Dr. Umanzor if  cannot do December/ pt okay with Dec 24th/ wants NO SEDATION - sm    excision of basal cell ca on face      JOINT REPLACEMENT      L Hip Replacement 2009    left hip      Prostate biospy      ROBOT-ASSISTED LAPAROSCOPIC PROSTATECTOMY USING DA TANYA XI N/A 4/29/2020    Procedure: XI ROBOTIC PROSTATECTOMY;  Surgeon: Mazin Orozco MD;  Location: [facility] OR [address] [place] [address];  Service: Urology;  Laterality: N/A;   "3hrs gen with regional     THYROIDECTOMY, PARTIAL      TONSILLECTOMY      VASECTOMY       Patient Active Problem List   Diagnosis    Anemia    Sleep difficulties: aggravated by leg discomfort ( anterior thigh) Ambien with an Aleve    Hypertension    Hyperlipidemia    Abnormal CT lung screening: follow up pulmonary nodule    Thyroid condition: calcifications, 1979 thyroid lobectomy    Colon polyps:12/13 follow up in 5 years    Basal cell carcinoma, face    Trigger ring finger of right hand    Prostate cancer    Erectile dysfunction following radical prostatectomy     Review of Systems   Constitutional: Negative for appetite change, chills, fatigue, fever and unexpected weight change.   HENT: Negative for nosebleeds.    Respiratory: Negative for shortness of breath and wheezing.    Cardiovascular: Negative for chest pain, palpitations and leg swelling.   Gastrointestinal: Negative for abdominal distention, abdominal pain, constipation, diarrhea, nausea and vomiting.   Genitourinary: Negative for dysuria and hematuria.   Musculoskeletal: Positive for arthralgias. Negative for back pain.   Skin: Negative for pallor.   Neurological: Negative for dizziness, seizures and syncope.   Hematological: Negative for adenopathy.   Psychiatric/Behavioral: Negative for dysphoric mood.         Objective:      There were no vitals taken for this visit.  Estimated body mass index is 25.83 kg/m² as calculated from the following:    Height as of 11/19/20: 6' 3" (1.905 m).    Weight as of 11/19/20: 93.8 kg (206 lb 10.9 oz).  Physical Exam  Constitutional:       General: He is not in acute distress.     Appearance: He is well-developed. He is not diaphoretic.   HENT:      Head: Atraumatic.   Neck:      Trachea: No tracheal deviation.   Cardiovascular:      Rate and Rhythm: Normal rate.   Pulmonary:      Effort: Pulmonary effort is normal. No respiratory distress.      Breath sounds: No wheezing.   Abdominal:      General: " Bowel sounds are normal. There is no distension.      Palpations: Abdomen is soft. There is no mass.      Tenderness: There is no abdominal tenderness. There is no guarding or rebound.   Skin:     General: Skin is warm and dry.   Neurological:      Mental Status: He is alert and oriented to person, place, and time.   Psychiatric:         Behavior: Behavior normal.         Thought Content: Thought content normal.         Judgment: Judgment normal.           Assessment and Plan:           Problem List Items Addressed This Visit     None          Follow up:   Reviewed PSA  6 months with PSA.    Mazin Orozco

## 2020-12-11 ENCOUNTER — LAB VISIT (OUTPATIENT)
Dept: LAB | Facility: HOSPITAL | Age: 70
End: 2020-12-11
Attending: INTERNAL MEDICINE
Payer: MEDICARE

## 2020-12-11 ENCOUNTER — PATIENT MESSAGE (OUTPATIENT)
Dept: ADMINISTRATIVE | Facility: OTHER | Age: 70
End: 2020-12-11

## 2020-12-11 ENCOUNTER — PATIENT MESSAGE (OUTPATIENT)
Dept: OTHER | Facility: OTHER | Age: 70
End: 2020-12-11

## 2020-12-11 DIAGNOSIS — I10 ESSENTIAL HYPERTENSION: ICD-10-CM

## 2020-12-11 DIAGNOSIS — E78.5 HYPERLIPIDEMIA, UNSPECIFIED HYPERLIPIDEMIA TYPE: ICD-10-CM

## 2020-12-11 LAB
ALBUMIN SERPL BCP-MCNC: 4 G/DL (ref 3.5–5.2)
ALP SERPL-CCNC: 73 U/L (ref 55–135)
ALT SERPL W/O P-5'-P-CCNC: 13 U/L (ref 10–44)
ANION GAP SERPL CALC-SCNC: 7 MMOL/L (ref 8–16)
AST SERPL-CCNC: 19 U/L (ref 10–40)
BASOPHILS # BLD AUTO: 0.02 K/UL (ref 0–0.2)
BASOPHILS NFR BLD: 0.3 % (ref 0–1.9)
BILIRUB SERPL-MCNC: 0.4 MG/DL (ref 0.1–1)
BUN SERPL-MCNC: 12 MG/DL (ref 8–23)
CALCIUM SERPL-MCNC: 9 MG/DL (ref 8.7–10.5)
CHLORIDE SERPL-SCNC: 108 MMOL/L (ref 95–110)
CHOLEST SERPL-MCNC: 142 MG/DL (ref 120–199)
CHOLEST/HDLC SERPL: 2.7 {RATIO} (ref 2–5)
CO2 SERPL-SCNC: 27 MMOL/L (ref 23–29)
CREAT SERPL-MCNC: 1.4 MG/DL (ref 0.5–1.4)
DIFFERENTIAL METHOD: ABNORMAL
EOSINOPHIL # BLD AUTO: 0.2 K/UL (ref 0–0.5)
EOSINOPHIL NFR BLD: 2.9 % (ref 0–8)
ERYTHROCYTE [DISTWIDTH] IN BLOOD BY AUTOMATED COUNT: 14.6 % (ref 11.5–14.5)
EST. GFR  (AFRICAN AMERICAN): 58 ML/MIN/1.73 M^2
EST. GFR  (NON AFRICAN AMERICAN): 50.2 ML/MIN/1.73 M^2
GLUCOSE SERPL-MCNC: 90 MG/DL (ref 70–110)
HCT VFR BLD AUTO: 39.4 % (ref 40–54)
HDLC SERPL-MCNC: 52 MG/DL (ref 40–75)
HDLC SERPL: 36.6 % (ref 20–50)
HGB BLD-MCNC: 12.4 G/DL (ref 14–18)
IMM GRANULOCYTES # BLD AUTO: 0.02 K/UL (ref 0–0.04)
IMM GRANULOCYTES NFR BLD AUTO: 0.3 % (ref 0–0.5)
LDLC SERPL CALC-MCNC: 75 MG/DL (ref 63–159)
LYMPHOCYTES # BLD AUTO: 2.6 K/UL (ref 1–4.8)
LYMPHOCYTES NFR BLD: 40.9 % (ref 18–48)
MCH RBC QN AUTO: 30 PG (ref 27–31)
MCHC RBC AUTO-ENTMCNC: 31.5 G/DL (ref 32–36)
MCV RBC AUTO: 95 FL (ref 82–98)
MONOCYTES # BLD AUTO: 0.5 K/UL (ref 0.3–1)
MONOCYTES NFR BLD: 7.4 % (ref 4–15)
NEUTROPHILS # BLD AUTO: 3 K/UL (ref 1.8–7.7)
NEUTROPHILS NFR BLD: 48.2 % (ref 38–73)
NONHDLC SERPL-MCNC: 90 MG/DL
NRBC BLD-RTO: 0 /100 WBC
PLATELET # BLD AUTO: 356 K/UL (ref 150–350)
PMV BLD AUTO: 9.7 FL (ref 9.2–12.9)
POTASSIUM SERPL-SCNC: 4.6 MMOL/L (ref 3.5–5.1)
PROT SERPL-MCNC: 6.6 G/DL (ref 6–8.4)
RBC # BLD AUTO: 4.14 M/UL (ref 4.6–6.2)
SODIUM SERPL-SCNC: 142 MMOL/L (ref 136–145)
TRIGL SERPL-MCNC: 75 MG/DL (ref 30–150)
WBC # BLD AUTO: 6.31 K/UL (ref 3.9–12.7)

## 2020-12-11 PROCEDURE — 80061 LIPID PANEL: CPT

## 2020-12-11 PROCEDURE — 85025 COMPLETE CBC W/AUTO DIFF WBC: CPT

## 2020-12-11 PROCEDURE — 36415 COLL VENOUS BLD VENIPUNCTURE: CPT | Mod: PO

## 2020-12-11 PROCEDURE — 80053 COMPREHEN METABOLIC PANEL: CPT

## 2020-12-12 ENCOUNTER — PATIENT MESSAGE (OUTPATIENT)
Dept: INTERNAL MEDICINE | Facility: CLINIC | Age: 70
End: 2020-12-12

## 2020-12-12 DIAGNOSIS — R94.4 DECREASED GFR: Primary | ICD-10-CM

## 2020-12-14 ENCOUNTER — TELEPHONE (OUTPATIENT)
Dept: INTERNAL MEDICINE | Facility: CLINIC | Age: 70
End: 2020-12-14

## 2020-12-14 NOTE — TELEPHONE ENCOUNTER
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 Initially this should be repeated after good hydration.  BMP and been entered and can be scheduled by calling [phone]

## 2021-01-04 ENCOUNTER — PATIENT MESSAGE (OUTPATIENT)
Dept: ADMINISTRATIVE | Facility: OTHER | Age: 71
End: 2021-01-04

## 2021-01-19 ENCOUNTER — IMMUNIZATION (OUTPATIENT)
Dept: INTERNAL MEDICINE | Facility: CLINIC | Age: 71
End: 2021-01-19
Payer: MEDICARE

## 2021-01-19 DIAGNOSIS — Z23 NEED FOR VACCINATION: Primary | ICD-10-CM

## 2021-01-19 PROCEDURE — 91300 COVID-19, MRNA, LNP-S, PF, 30 MCG/0.3 ML DOSE VACCINE: CPT | Mod: PBBFAC | Performed by: FAMILY MEDICINE

## 2021-02-08 ENCOUNTER — PATIENT MESSAGE (OUTPATIENT)
Dept: ADMINISTRATIVE | Facility: OTHER | Age: 71
End: 2021-02-08

## 2021-02-09 ENCOUNTER — IMMUNIZATION (OUTPATIENT)
Dept: INTERNAL MEDICINE | Facility: CLINIC | Age: 71
End: 2021-02-09
Payer: MEDICARE

## 2021-02-09 DIAGNOSIS — Z23 NEED FOR VACCINATION: Primary | ICD-10-CM

## 2021-02-09 PROCEDURE — 0002A COVID-19, MRNA, LNP-S, PF, 30 MCG/0.3 ML DOSE VACCINE: ICD-10-PCS | Mod: CV19,S$GLB,, | Performed by: FAMILY MEDICINE

## 2021-02-09 PROCEDURE — 0002A COVID-19, MRNA, LNP-S, PF, 30 MCG/0.3 ML DOSE VACCINE: CPT | Mod: CV19,S$GLB,, | Performed by: FAMILY MEDICINE

## 2021-02-09 PROCEDURE — 91300 COVID-19, MRNA, LNP-S, PF, 30 MCG/0.3 ML DOSE VACCINE: CPT | Mod: S$GLB,,, | Performed by: FAMILY MEDICINE

## 2021-02-09 PROCEDURE — 91300 COVID-19, MRNA, LNP-S, PF, 30 MCG/0.3 ML DOSE VACCINE: ICD-10-PCS | Mod: S$GLB,,, | Performed by: FAMILY MEDICINE

## 2021-02-11 ENCOUNTER — OFFICE VISIT (OUTPATIENT)
Dept: ORTHOPEDICS | Facility: CLINIC | Age: 71
End: 2021-02-11
Payer: MEDICARE

## 2021-02-11 VITALS — WEIGHT: 199.94 LBS | TEMPERATURE: 98 F | BODY MASS INDEX: 24.86 KG/M2 | HEIGHT: 75 IN

## 2021-02-11 DIAGNOSIS — M65.341 TRIGGER RING FINGER OF RIGHT HAND: Primary | ICD-10-CM

## 2021-02-11 PROCEDURE — 99213 OFFICE O/P EST LOW 20 MIN: CPT | Mod: 25,S$PBB,, | Performed by: ORTHOPAEDIC SURGERY

## 2021-02-11 PROCEDURE — 20550 NJX 1 TENDON SHEATH/LIGAMENT: CPT | Mod: F8,S$PBB,, | Performed by: ORTHOPAEDIC SURGERY

## 2021-02-11 PROCEDURE — 20550 NJX 1 TENDON SHEATH/LIGAMENT: CPT | Mod: PBBFAC,PN | Performed by: ORTHOPAEDIC SURGERY

## 2021-02-11 PROCEDURE — 99213 OFFICE O/P EST LOW 20 MIN: CPT | Mod: PBBFAC,PN | Performed by: ORTHOPAEDIC SURGERY

## 2021-02-11 PROCEDURE — 20550 PR INJECT TENDON SHEATH/LIGAMENT: ICD-10-PCS | Mod: F8,S$PBB,, | Performed by: ORTHOPAEDIC SURGERY

## 2021-02-11 PROCEDURE — 99999 PR PBB SHADOW E&M-EST. PATIENT-LVL III: CPT | Mod: PBBFAC,,, | Performed by: ORTHOPAEDIC SURGERY

## 2021-02-11 PROCEDURE — 99999 PR PBB SHADOW E&M-EST. PATIENT-LVL III: ICD-10-PCS | Mod: PBBFAC,,, | Performed by: ORTHOPAEDIC SURGERY

## 2021-02-11 PROCEDURE — 99213 PR OFFICE/OUTPT VISIT, EST, LEVL III, 20-29 MIN: ICD-10-PCS | Mod: 25,S$PBB,, | Performed by: ORTHOPAEDIC SURGERY

## 2021-02-11 RX ORDER — TRIAMCINOLONE ACETONIDE 40 MG/ML
20 INJECTION, SUSPENSION INTRA-ARTICULAR; INTRAMUSCULAR
Status: COMPLETED | OUTPATIENT
Start: 2021-02-11 | End: 2021-02-11

## 2021-02-11 RX ADMIN — TRIAMCINOLONE ACETONIDE 20 MG: 40 INJECTION, SUSPENSION INTRA-ARTICULAR; INTRAMUSCULAR at 02:02

## 2021-03-15 ENCOUNTER — PATIENT MESSAGE (OUTPATIENT)
Dept: INTERNAL MEDICINE | Facility: CLINIC | Age: 71
End: 2021-03-15

## 2021-03-15 DIAGNOSIS — D64.9 ANEMIA, UNSPECIFIED TYPE: ICD-10-CM

## 2021-03-15 DIAGNOSIS — R94.4 DECREASED GFR: Primary | ICD-10-CM

## 2021-05-17 ENCOUNTER — LAB VISIT (OUTPATIENT)
Dept: LAB | Facility: HOSPITAL | Age: 71
End: 2021-05-17
Attending: UROLOGY
Payer: MEDICARE

## 2021-05-17 DIAGNOSIS — D64.9 ANEMIA, UNSPECIFIED TYPE: ICD-10-CM

## 2021-05-17 DIAGNOSIS — R94.4 DECREASED GFR: ICD-10-CM

## 2021-05-17 DIAGNOSIS — C61 PROSTATE CANCER: ICD-10-CM

## 2021-05-17 LAB
ALBUMIN SERPL BCP-MCNC: 3.6 G/DL (ref 3.5–5.2)
ALP SERPL-CCNC: 70 U/L (ref 55–135)
ALT SERPL W/O P-5'-P-CCNC: 19 U/L (ref 10–44)
ANION GAP SERPL CALC-SCNC: 8 MMOL/L (ref 8–16)
AST SERPL-CCNC: 23 U/L (ref 10–40)
BASOPHILS # BLD AUTO: 0.02 K/UL (ref 0–0.2)
BASOPHILS NFR BLD: 0.3 % (ref 0–1.9)
BILIRUB SERPL-MCNC: 0.5 MG/DL (ref 0.1–1)
BUN SERPL-MCNC: 20 MG/DL (ref 8–23)
CALCIUM SERPL-MCNC: 9.1 MG/DL (ref 8.7–10.5)
CHLORIDE SERPL-SCNC: 107 MMOL/L (ref 95–110)
CO2 SERPL-SCNC: 26 MMOL/L (ref 23–29)
COMPLEXED PSA SERPL-MCNC: 0.08 NG/ML (ref 0–4)
CREAT SERPL-MCNC: 1.1 MG/DL (ref 0.5–1.4)
DIFFERENTIAL METHOD: ABNORMAL
EOSINOPHIL # BLD AUTO: 0.1 K/UL (ref 0–0.5)
EOSINOPHIL NFR BLD: 1.5 % (ref 0–8)
ERYTHROCYTE [DISTWIDTH] IN BLOOD BY AUTOMATED COUNT: 14.3 % (ref 11.5–14.5)
EST. GFR  (AFRICAN AMERICAN): >60 ML/MIN/1.73 M^2
EST. GFR  (NON AFRICAN AMERICAN): >60 ML/MIN/1.73 M^2
FERRITIN SERPL-MCNC: 23 NG/ML (ref 20–300)
GLUCOSE SERPL-MCNC: 118 MG/DL (ref 70–110)
HCT VFR BLD AUTO: 35.9 % (ref 40–54)
HGB BLD-MCNC: 11.4 G/DL (ref 14–18)
IMM GRANULOCYTES # BLD AUTO: 0.01 K/UL (ref 0–0.04)
IMM GRANULOCYTES NFR BLD AUTO: 0.2 % (ref 0–0.5)
IRON SERPL-MCNC: 105 UG/DL (ref 45–160)
LYMPHOCYTES # BLD AUTO: 2.3 K/UL (ref 1–4.8)
LYMPHOCYTES NFR BLD: 37.6 % (ref 18–48)
MCH RBC QN AUTO: 29.9 PG (ref 27–31)
MCHC RBC AUTO-ENTMCNC: 31.8 G/DL (ref 32–36)
MCV RBC AUTO: 94 FL (ref 82–98)
MONOCYTES # BLD AUTO: 0.4 K/UL (ref 0.3–1)
MONOCYTES NFR BLD: 7.2 % (ref 4–15)
NEUTROPHILS # BLD AUTO: 3.2 K/UL (ref 1.8–7.7)
NEUTROPHILS NFR BLD: 53.2 % (ref 38–73)
NRBC BLD-RTO: 0 /100 WBC
PLATELET # BLD AUTO: 355 K/UL (ref 150–450)
PMV BLD AUTO: 9.9 FL (ref 9.2–12.9)
POTASSIUM SERPL-SCNC: 4.3 MMOL/L (ref 3.5–5.1)
PROT SERPL-MCNC: 6.3 G/DL (ref 6–8.4)
RBC # BLD AUTO: 3.81 M/UL (ref 4.6–6.2)
SATURATED IRON: 31 % (ref 20–50)
SODIUM SERPL-SCNC: 141 MMOL/L (ref 136–145)
TOTAL IRON BINDING CAPACITY: 336 UG/DL (ref 250–450)
TRANSFERRIN SERPL-MCNC: 227 MG/DL (ref 200–375)
WBC # BLD AUTO: 6.01 K/UL (ref 3.9–12.7)

## 2021-05-17 PROCEDURE — 82728 ASSAY OF FERRITIN: CPT | Performed by: INTERNAL MEDICINE

## 2021-05-17 PROCEDURE — 83540 ASSAY OF IRON: CPT | Performed by: INTERNAL MEDICINE

## 2021-05-17 PROCEDURE — 84153 ASSAY OF PSA TOTAL: CPT | Performed by: UROLOGY

## 2021-05-17 PROCEDURE — 80053 COMPREHEN METABOLIC PANEL: CPT | Performed by: INTERNAL MEDICINE

## 2021-05-17 PROCEDURE — 85025 COMPLETE CBC W/AUTO DIFF WBC: CPT | Performed by: INTERNAL MEDICINE

## 2021-05-17 PROCEDURE — 36415 COLL VENOUS BLD VENIPUNCTURE: CPT | Mod: PO | Performed by: UROLOGY

## 2021-05-18 ENCOUNTER — TELEPHONE (OUTPATIENT)
Dept: INTERNAL MEDICINE | Facility: CLINIC | Age: 71
End: 2021-05-18

## 2021-05-18 DIAGNOSIS — D64.9 ANEMIA, UNSPECIFIED TYPE: Primary | ICD-10-CM

## 2021-05-20 ENCOUNTER — OFFICE VISIT (OUTPATIENT)
Dept: UROLOGY | Facility: CLINIC | Age: 71
End: 2021-05-20
Payer: MEDICARE

## 2021-05-20 VITALS
WEIGHT: 196 LBS | HEART RATE: 40 BPM | BODY MASS INDEX: 24.37 KG/M2 | DIASTOLIC BLOOD PRESSURE: 79 MMHG | HEIGHT: 75 IN | SYSTOLIC BLOOD PRESSURE: 157 MMHG

## 2021-05-20 DIAGNOSIS — C61 PROSTATE CANCER: Primary | ICD-10-CM

## 2021-05-20 DIAGNOSIS — N52.31 ERECTILE DYSFUNCTION FOLLOWING RADICAL PROSTATECTOMY: Primary | ICD-10-CM

## 2021-05-20 DIAGNOSIS — I10 ESSENTIAL HYPERTENSION: ICD-10-CM

## 2021-05-20 DIAGNOSIS — C61 PROSTATE CANCER: ICD-10-CM

## 2021-05-20 DIAGNOSIS — E78.5 HYPERLIPIDEMIA, UNSPECIFIED HYPERLIPIDEMIA TYPE: ICD-10-CM

## 2021-05-20 PROCEDURE — 99999 PR PBB SHADOW E&M-EST. PATIENT-LVL I: CPT | Mod: PBBFAC,,, | Performed by: UROLOGY

## 2021-05-20 PROCEDURE — 99214 OFFICE O/P EST MOD 30 MIN: CPT | Mod: PBBFAC | Performed by: UROLOGY

## 2021-05-20 PROCEDURE — 99999 PR PBB SHADOW E&M-EST. PATIENT-LVL IV: CPT | Mod: PBBFAC,,, | Performed by: UROLOGY

## 2021-05-20 PROCEDURE — 99211 OFF/OP EST MAY X REQ PHY/QHP: CPT | Mod: PBBFAC,27 | Performed by: UROLOGY

## 2021-05-20 PROCEDURE — 99999 PR PBB SHADOW E&M-EST. PATIENT-LVL I: ICD-10-PCS | Mod: PBBFAC,,, | Performed by: UROLOGY

## 2021-05-20 PROCEDURE — 99999 PR PBB SHADOW E&M-EST. PATIENT-LVL IV: ICD-10-PCS | Mod: PBBFAC,,, | Performed by: UROLOGY

## 2021-05-20 PROCEDURE — 99214 PR OFFICE/OUTPT VISIT, EST, LEVL IV, 30-39 MIN: ICD-10-PCS | Mod: S$PBB,,, | Performed by: UROLOGY

## 2021-05-20 PROCEDURE — 99214 OFFICE O/P EST MOD 30 MIN: CPT | Mod: S$PBB,,, | Performed by: UROLOGY

## 2021-05-20 PROCEDURE — 99024 POSTOP FOLLOW-UP VISIT: CPT | Mod: S$PBB,,, | Performed by: UROLOGY

## 2021-05-20 PROCEDURE — 99024 PR POST-OP FOLLOW-UP VISIT: ICD-10-PCS | Mod: S$PBB,,, | Performed by: UROLOGY

## 2021-05-20 RX ORDER — PAPAVERINE HYDROCHLORIDE 30 MG/ML
INJECTION INTRAMUSCULAR; INTRAVENOUS
Qty: 5 ML | Refills: 0 | Status: SHIPPED | OUTPATIENT
Start: 2021-05-20 | End: 2021-06-22 | Stop reason: ALTCHOICE

## 2021-06-21 ENCOUNTER — LAB VISIT (OUTPATIENT)
Dept: LAB | Facility: HOSPITAL | Age: 71
End: 2021-06-21
Attending: INTERNAL MEDICINE
Payer: MEDICARE

## 2021-06-21 DIAGNOSIS — D64.9 ANEMIA, UNSPECIFIED TYPE: ICD-10-CM

## 2021-06-21 LAB
BASOPHILS # BLD AUTO: 0.02 K/UL (ref 0–0.2)
BASOPHILS NFR BLD: 0.3 % (ref 0–1.9)
DIFFERENTIAL METHOD: ABNORMAL
EOSINOPHIL # BLD AUTO: 0.2 K/UL (ref 0–0.5)
EOSINOPHIL NFR BLD: 2.3 % (ref 0–8)
ERYTHROCYTE [DISTWIDTH] IN BLOOD BY AUTOMATED COUNT: 14.2 % (ref 11.5–14.5)
HCT VFR BLD AUTO: 39.2 % (ref 40–54)
HGB BLD-MCNC: 12.3 G/DL (ref 14–18)
IMM GRANULOCYTES # BLD AUTO: 0.03 K/UL (ref 0–0.04)
IMM GRANULOCYTES NFR BLD AUTO: 0.4 % (ref 0–0.5)
LYMPHOCYTES # BLD AUTO: 2.9 K/UL (ref 1–4.8)
LYMPHOCYTES NFR BLD: 40.2 % (ref 18–48)
MCH RBC QN AUTO: 29.9 PG (ref 27–31)
MCHC RBC AUTO-ENTMCNC: 31.4 G/DL (ref 32–36)
MCV RBC AUTO: 95 FL (ref 82–98)
MONOCYTES # BLD AUTO: 0.5 K/UL (ref 0.3–1)
MONOCYTES NFR BLD: 7.4 % (ref 4–15)
NEUTROPHILS # BLD AUTO: 3.6 K/UL (ref 1.8–7.7)
NEUTROPHILS NFR BLD: 49.4 % (ref 38–73)
NRBC BLD-RTO: 0 /100 WBC
PLATELET # BLD AUTO: 355 K/UL (ref 150–450)
PMV BLD AUTO: 9.8 FL (ref 9.2–12.9)
RBC # BLD AUTO: 4.12 M/UL (ref 4.6–6.2)
WBC # BLD AUTO: 7.26 K/UL (ref 3.9–12.7)

## 2021-06-21 PROCEDURE — 36415 COLL VENOUS BLD VENIPUNCTURE: CPT | Mod: PO | Performed by: INTERNAL MEDICINE

## 2021-06-21 PROCEDURE — 85025 COMPLETE CBC W/AUTO DIFF WBC: CPT | Performed by: INTERNAL MEDICINE

## 2021-06-22 ENCOUNTER — OFFICE VISIT (OUTPATIENT)
Dept: UROLOGY | Facility: CLINIC | Age: 71
End: 2021-06-22
Payer: MEDICARE

## 2021-06-22 VITALS
HEART RATE: 44 BPM | WEIGHT: 198 LBS | SYSTOLIC BLOOD PRESSURE: 171 MMHG | HEIGHT: 74 IN | BODY MASS INDEX: 25.41 KG/M2 | DIASTOLIC BLOOD PRESSURE: 71 MMHG

## 2021-06-22 DIAGNOSIS — C61 PROSTATE CANCER: Primary | ICD-10-CM

## 2021-06-22 DIAGNOSIS — N52.31 ERECTILE DYSFUNCTION AFTER RADICAL PROSTATECTOMY: ICD-10-CM

## 2021-06-22 DIAGNOSIS — Z90.79 HISTORY OF ROBOT-ASSISTED LAPAROSCOPIC RADICAL PROSTATECTOMY: ICD-10-CM

## 2021-06-22 PROCEDURE — 99999 PR PBB SHADOW E&M-EST. PATIENT-LVL IV: ICD-10-PCS | Mod: PBBFAC,,, | Performed by: NURSE PRACTITIONER

## 2021-06-22 PROCEDURE — 99215 PR OFFICE/OUTPT VISIT, EST, LEVL V, 40-54 MIN: ICD-10-PCS | Mod: S$PBB,,, | Performed by: NURSE PRACTITIONER

## 2021-06-22 PROCEDURE — 99215 OFFICE O/P EST HI 40 MIN: CPT | Mod: S$PBB,,, | Performed by: NURSE PRACTITIONER

## 2021-06-22 PROCEDURE — 99999 PR PBB SHADOW E&M-EST. PATIENT-LVL IV: CPT | Mod: PBBFAC,,, | Performed by: NURSE PRACTITIONER

## 2021-06-22 PROCEDURE — 99214 OFFICE O/P EST MOD 30 MIN: CPT | Mod: PBBFAC | Performed by: NURSE PRACTITIONER

## 2021-06-22 RX ORDER — PAPAVERINE HYDROCHLORIDE 30 MG/ML
INJECTION INTRAMUSCULAR; INTRAVENOUS
Qty: 5 ML | Refills: 12 | Status: SHIPPED | OUTPATIENT
Start: 2021-06-22 | End: 2021-11-22 | Stop reason: SDUPTHER

## 2021-07-13 ENCOUNTER — PATIENT MESSAGE (OUTPATIENT)
Dept: UROLOGY | Facility: CLINIC | Age: 71
End: 2021-07-13

## 2021-07-13 DIAGNOSIS — N52.31 ERECTILE DYSFUNCTION FOLLOWING RADICAL PROSTATECTOMY: Primary | ICD-10-CM

## 2021-07-13 DIAGNOSIS — C61 PROSTATE CANCER: ICD-10-CM

## 2021-07-14 RX ORDER — TADALAFIL 5 MG/1
5 TABLET ORAL DAILY
Qty: 90 TABLET | Refills: 3 | Status: SHIPPED | OUTPATIENT
Start: 2021-07-14 | End: 2021-11-22 | Stop reason: SDUPTHER

## 2021-08-17 ENCOUNTER — PATIENT OUTREACH (OUTPATIENT)
Dept: ADMINISTRATIVE | Facility: OTHER | Age: 71
End: 2021-08-17

## 2021-08-17 DIAGNOSIS — M25.552 LEFT HIP PAIN: Primary | ICD-10-CM

## 2021-08-18 ENCOUNTER — HOSPITAL ENCOUNTER (OUTPATIENT)
Dept: RADIOLOGY | Facility: HOSPITAL | Age: 71
Discharge: HOME | End: 2021-08-18
Attending: ORTHOPAEDIC SURGERY
Payer: MEDICARE

## 2021-08-18 ENCOUNTER — OFFICE VISIT (OUTPATIENT)
Dept: ORTHOPEDICS | Facility: CLINIC | Age: 71
End: 2021-08-18
Payer: MEDICARE

## 2021-08-18 VITALS — HEIGHT: 74 IN | WEIGHT: 194.75 LBS | BODY MASS INDEX: 24.99 KG/M2

## 2021-08-18 DIAGNOSIS — T84.84XA HIP PAIN DUE TO RECALLED HIP ARTHROPLASTY HARDWARE, INITIAL ENCOUNTER: Primary | ICD-10-CM

## 2021-08-18 DIAGNOSIS — Z96.649 HIP PAIN DUE TO RECALLED HIP ARTHROPLASTY HARDWARE, INITIAL ENCOUNTER: ICD-10-CM

## 2021-08-18 DIAGNOSIS — M25.552 LEFT HIP PAIN: ICD-10-CM

## 2021-08-18 DIAGNOSIS — Z96.649 HIP PAIN DUE TO RECALLED HIP ARTHROPLASTY HARDWARE, INITIAL ENCOUNTER: Primary | ICD-10-CM

## 2021-08-18 DIAGNOSIS — T84.84XA HIP PAIN DUE TO RECALLED HIP ARTHROPLASTY HARDWARE, INITIAL ENCOUNTER: ICD-10-CM

## 2021-08-18 PROCEDURE — 73721 MRI JNT OF LWR EXTRE W/O DYE: CPT | Mod: 26,LT,, | Performed by: RADIOLOGY

## 2021-08-18 PROCEDURE — 99213 OFFICE O/P EST LOW 20 MIN: CPT | Mod: PBBFAC | Performed by: ORTHOPAEDIC SURGERY

## 2021-08-18 PROCEDURE — 73721 MRI JNT OF LWR EXTRE W/O DYE: CPT | Mod: TC,LT

## 2021-08-18 PROCEDURE — 99999 PR PBB SHADOW E&M-EST. PATIENT-LVL III: ICD-10-PCS | Mod: PBBFAC,,, | Performed by: ORTHOPAEDIC SURGERY

## 2021-08-18 PROCEDURE — 73502 X-RAY EXAM HIP UNI 2-3 VIEWS: CPT | Mod: 26,LT,, | Performed by: RADIOLOGY

## 2021-08-18 PROCEDURE — 99999 PR PBB SHADOW E&M-EST. PATIENT-LVL III: CPT | Mod: PBBFAC,,, | Performed by: ORTHOPAEDIC SURGERY

## 2021-08-18 PROCEDURE — 73502 XR HIP WITH PELVIS WHEN PERFORMED, 2 OR 3 VIEWS LEFT: ICD-10-PCS | Mod: 26,LT,, | Performed by: RADIOLOGY

## 2021-08-18 PROCEDURE — 99203 OFFICE O/P NEW LOW 30 MIN: CPT | Mod: S$PBB,,, | Performed by: ORTHOPAEDIC SURGERY

## 2021-08-18 PROCEDURE — 73721 MRI HIP WITHOUT CONTRAST LEFT: ICD-10-PCS | Mod: 26,LT,, | Performed by: RADIOLOGY

## 2021-08-18 PROCEDURE — 73502 X-RAY EXAM HIP UNI 2-3 VIEWS: CPT | Mod: TC,LT

## 2021-08-18 PROCEDURE — 99203 PR OFFICE/OUTPT VISIT, NEW, LEVL III, 30-44 MIN: ICD-10-PCS | Mod: S$PBB,,, | Performed by: ORTHOPAEDIC SURGERY

## 2021-08-24 ENCOUNTER — TELEPHONE (OUTPATIENT)
Dept: ORTHOPEDICS | Facility: CLINIC | Age: 71
End: 2021-08-24

## 2021-08-24 DIAGNOSIS — Z01.818 PRE-OP TESTING: Primary | ICD-10-CM

## 2021-08-25 ENCOUNTER — HOSPITAL ENCOUNTER (OUTPATIENT)
Dept: RADIOLOGY | Facility: HOSPITAL | Age: 71
Discharge: HOME | End: 2021-08-25
Attending: NURSE PRACTITIONER
Payer: MEDICARE

## 2021-08-25 ENCOUNTER — OFFICE VISIT (OUTPATIENT)
Dept: ORTHOPEDICS | Facility: CLINIC | Age: 71
End: 2021-08-25
Payer: MEDICARE

## 2021-08-25 ENCOUNTER — PATIENT MESSAGE (OUTPATIENT)
Dept: ADMINISTRATIVE | Facility: OTHER | Age: 71
End: 2021-08-25

## 2021-08-25 VITALS
WEIGHT: 193 LBS | HEIGHT: 74 IN | BODY MASS INDEX: 24.88 KG/M2 | BODY MASS INDEX: 24.77 KG/M2 | WEIGHT: 193.88 LBS | HEIGHT: 74 IN

## 2021-08-25 DIAGNOSIS — T84.84XA HIP PAIN DUE TO RECALLED HIP ARTHROPLASTY HARDWARE, INITIAL ENCOUNTER: ICD-10-CM

## 2021-08-25 DIAGNOSIS — M25.551 RIGHT HIP PAIN: Primary | ICD-10-CM

## 2021-08-25 DIAGNOSIS — Z96.649 HIP PAIN DUE TO RECALLED HIP ARTHROPLASTY HARDWARE, INITIAL ENCOUNTER: ICD-10-CM

## 2021-08-25 DIAGNOSIS — T84.84XA HIP PAIN DUE TO RECALLED HIP ARTHROPLASTY HARDWARE, INITIAL ENCOUNTER: Primary | ICD-10-CM

## 2021-08-25 DIAGNOSIS — T84.019D PROSTHETIC JOINT IMPLANT FAILURE, SUBSEQUENT ENCOUNTER: ICD-10-CM

## 2021-08-25 DIAGNOSIS — Z96.649 HIP PAIN DUE TO RECALLED HIP ARTHROPLASTY HARDWARE, INITIAL ENCOUNTER: Primary | ICD-10-CM

## 2021-08-25 DIAGNOSIS — T84.011S FAILURE OF LEFT TOTAL HIP ARTHROPLASTY, SEQUELA: ICD-10-CM

## 2021-08-25 DIAGNOSIS — M25.551 RIGHT HIP PAIN: ICD-10-CM

## 2021-08-25 DIAGNOSIS — T84.019D PROSTHETIC JOINT IMPLANT FAILURE, SUBSEQUENT ENCOUNTER: Primary | ICD-10-CM

## 2021-08-25 PROCEDURE — 99214 OFFICE O/P EST MOD 30 MIN: CPT | Mod: S$PBB,,, | Performed by: ORTHOPAEDIC SURGERY

## 2021-08-25 PROCEDURE — 73502 X-RAY EXAM HIP UNI 2-3 VIEWS: CPT | Mod: TC,LT

## 2021-08-25 PROCEDURE — 99214 PR OFFICE/OUTPT VISIT, EST, LEVL IV, 30-39 MIN: ICD-10-PCS | Mod: S$PBB,,, | Performed by: ORTHOPAEDIC SURGERY

## 2021-08-25 PROCEDURE — 99999 PR PBB SHADOW E&M-EST. PATIENT-LVL III: ICD-10-PCS | Mod: PBBFAC,,, | Performed by: NURSE PRACTITIONER

## 2021-08-25 PROCEDURE — 73502 X-RAY EXAM HIP UNI 2-3 VIEWS: CPT | Mod: 26,LT,, | Performed by: RADIOLOGY

## 2021-08-25 PROCEDURE — 99999 PR PBB SHADOW E&M-EST. PATIENT-LVL III: CPT | Mod: PBBFAC,,, | Performed by: NURSE PRACTITIONER

## 2021-08-25 PROCEDURE — 99499 UNLISTED E&M SERVICE: CPT | Mod: S$PBB,,, | Performed by: NURSE PRACTITIONER

## 2021-08-25 PROCEDURE — 99999 PR PBB SHADOW E&M-EST. PATIENT-LVL III: ICD-10-PCS | Mod: PBBFAC,,, | Performed by: ORTHOPAEDIC SURGERY

## 2021-08-25 PROCEDURE — 99999 PR PBB SHADOW E&M-EST. PATIENT-LVL III: CPT | Mod: PBBFAC,,, | Performed by: ORTHOPAEDIC SURGERY

## 2021-08-25 PROCEDURE — 99499 NO LOS: ICD-10-PCS | Mod: S$PBB,,, | Performed by: NURSE PRACTITIONER

## 2021-08-25 PROCEDURE — 99213 OFFICE O/P EST LOW 20 MIN: CPT | Mod: PBBFAC | Performed by: ORTHOPAEDIC SURGERY

## 2021-08-25 PROCEDURE — 73502 XR HIP WITH PELVIS WHEN PERFORMED, 2 OR 3 VIEWS LEFT: ICD-10-PCS | Mod: 26,LT,, | Performed by: RADIOLOGY

## 2021-08-25 PROCEDURE — 99213 OFFICE O/P EST LOW 20 MIN: CPT | Mod: PBBFAC,27 | Performed by: NURSE PRACTITIONER

## 2021-08-28 RX ORDER — SODIUM CHLORIDE 9 MG/ML
INJECTION, SOLUTION INTRAVENOUS CONTINUOUS
Status: CANCELLED | OUTPATIENT
Start: 2021-08-28

## 2021-08-28 RX ORDER — MUPIROCIN 20 MG/G
OINTMENT TOPICAL
Status: CANCELLED | OUTPATIENT
Start: 2021-08-28

## 2021-08-28 RX ORDER — CEFAZOLIN SODIUM 2 G/50ML
2 SOLUTION INTRAVENOUS
Status: CANCELLED | OUTPATIENT
Start: 2021-08-28

## 2021-09-13 ENCOUNTER — PATIENT MESSAGE (OUTPATIENT)
Dept: SURGERY | Facility: HOSPITAL | Age: 71
End: 2021-09-13

## 2021-09-13 ENCOUNTER — TELEPHONE (OUTPATIENT)
Dept: PREADMISSION TESTING | Facility: HOSPITAL | Age: 71
End: 2021-09-13

## 2021-09-13 DIAGNOSIS — M79.609 PAIN IN EXTREMITY, UNSPECIFIED EXTREMITY: ICD-10-CM

## 2021-09-13 DIAGNOSIS — Z01.818 PRE-OP TESTING: Primary | ICD-10-CM

## 2021-09-17 DIAGNOSIS — T84.019D PROSTHETIC JOINT IMPLANT FAILURE, SUBSEQUENT ENCOUNTER: Primary | ICD-10-CM

## 2021-09-17 RX ORDER — OXYCODONE HYDROCHLORIDE 5 MG/1
TABLET ORAL
Qty: 30 TABLET | Refills: 0 | Status: SHIPPED | OUTPATIENT
Start: 2021-09-17 | End: 2021-10-04 | Stop reason: SDUPTHER

## 2021-09-17 RX ORDER — CELECOXIB 200 MG/1
200 CAPSULE ORAL DAILY
Qty: 30 CAPSULE | Refills: 0 | Status: SHIPPED | OUTPATIENT
Start: 2021-09-17 | End: 2021-10-22

## 2021-09-17 RX ORDER — DEXTROMETHORPHAN HYDROBROMIDE, GUAIFENESIN 5; 100 MG/5ML; MG/5ML
650 LIQUID ORAL
Qty: 120 TABLET | Refills: 0 | Status: SHIPPED | OUTPATIENT
Start: 2021-09-17 | End: 2021-11-08

## 2021-09-17 RX ORDER — DOCUSATE SODIUM 100 MG/1
100 CAPSULE, LIQUID FILLED ORAL 2 TIMES DAILY PRN
Qty: 60 CAPSULE | Refills: 0 | Status: SHIPPED | OUTPATIENT
Start: 2021-09-17 | End: 2021-11-08

## 2021-09-17 RX ORDER — ASPIRIN 81 MG/1
81 TABLET ORAL 2 TIMES DAILY
Qty: 60 TABLET | Refills: 0 | Status: SHIPPED | OUTPATIENT
Start: 2021-09-17 | End: 2022-07-20

## 2021-09-17 RX ORDER — METHOCARBAMOL 750 MG/1
750 TABLET, FILM COATED ORAL 3 TIMES DAILY PRN
Qty: 30 TABLET | Refills: 0 | Status: SHIPPED | OUTPATIENT
Start: 2021-09-17 | End: 2021-11-08

## 2021-09-19 ENCOUNTER — LAB VISIT (OUTPATIENT)
Dept: SURGERY | Facility: CLINIC | Age: 71
DRG: 468 | End: 2021-09-19
Payer: MEDICARE

## 2021-09-19 DIAGNOSIS — Z01.818 PRE-OP TESTING: ICD-10-CM

## 2021-09-19 LAB
SARS-COV-2 RNA RESP QL NAA+PROBE: NOT DETECTED
SARS-COV-2- CYCLE NUMBER: NORMAL

## 2021-09-19 PROCEDURE — U0003 INFECTIOUS AGENT DETECTION BY NUCLEIC ACID (DNA OR RNA); SEVERE ACUTE RESPIRATORY SYNDROME CORONAVIRUS 2 (SARS-COV-2) (CORONAVIRUS DISEASE [COVID-19]), AMPLIFIED PROBE TECHNIQUE, MAKING USE OF HIGH THROUGHPUT TECHNOLOGIES AS DESCRIBED BY CMS-2020-01-R: HCPCS | Performed by: ORTHOPAEDIC SURGERY

## 2021-09-19 PROCEDURE — U0005 INFEC AGEN DETEC AMPLI PROBE: HCPCS | Performed by: ORTHOPAEDIC SURGERY

## 2021-09-20 ENCOUNTER — ANESTHESIA EVENT (OUTPATIENT)
Dept: SURGERY | Facility: HOSPITAL | Age: 71
DRG: 468 | End: 2021-09-20
Payer: MEDICARE

## 2021-09-20 ENCOUNTER — HOSPITAL ENCOUNTER (OUTPATIENT)
Dept: CARDIOLOGY | Facility: CLINIC | Age: 71
Discharge: HOME | DRG: 468 | End: 2021-09-20
Payer: MEDICARE

## 2021-09-20 ENCOUNTER — OFFICE VISIT (OUTPATIENT)
Dept: INTERNAL MEDICINE | Facility: CLINIC | Age: 71
DRG: 468 | End: 2021-09-20
Payer: MEDICARE

## 2021-09-20 ENCOUNTER — TELEPHONE (OUTPATIENT)
Dept: ORTHOPEDICS | Facility: CLINIC | Age: 71
End: 2021-09-20

## 2021-09-20 ENCOUNTER — HOSPITAL ENCOUNTER (OUTPATIENT)
Dept: PREADMISSION TESTING | Facility: HOSPITAL | Age: 71
Discharge: HOME | DRG: 468 | End: 2021-09-20
Attending: ORTHOPAEDIC SURGERY
Payer: MEDICARE

## 2021-09-20 VITALS
OXYGEN SATURATION: 98 % | BODY MASS INDEX: 26.06 KG/M2 | TEMPERATURE: 98 F | HEART RATE: 51 BPM | HEIGHT: 72 IN | WEIGHT: 192.38 LBS | DIASTOLIC BLOOD PRESSURE: 88 MMHG | SYSTOLIC BLOOD PRESSURE: 140 MMHG

## 2021-09-20 DIAGNOSIS — E78.5 HYPERLIPIDEMIA, UNSPECIFIED HYPERLIPIDEMIA TYPE: ICD-10-CM

## 2021-09-20 DIAGNOSIS — Z01.818 PREOP EXAMINATION: Primary | ICD-10-CM

## 2021-09-20 DIAGNOSIS — I10 ESSENTIAL HYPERTENSION: ICD-10-CM

## 2021-09-20 DIAGNOSIS — K21.9 GASTROESOPHAGEAL REFLUX DISEASE, UNSPECIFIED WHETHER ESOPHAGITIS PRESENT: ICD-10-CM

## 2021-09-20 DIAGNOSIS — R00.1 SINUS BRADYCARDIA: ICD-10-CM

## 2021-09-20 DIAGNOSIS — T84.019D PROSTHETIC JOINT IMPLANT FAILURE, SUBSEQUENT ENCOUNTER: Primary | ICD-10-CM

## 2021-09-20 DIAGNOSIS — E07.9 THYROID CONDITION: ICD-10-CM

## 2021-09-20 DIAGNOSIS — C44.310 BASAL CELL CARCINOMA, FACE: ICD-10-CM

## 2021-09-20 DIAGNOSIS — M65.341 TRIGGER RING FINGER OF RIGHT HAND: ICD-10-CM

## 2021-09-20 DIAGNOSIS — D64.9 ANEMIA, UNSPECIFIED TYPE: Chronic | ICD-10-CM

## 2021-09-20 DIAGNOSIS — C61 PROSTATE CANCER: ICD-10-CM

## 2021-09-20 DIAGNOSIS — Z01.818 PRE-OP TESTING: ICD-10-CM

## 2021-09-20 PROCEDURE — 99999 PR PBB SHADOW E&M-EST. PATIENT-LVL III: ICD-10-PCS | Mod: PBBFAC,,, | Performed by: HOSPITALIST

## 2021-09-20 PROCEDURE — 99999 PR PBB SHADOW E&M-EST. PATIENT-LVL III: CPT | Mod: PBBFAC,,, | Performed by: HOSPITALIST

## 2021-09-20 PROCEDURE — 93010 ELECTROCARDIOGRAM REPORT: CPT | Mod: S$PBB,,, | Performed by: INTERNAL MEDICINE

## 2021-09-20 PROCEDURE — 86920 COMPATIBILITY TEST SPIN: CPT | Performed by: PHYSICIAN ASSISTANT

## 2021-09-20 PROCEDURE — 99214 PR OFFICE/OUTPT VISIT, EST, LEVL IV, 30-39 MIN: ICD-10-PCS | Mod: S$PBB,,, | Performed by: HOSPITALIST

## 2021-09-20 PROCEDURE — 99213 OFFICE O/P EST LOW 20 MIN: CPT | Mod: PBBFAC | Performed by: HOSPITALIST

## 2021-09-20 PROCEDURE — 99214 OFFICE O/P EST MOD 30 MIN: CPT | Mod: S$PBB,,, | Performed by: HOSPITALIST

## 2021-09-20 PROCEDURE — 93010 EKG 12-LEAD: ICD-10-PCS | Mod: S$PBB,,, | Performed by: INTERNAL MEDICINE

## 2021-09-20 PROCEDURE — 93005 ELECTROCARDIOGRAM TRACING: CPT | Mod: PBBFAC | Performed by: INTERNAL MEDICINE

## 2021-09-21 ENCOUNTER — PATIENT MESSAGE (OUTPATIENT)
Dept: ADMINISTRATIVE | Facility: OTHER | Age: 71
End: 2021-09-21

## 2021-09-21 ENCOUNTER — ANESTHESIA (OUTPATIENT)
Dept: SURGERY | Facility: HOSPITAL | Age: 71
DRG: 468 | End: 2021-09-21
Payer: MEDICARE

## 2021-09-21 ENCOUNTER — HOSPITAL ENCOUNTER (INPATIENT)
Facility: HOSPITAL | Age: 71
LOS: 1 days | Discharge: HOME | DRG: 468 | End: 2021-09-22
Attending: ORTHOPAEDIC SURGERY | Admitting: ORTHOPAEDIC SURGERY
Payer: MEDICARE

## 2021-09-21 DIAGNOSIS — Z01.818 PRE-OP TESTING: ICD-10-CM

## 2021-09-21 DIAGNOSIS — T84.019D PROSTHETIC JOINT IMPLANT FAILURE, SUBSEQUENT ENCOUNTER: Primary | ICD-10-CM

## 2021-09-21 PROBLEM — T84.019A PROSTHETIC JOINT IMPLANT FAILURE: Status: ACTIVE | Noted: 2021-09-21

## 2021-09-21 PROCEDURE — 36000711: Performed by: ORTHOPAEDIC SURGERY

## 2021-09-21 PROCEDURE — 63600175 PHARM REV CODE 636 W HCPCS: Performed by: NURSE ANESTHETIST, CERTIFIED REGISTERED

## 2021-09-21 PROCEDURE — 25000003 PHARM REV CODE 250: Performed by: ANESTHESIOLOGY

## 2021-09-21 PROCEDURE — 88311 DECALCIFY TISSUE: CPT | Performed by: STUDENT IN AN ORGANIZED HEALTH CARE EDUCATION/TRAINING PROGRAM

## 2021-09-21 PROCEDURE — 25000003 PHARM REV CODE 250: Performed by: NURSE PRACTITIONER

## 2021-09-21 PROCEDURE — 88304 TISSUE EXAM BY PATHOLOGIST: CPT | Performed by: STUDENT IN AN ORGANIZED HEALTH CARE EDUCATION/TRAINING PROGRAM

## 2021-09-21 PROCEDURE — 88307 TISSUE EXAM BY PATHOLOGIST: CPT | Mod: 26,,, | Performed by: STUDENT IN AN ORGANIZED HEALTH CARE EDUCATION/TRAINING PROGRAM

## 2021-09-21 PROCEDURE — 27134 REVISE HIP JOINT REPLACEMENT: CPT | Mod: LT,GC,, | Performed by: ORTHOPAEDIC SURGERY

## 2021-09-21 PROCEDURE — 25000003 PHARM REV CODE 250: Performed by: STUDENT IN AN ORGANIZED HEALTH CARE EDUCATION/TRAINING PROGRAM

## 2021-09-21 PROCEDURE — 88300 SURGICAL PATH GROSS: CPT | Mod: 59 | Performed by: STUDENT IN AN ORGANIZED HEALTH CARE EDUCATION/TRAINING PROGRAM

## 2021-09-21 PROCEDURE — 99900035 HC TECH TIME PER 15 MIN (STAT)

## 2021-09-21 PROCEDURE — 11000001 HC ACUTE MED/SURG PRIVATE ROOM

## 2021-09-21 PROCEDURE — 87102 FUNGUS ISOLATION CULTURE: CPT | Performed by: ORTHOPAEDIC SURGERY

## 2021-09-21 PROCEDURE — 71000033 HC RECOVERY, INTIAL HOUR: Performed by: ORTHOPAEDIC SURGERY

## 2021-09-21 PROCEDURE — 63600175 PHARM REV CODE 636 W HCPCS: Performed by: STUDENT IN AN ORGANIZED HEALTH CARE EDUCATION/TRAINING PROGRAM

## 2021-09-21 PROCEDURE — 87205 SMEAR GRAM STAIN: CPT | Performed by: ORTHOPAEDIC SURGERY

## 2021-09-21 PROCEDURE — P9016 RBC LEUKOCYTES REDUCED: HCPCS | Performed by: PHYSICIAN ASSISTANT

## 2021-09-21 PROCEDURE — 37000009 HC ANESTHESIA EA ADD 15 MINS: Performed by: ORTHOPAEDIC SURGERY

## 2021-09-21 PROCEDURE — C1713 ANCHOR/SCREW BN/BN,TIS/BN: HCPCS | Performed by: ORTHOPAEDIC SURGERY

## 2021-09-21 PROCEDURE — 25000003 PHARM REV CODE 250: Performed by: NURSE ANESTHETIST, CERTIFIED REGISTERED

## 2021-09-21 PROCEDURE — 87075 CULTR BACTERIA EXCEPT BLOOD: CPT | Mod: 59 | Performed by: ORTHOPAEDIC SURGERY

## 2021-09-21 PROCEDURE — 88305 TISSUE EXAM BY PATHOLOGIST: CPT | Mod: 26,,, | Performed by: STUDENT IN AN ORGANIZED HEALTH CARE EDUCATION/TRAINING PROGRAM

## 2021-09-21 PROCEDURE — 94761 N-INVAS EAR/PLS OXIMETRY MLT: CPT

## 2021-09-21 PROCEDURE — D9220A PRA ANESTHESIA: ICD-10-PCS | Mod: CRNA,,, | Performed by: NURSE ANESTHETIST, CERTIFIED REGISTERED

## 2021-09-21 PROCEDURE — 37000008 HC ANESTHESIA 1ST 15 MINUTES: Performed by: ORTHOPAEDIC SURGERY

## 2021-09-21 PROCEDURE — 88304 PR  SURG PATH,LEVEL III: ICD-10-PCS | Mod: 26,,, | Performed by: STUDENT IN AN ORGANIZED HEALTH CARE EDUCATION/TRAINING PROGRAM

## 2021-09-21 PROCEDURE — 64450 PENG SINGLE INJECTION BLOCK: ICD-10-PCS | Mod: 59,LT,, | Performed by: ANESTHESIOLOGY

## 2021-09-21 PROCEDURE — 64450 NJX AA&/STRD OTHER PN/BRANCH: CPT | Performed by: STUDENT IN AN ORGANIZED HEALTH CARE EDUCATION/TRAINING PROGRAM

## 2021-09-21 PROCEDURE — 88300 SURGICAL PATH GROSS: CPT | Mod: 26,59,, | Performed by: STUDENT IN AN ORGANIZED HEALTH CARE EDUCATION/TRAINING PROGRAM

## 2021-09-21 PROCEDURE — 88304 TISSUE EXAM BY PATHOLOGIST: CPT | Mod: 26,,, | Performed by: STUDENT IN AN ORGANIZED HEALTH CARE EDUCATION/TRAINING PROGRAM

## 2021-09-21 PROCEDURE — 87070 CULTURE OTHR SPECIMN AEROBIC: CPT | Performed by: ORTHOPAEDIC SURGERY

## 2021-09-21 PROCEDURE — C1776 JOINT DEVICE (IMPLANTABLE): HCPCS | Performed by: ORTHOPAEDIC SURGERY

## 2021-09-21 PROCEDURE — 76942 ECHO GUIDE FOR BIOPSY: CPT | Mod: 26,,, | Performed by: ANESTHESIOLOGY

## 2021-09-21 PROCEDURE — 88305 TISSUE EXAM BY PATHOLOGIST: CPT | Mod: 59 | Performed by: STUDENT IN AN ORGANIZED HEALTH CARE EDUCATION/TRAINING PROGRAM

## 2021-09-21 PROCEDURE — 63600175 PHARM REV CODE 636 W HCPCS: Performed by: ANESTHESIOLOGY

## 2021-09-21 PROCEDURE — 97116 GAIT TRAINING THERAPY: CPT

## 2021-09-21 PROCEDURE — 88307 PR  SURG PATH,LEVEL V: ICD-10-PCS | Mod: 26,,, | Performed by: STUDENT IN AN ORGANIZED HEALTH CARE EDUCATION/TRAINING PROGRAM

## 2021-09-21 PROCEDURE — 97161 PT EVAL LOW COMPLEX 20 MIN: CPT

## 2021-09-21 PROCEDURE — 63600175 PHARM REV CODE 636 W HCPCS: Performed by: NURSE PRACTITIONER

## 2021-09-21 PROCEDURE — 88305 TISSUE EXAM BY PATHOLOGIST: ICD-10-PCS | Mod: 26,,, | Performed by: STUDENT IN AN ORGANIZED HEALTH CARE EDUCATION/TRAINING PROGRAM

## 2021-09-21 PROCEDURE — 76942 PENG SINGLE INJECTION BLOCK: ICD-10-PCS | Mod: 26,,, | Performed by: ANESTHESIOLOGY

## 2021-09-21 PROCEDURE — D9220A PRA ANESTHESIA: Mod: ANES,,, | Performed by: ANESTHESIOLOGY

## 2021-09-21 PROCEDURE — 27201423 OPTIME MED/SURG SUP & DEVICES STERILE SUPPLY: Performed by: ORTHOPAEDIC SURGERY

## 2021-09-21 PROCEDURE — 88300 PR  SURG PATH,GROSS,LEVEL I: ICD-10-PCS | Mod: 26,59,, | Performed by: STUDENT IN AN ORGANIZED HEALTH CARE EDUCATION/TRAINING PROGRAM

## 2021-09-21 PROCEDURE — 64447 NJX AA&/STRD FEMORAL NRV IMG: CPT | Performed by: STUDENT IN AN ORGANIZED HEALTH CARE EDUCATION/TRAINING PROGRAM

## 2021-09-21 PROCEDURE — 36000710: Performed by: ORTHOPAEDIC SURGERY

## 2021-09-21 PROCEDURE — 27134 PR REVISE TOTAL HIP REPLACEMENT: ICD-10-PCS | Mod: LT,GC,, | Performed by: ORTHOPAEDIC SURGERY

## 2021-09-21 PROCEDURE — D9220A PRA ANESTHESIA: Mod: CRNA,,, | Performed by: NURSE ANESTHETIST, CERTIFIED REGISTERED

## 2021-09-21 PROCEDURE — 71000039 HC RECOVERY, EACH ADD'L HOUR: Performed by: ORTHOPAEDIC SURGERY

## 2021-09-21 PROCEDURE — 51798 US URINE CAPACITY MEASURE: CPT

## 2021-09-21 PROCEDURE — 88307 TISSUE EXAM BY PATHOLOGIST: CPT | Performed by: STUDENT IN AN ORGANIZED HEALTH CARE EDUCATION/TRAINING PROGRAM

## 2021-09-21 PROCEDURE — 87116 MYCOBACTERIA CULTURE: CPT | Performed by: ORTHOPAEDIC SURGERY

## 2021-09-21 PROCEDURE — 87206 SMEAR FLUORESCENT/ACID STAI: CPT | Mod: 91 | Performed by: ORTHOPAEDIC SURGERY

## 2021-09-21 PROCEDURE — D9220A PRA ANESTHESIA: ICD-10-PCS | Mod: ANES,,, | Performed by: ANESTHESIOLOGY

## 2021-09-21 PROCEDURE — 64450 NJX AA&/STRD OTHER PN/BRANCH: CPT | Mod: 59,LT,, | Performed by: ANESTHESIOLOGY

## 2021-09-21 PROCEDURE — 76942 ECHO GUIDE FOR BIOPSY: CPT | Performed by: STUDENT IN AN ORGANIZED HEALTH CARE EDUCATION/TRAINING PROGRAM

## 2021-09-21 DEVICE — SCREW PINNACLE 6.5 X 25: Type: IMPLANTABLE DEVICE | Site: HIP | Status: FUNCTIONAL

## 2021-09-21 RX ORDER — CEFAZOLIN SODIUM 1 G/3ML
2 INJECTION, POWDER, FOR SOLUTION INTRAMUSCULAR; INTRAVENOUS
Status: DISCONTINUED | OUTPATIENT
Start: 2021-09-21 | End: 2021-09-22 | Stop reason: HOSPADM

## 2021-09-21 RX ORDER — CARBOXYMETHYLCELLULOSE SODIUM 5 MG/ML
SOLUTION/ DROPS OPHTHALMIC
Status: DISCONTINUED | OUTPATIENT
Start: 2021-09-21 | End: 2021-09-21

## 2021-09-21 RX ORDER — BUPIVACAINE HYDROCHLORIDE AND EPINEPHRINE 2.5; 5 MG/ML; UG/ML
INJECTION, SOLUTION EPIDURAL; INFILTRATION; INTRACAUDAL; PERINEURAL
Status: COMPLETED | OUTPATIENT
Start: 2021-09-21 | End: 2021-09-21

## 2021-09-21 RX ORDER — DEXAMETHASONE SODIUM PHOSPHATE 4 MG/ML
INJECTION, SOLUTION INTRA-ARTICULAR; INTRALESIONAL; INTRAMUSCULAR; INTRAVENOUS; SOFT TISSUE
Status: DISCONTINUED | OUTPATIENT
Start: 2021-09-21 | End: 2021-09-21

## 2021-09-21 RX ORDER — MIDAZOLAM HYDROCHLORIDE 1 MG/ML
INJECTION, SOLUTION INTRAMUSCULAR; INTRAVENOUS
Status: DISCONTINUED | OUTPATIENT
Start: 2021-09-21 | End: 2021-09-21

## 2021-09-21 RX ORDER — DEXMEDETOMIDINE HYDROCHLORIDE 100 UG/ML
INJECTION, SOLUTION INTRAVENOUS
Status: DISCONTINUED | OUTPATIENT
Start: 2021-09-21 | End: 2021-09-21

## 2021-09-21 RX ORDER — TRANEXAMIC ACID 100 MG/ML
INJECTION, SOLUTION INTRAVENOUS
Status: DISCONTINUED | OUTPATIENT
Start: 2021-09-21 | End: 2021-09-21

## 2021-09-21 RX ORDER — CELECOXIB 200 MG/1
200 CAPSULE ORAL DAILY
Status: DISCONTINUED | OUTPATIENT
Start: 2021-09-21 | End: 2021-09-22 | Stop reason: HOSPADM

## 2021-09-21 RX ORDER — METHOCARBAMOL 750 MG/1
750 TABLET, FILM COATED ORAL 3 TIMES DAILY
Status: DISCONTINUED | OUTPATIENT
Start: 2021-09-21 | End: 2021-09-22 | Stop reason: HOSPADM

## 2021-09-21 RX ORDER — KETAMINE HCL IN 0.9 % NACL 50 MG/5 ML
SYRINGE (ML) INTRAVENOUS
Status: DISCONTINUED | OUTPATIENT
Start: 2021-09-21 | End: 2021-09-21

## 2021-09-21 RX ORDER — SODIUM CHLORIDE 0.9 % (FLUSH) 0.9 %
3 SYRINGE (ML) INJECTION
Status: DISCONTINUED | OUTPATIENT
Start: 2021-09-21 | End: 2021-09-21 | Stop reason: HOSPADM

## 2021-09-21 RX ORDER — CELECOXIB 200 MG/1
200 CAPSULE ORAL ONCE
Status: COMPLETED | OUTPATIENT
Start: 2021-09-21 | End: 2021-09-21

## 2021-09-21 RX ORDER — PANTOPRAZOLE SODIUM 40 MG/1
40 TABLET, DELAYED RELEASE ORAL DAILY
Refills: 3 | Status: DISCONTINUED | OUTPATIENT
Start: 2021-09-22 | End: 2021-09-22 | Stop reason: HOSPADM

## 2021-09-21 RX ORDER — LIDOCAINE HYDROCHLORIDE AND EPINEPHRINE 15; 5 MG/ML; UG/ML
INJECTION, SOLUTION EPIDURAL
Status: DISCONTINUED | OUTPATIENT
Start: 2021-09-21 | End: 2021-09-21

## 2021-09-21 RX ORDER — ATORVASTATIN CALCIUM 20 MG/1
20 TABLET, FILM COATED ORAL NIGHTLY
Status: DISCONTINUED | OUTPATIENT
Start: 2021-09-22 | End: 2021-09-22 | Stop reason: HOSPADM

## 2021-09-21 RX ORDER — MIDAZOLAM HYDROCHLORIDE 1 MG/ML
.5-4 INJECTION INTRAMUSCULAR; INTRAVENOUS
Status: DISCONTINUED | OUTPATIENT
Start: 2021-09-21 | End: 2021-09-21 | Stop reason: HOSPADM

## 2021-09-21 RX ORDER — OXYCODONE HYDROCHLORIDE 5 MG/1
5 TABLET ORAL EVERY 4 HOURS PRN
Status: DISCONTINUED | OUTPATIENT
Start: 2021-09-21 | End: 2021-09-22 | Stop reason: HOSPADM

## 2021-09-21 RX ORDER — PROPOFOL 10 MG/ML
VIAL (ML) INTRAVENOUS CONTINUOUS PRN
Status: DISCONTINUED | OUTPATIENT
Start: 2021-09-21 | End: 2021-09-21

## 2021-09-21 RX ORDER — ACETAMINOPHEN 325 MG/1
650 TABLET ORAL EVERY 6 HOURS
Status: DISCONTINUED | OUTPATIENT
Start: 2021-09-21 | End: 2021-09-22 | Stop reason: HOSPADM

## 2021-09-21 RX ORDER — SODIUM CHLORIDE 9 MG/ML
INJECTION, SOLUTION INTRAVENOUS CONTINUOUS
Status: DISCONTINUED | OUTPATIENT
Start: 2021-09-21 | End: 2021-09-22 | Stop reason: HOSPADM

## 2021-09-21 RX ORDER — DOCUSATE SODIUM 100 MG/1
100 CAPSULE, LIQUID FILLED ORAL 2 TIMES DAILY PRN
Status: DISCONTINUED | OUTPATIENT
Start: 2021-09-21 | End: 2021-09-22 | Stop reason: HOSPADM

## 2021-09-21 RX ORDER — LIDOCAINE HYDROCHLORIDE 20 MG/ML
INJECTION INTRAVENOUS
Status: DISCONTINUED | OUTPATIENT
Start: 2021-09-21 | End: 2021-09-21

## 2021-09-21 RX ORDER — ASPIRIN 81 MG/1
81 TABLET ORAL 2 TIMES DAILY
Status: DISCONTINUED | OUTPATIENT
Start: 2021-09-21 | End: 2021-09-22 | Stop reason: HOSPADM

## 2021-09-21 RX ORDER — TADALAFIL 5 MG/1
5 TABLET ORAL DAILY
Status: DISCONTINUED | OUTPATIENT
Start: 2021-09-22 | End: 2021-09-22 | Stop reason: HOSPADM

## 2021-09-21 RX ORDER — CEFAZOLIN SODIUM 1 G/3ML
2 INJECTION, POWDER, FOR SOLUTION INTRAMUSCULAR; INTRAVENOUS
Status: COMPLETED | OUTPATIENT
Start: 2021-09-21 | End: 2021-09-21

## 2021-09-21 RX ORDER — FENTANYL CITRATE 50 UG/ML
25-200 INJECTION, SOLUTION INTRAMUSCULAR; INTRAVENOUS
Status: DISCONTINUED | OUTPATIENT
Start: 2021-09-21 | End: 2021-09-21 | Stop reason: HOSPADM

## 2021-09-21 RX ORDER — FENTANYL CITRATE 50 UG/ML
INJECTION, SOLUTION INTRAMUSCULAR; INTRAVENOUS
Status: DISCONTINUED | OUTPATIENT
Start: 2021-09-21 | End: 2021-09-21

## 2021-09-21 RX ORDER — MUPIROCIN 20 MG/G
OINTMENT TOPICAL
Status: DISCONTINUED | OUTPATIENT
Start: 2021-09-21 | End: 2021-09-21 | Stop reason: HOSPADM

## 2021-09-21 RX ORDER — ONDANSETRON 2 MG/ML
INJECTION INTRAMUSCULAR; INTRAVENOUS
Status: DISCONTINUED | OUTPATIENT
Start: 2021-09-21 | End: 2021-09-21

## 2021-09-21 RX ORDER — ACETAMINOPHEN 500 MG
1000 TABLET ORAL
Status: COMPLETED | OUTPATIENT
Start: 2021-09-21 | End: 2021-09-21

## 2021-09-21 RX ADMIN — ASPIRIN 81 MG: 81 TABLET, COATED ORAL at 08:09

## 2021-09-21 RX ADMIN — CEFAZOLIN 2 G: 330 INJECTION, POWDER, FOR SOLUTION INTRAMUSCULAR; INTRAVENOUS at 12:09

## 2021-09-21 RX ADMIN — BUPIVACAINE HYDROCHLORIDE AND EPINEPHRINE 10 ML: 2.5; 5 INJECTION, SOLUTION EPIDURAL; INFILTRATION; INTRACAUDAL; PERINEURAL at 10:09

## 2021-09-21 RX ADMIN — Medication 25 MG: at 12:09

## 2021-09-21 RX ADMIN — CEFAZOLIN 2 G: 330 INJECTION, POWDER, FOR SOLUTION INTRAMUSCULAR; INTRAVENOUS at 08:09

## 2021-09-21 RX ADMIN — MIDAZOLAM HYDROCHLORIDE 2 MG: 1 INJECTION, SOLUTION INTRAMUSCULAR; INTRAVENOUS at 11:09

## 2021-09-21 RX ADMIN — FENTANYL CITRATE 50 MCG: 50 INJECTION, SOLUTION INTRAMUSCULAR; INTRAVENOUS at 12:09

## 2021-09-21 RX ADMIN — CELECOXIB 200 MG: 200 CAPSULE ORAL at 03:09

## 2021-09-21 RX ADMIN — CELECOXIB 200 MG: 200 CAPSULE ORAL at 09:09

## 2021-09-21 RX ADMIN — FENTANYL CITRATE 50 MCG: 50 INJECTION INTRAMUSCULAR; INTRAVENOUS at 10:09

## 2021-09-21 RX ADMIN — ACETAMINOPHEN 650 MG: 325 TABLET ORAL at 06:09

## 2021-09-21 RX ADMIN — SODIUM CHLORIDE: 0.9 INJECTION, SOLUTION INTRAVENOUS at 10:09

## 2021-09-21 RX ADMIN — DEXAMETHASONE SODIUM PHOSPHATE 8 MG: 4 INJECTION, SOLUTION INTRAMUSCULAR; INTRAVENOUS at 12:09

## 2021-09-21 RX ADMIN — BUPIVACAINE HYDROCHLORIDE AND EPINEPHRINE BITARTRATE 20 ML: 2.5; .0091 INJECTION, SOLUTION EPIDURAL; INFILTRATION; INTRACAUDAL; PERINEURAL at 10:09

## 2021-09-21 RX ADMIN — ACETAMINOPHEN 1000 MG: 500 TABLET ORAL at 09:09

## 2021-09-21 RX ADMIN — MIDAZOLAM 2 MG: 1 INJECTION INTRAMUSCULAR; INTRAVENOUS at 10:09

## 2021-09-21 RX ADMIN — TRANEXAMIC ACID 1000 MG: 100 INJECTION, SOLUTION INTRAVENOUS at 12:09

## 2021-09-21 RX ADMIN — METHOCARBAMOL 750 MG: 750 TABLET ORAL at 03:09

## 2021-09-21 RX ADMIN — SODIUM CHLORIDE: 0.9 INJECTION, SOLUTION INTRAVENOUS at 09:09

## 2021-09-21 RX ADMIN — OXYCODONE 5 MG: 5 TABLET ORAL at 03:09

## 2021-09-21 RX ADMIN — LIDOCAINE HYDROCHLORIDE 20 MG: 20 INJECTION, SOLUTION INTRAVENOUS at 12:09

## 2021-09-21 RX ADMIN — DEXMEDETOMIDINE HYDROCHLORIDE 12 MCG: 100 INJECTION, SOLUTION, CONCENTRATE INTRAVENOUS at 12:09

## 2021-09-21 RX ADMIN — PROPOFOL 125 MCG/KG/MIN: 10 INJECTION, EMULSION INTRAVENOUS at 12:09

## 2021-09-21 RX ADMIN — GLYCOPYRROLATE 0.2 MG: 0.2 INJECTION, SOLUTION INTRAMUSCULAR; INTRAVITREAL at 12:09

## 2021-09-21 RX ADMIN — TRANEXAMIC ACID 1000 MG: 100 INJECTION, SOLUTION INTRAVENOUS at 01:09

## 2021-09-21 RX ADMIN — METHOCARBAMOL 750 MG: 750 TABLET ORAL at 08:09

## 2021-09-21 RX ADMIN — MEPIVACAINE HYDROCHLORIDE 4 ML: 15 INJECTION, SOLUTION EPIDURAL; INFILTRATION at 12:09

## 2021-09-21 RX ADMIN — OXYCODONE 5 MG: 5 TABLET ORAL at 08:09

## 2021-09-21 RX ADMIN — ONDANSETRON 4 MG: 2 INJECTION, SOLUTION INTRAMUSCULAR; INTRAVENOUS at 12:09

## 2021-09-21 RX ADMIN — MUPIROCIN: 20 OINTMENT TOPICAL at 09:09

## 2021-09-21 RX ADMIN — CARBOXYMETHYLCELLULOSE SODIUM 2 DROP: 5 SOLUTION/ DROPS OPHTHALMIC at 02:09

## 2021-09-21 RX ADMIN — VANCOMYCIN HYDROCHLORIDE 1500 MG: 1.5 INJECTION, POWDER, LYOPHILIZED, FOR SOLUTION INTRAVENOUS at 09:09

## 2021-09-21 RX ADMIN — LIDOCAINE HYDROCHLORIDE,EPINEPHRINE BITARTRATE 5 ML: 15; .005 INJECTION, SOLUTION EPIDURAL; INFILTRATION; INTRACAUDAL; PERINEURAL at 02:09

## 2021-09-22 ENCOUNTER — PATIENT MESSAGE (OUTPATIENT)
Dept: ADMINISTRATIVE | Facility: OTHER | Age: 71
End: 2021-09-22

## 2021-09-22 VITALS
HEIGHT: 73 IN | OXYGEN SATURATION: 97 % | DIASTOLIC BLOOD PRESSURE: 60 MMHG | SYSTOLIC BLOOD PRESSURE: 124 MMHG | BODY MASS INDEX: 25.18 KG/M2 | TEMPERATURE: 98 F | HEART RATE: 63 BPM | RESPIRATION RATE: 18 BRPM | WEIGHT: 190 LBS

## 2021-09-22 PROBLEM — T84.011A: Status: ACTIVE | Noted: 2021-09-21

## 2021-09-22 PROBLEM — Z96.649 STATUS POST REVISION OF TOTAL HIP: Status: ACTIVE | Noted: 2021-09-22

## 2021-09-22 LAB
BUN SERPL-MCNC: 21 MG/DL (ref 6–30)
CHLORIDE SERPL-SCNC: 105 MMOL/L (ref 95–110)
CREAT SERPL-MCNC: 1 MG/DL (ref 0.5–1.4)
GLUCOSE SERPL-MCNC: 123 MG/DL (ref 70–110)
HCT VFR BLD CALC: 33 %PCV (ref 36–54)
POC IONIZED CALCIUM: 1.14 MMOL/L (ref 1.06–1.42)
POC TCO2 (MEASURED): 20 MMOL/L (ref 23–29)
POTASSIUM BLD-SCNC: 4.1 MMOL/L (ref 3.5–5.1)
SAMPLE: ABNORMAL
SODIUM BLD-SCNC: 138 MMOL/L (ref 136–145)

## 2021-09-22 PROCEDURE — 84132 ASSAY OF SERUM POTASSIUM: CPT

## 2021-09-22 PROCEDURE — 99232 SBSQ HOSP IP/OBS MODERATE 35: CPT | Mod: ,,, | Performed by: NURSE PRACTITIONER

## 2021-09-22 PROCEDURE — 51798 US URINE CAPACITY MEASURE: CPT

## 2021-09-22 PROCEDURE — 85014 HEMATOCRIT: CPT

## 2021-09-22 PROCEDURE — 94761 N-INVAS EAR/PLS OXIMETRY MLT: CPT

## 2021-09-22 PROCEDURE — 25000003 PHARM REV CODE 250: Performed by: ANESTHESIOLOGY

## 2021-09-22 PROCEDURE — 97165 OT EVAL LOW COMPLEX 30 MIN: CPT

## 2021-09-22 PROCEDURE — 99232 PR SUBSEQUENT HOSPITAL CARE,LEVL II: ICD-10-PCS | Mod: ,,, | Performed by: NURSE PRACTITIONER

## 2021-09-22 PROCEDURE — 82330 ASSAY OF CALCIUM: CPT

## 2021-09-22 PROCEDURE — 99900035 HC TECH TIME PER 15 MIN (STAT)

## 2021-09-22 PROCEDURE — 97530 THERAPEUTIC ACTIVITIES: CPT | Mod: CQ

## 2021-09-22 PROCEDURE — 97116 GAIT TRAINING THERAPY: CPT | Mod: CQ

## 2021-09-22 PROCEDURE — 82565 ASSAY OF CREATININE: CPT

## 2021-09-22 PROCEDURE — 84295 ASSAY OF SERUM SODIUM: CPT

## 2021-09-22 PROCEDURE — 63600175 PHARM REV CODE 636 W HCPCS: Performed by: STUDENT IN AN ORGANIZED HEALTH CARE EDUCATION/TRAINING PROGRAM

## 2021-09-22 PROCEDURE — 97535 SELF CARE MNGMENT TRAINING: CPT

## 2021-09-22 PROCEDURE — 25000003 PHARM REV CODE 250: Performed by: STUDENT IN AN ORGANIZED HEALTH CARE EDUCATION/TRAINING PROGRAM

## 2021-09-22 RX ORDER — MUPIROCIN 20 MG/G
OINTMENT TOPICAL 2 TIMES DAILY
Status: CANCELLED | OUTPATIENT
Start: 2021-09-22 | End: 2021-09-27

## 2021-09-22 RX ADMIN — PANTOPRAZOLE SODIUM 40 MG: 40 TABLET, DELAYED RELEASE ORAL at 08:09

## 2021-09-22 RX ADMIN — CEFAZOLIN 2 G: 330 INJECTION, POWDER, FOR SOLUTION INTRAMUSCULAR; INTRAVENOUS at 04:09

## 2021-09-22 RX ADMIN — ASPIRIN 81 MG: 81 TABLET, COATED ORAL at 08:09

## 2021-09-22 RX ADMIN — THERA TABS 1 TABLET: TAB at 08:09

## 2021-09-22 RX ADMIN — ACETAMINOPHEN 650 MG: 325 TABLET ORAL at 06:09

## 2021-09-22 RX ADMIN — ACETAMINOPHEN 650 MG: 325 TABLET ORAL at 12:09

## 2021-09-22 RX ADMIN — OXYCODONE 5 MG: 5 TABLET ORAL at 04:09

## 2021-09-22 RX ADMIN — CELECOXIB 200 MG: 200 CAPSULE ORAL at 08:09

## 2021-09-22 RX ADMIN — METHOCARBAMOL 750 MG: 750 TABLET ORAL at 08:09

## 2021-09-22 RX ADMIN — OXYCODONE 5 MG: 5 TABLET ORAL at 12:09

## 2021-09-23 ENCOUNTER — CLINICAL SUPPORT (OUTPATIENT)
Dept: ORTHOPEDICS | Facility: CLINIC | Age: 71
End: 2021-09-23
Payer: MEDICARE

## 2021-09-23 ENCOUNTER — PATIENT MESSAGE (OUTPATIENT)
Dept: ADMINISTRATIVE | Facility: OTHER | Age: 71
End: 2021-09-23

## 2021-09-23 DIAGNOSIS — Z96.643 S/P BILATERAL REVISION OF TOTAL HIP REPLACEMENT: Primary | ICD-10-CM

## 2021-09-23 LAB
GRAM STN SPEC: NORMAL

## 2021-09-23 PROCEDURE — 99499 NO LOS: ICD-10-PCS | Mod: 95,,, | Performed by: ORTHOPAEDIC SURGERY

## 2021-09-23 PROCEDURE — 99499 UNLISTED E&M SERVICE: CPT | Mod: 95,,, | Performed by: ORTHOPAEDIC SURGERY

## 2021-09-24 ENCOUNTER — PATIENT MESSAGE (OUTPATIENT)
Dept: ADMINISTRATIVE | Facility: OTHER | Age: 71
End: 2021-09-24

## 2021-09-27 LAB
BACTERIA SPEC AEROBE CULT: NO GROWTH
BACTERIA SPEC AEROBE CULT: NO GROWTH
BLD PROD TYP BPU: NORMAL
BLD PROD TYP BPU: NORMAL
BLOOD UNIT EXPIRATION DATE: NORMAL
BLOOD UNIT EXPIRATION DATE: NORMAL
BLOOD UNIT TYPE CODE: 6200
BLOOD UNIT TYPE CODE: 6200
BLOOD UNIT TYPE: NORMAL
BLOOD UNIT TYPE: NORMAL
CODING SYSTEM: NORMAL
CODING SYSTEM: NORMAL
DISPENSE STATUS: NORMAL
DISPENSE STATUS: NORMAL
FINAL PATHOLOGIC DIAGNOSIS: NORMAL
GROSS: NORMAL
Lab: NORMAL
NUM UNITS TRANS PACKED RBC: NORMAL
NUM UNITS TRANS PACKED RBC: NORMAL

## 2021-09-28 ENCOUNTER — PATIENT MESSAGE (OUTPATIENT)
Dept: ADMINISTRATIVE | Facility: OTHER | Age: 71
End: 2021-09-28

## 2021-09-29 ENCOUNTER — PES CALL (OUTPATIENT)
Dept: ADMINISTRATIVE | Facility: CLINIC | Age: 71
End: 2021-09-29

## 2021-09-30 LAB
BACTERIA SPEC ANAEROBE CULT: NORMAL
BACTERIA SPEC ANAEROBE CULT: NORMAL

## 2021-10-04 ENCOUNTER — OFFICE VISIT (OUTPATIENT)
Dept: ORTHOPEDICS | Facility: CLINIC | Age: 71
End: 2021-10-04
Payer: MEDICARE

## 2021-10-04 DIAGNOSIS — T84.019D PROSTHETIC JOINT IMPLANT FAILURE, SUBSEQUENT ENCOUNTER: Primary | ICD-10-CM

## 2021-10-04 PROCEDURE — 99999 PR PBB SHADOW E&M-EST. PATIENT-LVL III: CPT | Mod: PBBFAC,,, | Performed by: NURSE PRACTITIONER

## 2021-10-04 PROCEDURE — 99999 PR PBB SHADOW E&M-EST. PATIENT-LVL III: ICD-10-PCS | Mod: PBBFAC,,, | Performed by: NURSE PRACTITIONER

## 2021-10-04 PROCEDURE — 99024 PR POST-OP FOLLOW-UP VISIT: ICD-10-PCS | Mod: POP,,, | Performed by: NURSE PRACTITIONER

## 2021-10-04 PROCEDURE — 99213 OFFICE O/P EST LOW 20 MIN: CPT | Mod: PBBFAC | Performed by: NURSE PRACTITIONER

## 2021-10-04 PROCEDURE — 99024 POSTOP FOLLOW-UP VISIT: CPT | Mod: POP,,, | Performed by: NURSE PRACTITIONER

## 2021-10-04 RX ORDER — OXYCODONE HYDROCHLORIDE 5 MG/1
TABLET ORAL
Qty: 30 TABLET | Refills: 0 | Status: SHIPPED | OUTPATIENT
Start: 2021-10-04 | End: 2021-11-08

## 2021-10-22 ENCOUNTER — PATIENT MESSAGE (OUTPATIENT)
Dept: ADMINISTRATIVE | Facility: OTHER | Age: 71
End: 2021-10-22
Payer: MEDICARE

## 2021-10-25 LAB
FUNGUS SPEC CULT: NORMAL
FUNGUS SPEC CULT: NORMAL

## 2021-10-26 DIAGNOSIS — M25.552 LEFT HIP PAIN: Primary | ICD-10-CM

## 2021-11-01 ENCOUNTER — OFFICE VISIT (OUTPATIENT)
Dept: ORTHOPEDICS | Facility: CLINIC | Age: 71
End: 2021-11-01
Payer: MEDICARE

## 2021-11-01 ENCOUNTER — HOSPITAL ENCOUNTER (OUTPATIENT)
Dept: RADIOLOGY | Facility: HOSPITAL | Age: 71
Discharge: HOME | End: 2021-11-01
Attending: ORTHOPAEDIC SURGERY
Payer: MEDICARE

## 2021-11-01 VITALS — HEIGHT: 73 IN | WEIGHT: 191.25 LBS | BODY MASS INDEX: 25.35 KG/M2

## 2021-11-01 DIAGNOSIS — M25.552 LEFT HIP PAIN: Primary | ICD-10-CM

## 2021-11-01 DIAGNOSIS — M25.552 LEFT HIP PAIN: ICD-10-CM

## 2021-11-01 DIAGNOSIS — Z23 FLU VACCINE NEED: Primary | ICD-10-CM

## 2021-11-01 PROCEDURE — 99999 PR PBB SHADOW E&M-EST. PATIENT-LVL III: CPT | Mod: PBBFAC,,, | Performed by: ORTHOPAEDIC SURGERY

## 2021-11-01 PROCEDURE — 99213 OFFICE O/P EST LOW 20 MIN: CPT | Mod: PBBFAC | Performed by: ORTHOPAEDIC SURGERY

## 2021-11-01 PROCEDURE — 73502 X-RAY EXAM HIP UNI 2-3 VIEWS: CPT | Mod: 26,LT,, | Performed by: RADIOLOGY

## 2021-11-01 PROCEDURE — 99024 PR POST-OP FOLLOW-UP VISIT: ICD-10-PCS | Mod: POP,,, | Performed by: ORTHOPAEDIC SURGERY

## 2021-11-01 PROCEDURE — 73502 XR HIP WITH PELVIS WHEN PERFORMED, 2 OR 3 VIEWS LEFT: ICD-10-PCS | Mod: 26,LT,, | Performed by: RADIOLOGY

## 2021-11-01 PROCEDURE — 73502 X-RAY EXAM HIP UNI 2-3 VIEWS: CPT | Mod: TC,LT

## 2021-11-01 PROCEDURE — 99024 POSTOP FOLLOW-UP VISIT: CPT | Mod: POP,,, | Performed by: ORTHOPAEDIC SURGERY

## 2021-11-01 PROCEDURE — 99999 PR PBB SHADOW E&M-EST. PATIENT-LVL III: ICD-10-PCS | Mod: PBBFAC,,, | Performed by: ORTHOPAEDIC SURGERY

## 2021-11-04 NOTE — PROGRESS NOTES
Report called to Jamir RN, pt made ready fro transport to [facility] via hosp bed per transporters, resting quietly,VSS, incision and laps x5 dry and intact, swanson draining pink urine to gravity pt states pain 3/10 and tolerable, roger po diet, wife updated, belongings bag sent wit pt, pt states he did not bring eyeglasses to hospital.  
No

## 2021-11-08 ENCOUNTER — PATIENT MESSAGE (OUTPATIENT)
Dept: ORTHOPEDICS | Facility: CLINIC | Age: 71
End: 2021-11-08
Payer: MEDICARE

## 2021-11-08 ENCOUNTER — PATIENT MESSAGE (OUTPATIENT)
Dept: UROLOGY | Facility: CLINIC | Age: 71
End: 2021-11-08
Payer: MEDICARE

## 2021-11-08 DIAGNOSIS — M25.552 LEFT HIP PAIN: Primary | ICD-10-CM

## 2021-11-08 DIAGNOSIS — R39.9 LOWER URINARY TRACT SYMPTOMS (LUTS): Primary | ICD-10-CM

## 2021-11-08 RX ORDER — TAMSULOSIN HYDROCHLORIDE 0.4 MG/1
0.4 CAPSULE ORAL DAILY
COMMUNITY
Start: 2021-11-08 | End: 2021-11-08 | Stop reason: SDUPTHER

## 2021-11-08 RX ORDER — TAMSULOSIN HYDROCHLORIDE 0.4 MG/1
0.4 CAPSULE ORAL DAILY
Qty: 90 CAPSULE | Refills: 3 | Status: SHIPPED | OUTPATIENT
Start: 2021-11-08 | End: 2022-07-20

## 2021-11-08 RX ORDER — CELECOXIB 200 MG/1
200 CAPSULE ORAL 2 TIMES DAILY
Qty: 60 CAPSULE | Refills: 0 | Status: SHIPPED | OUTPATIENT
Start: 2021-11-08 | End: 2021-12-09

## 2021-11-11 LAB
ACID FAST MOD KINY STN SPEC: NORMAL
ACID FAST MOD KINY STN SPEC: NORMAL
MYCOBACTERIUM SPEC QL CULT: NORMAL
MYCOBACTERIUM SPEC QL CULT: NORMAL

## 2021-11-17 ENCOUNTER — PATIENT OUTREACH (OUTPATIENT)
Dept: ADMINISTRATIVE | Facility: OTHER | Age: 71
End: 2021-11-17
Payer: MEDICARE

## 2021-11-18 ENCOUNTER — PATIENT MESSAGE (OUTPATIENT)
Dept: INTERNAL MEDICINE | Facility: CLINIC | Age: 71
End: 2021-11-18
Payer: MEDICARE

## 2021-11-18 DIAGNOSIS — J32.0 CHRONIC MAXILLARY SINUSITIS: ICD-10-CM

## 2021-11-18 RX ORDER — FLUTICASONE PROPIONATE 50 MCG
2 SPRAY, SUSPENSION (ML) NASAL NIGHTLY PRN
Qty: 48 G | Refills: 0 | Status: SHIPPED | OUTPATIENT
Start: 2021-11-18 | End: 2022-03-03 | Stop reason: SDUPTHER

## 2021-11-21 ENCOUNTER — PATIENT MESSAGE (OUTPATIENT)
Dept: ADMINISTRATIVE | Facility: OTHER | Age: 71
End: 2021-11-21
Payer: MEDICARE

## 2021-11-22 ENCOUNTER — OFFICE VISIT (OUTPATIENT)
Dept: UROLOGY | Facility: CLINIC | Age: 71
End: 2021-11-22
Payer: MEDICARE

## 2021-11-22 VITALS
HEIGHT: 73 IN | BODY MASS INDEX: 26.15 KG/M2 | SYSTOLIC BLOOD PRESSURE: 160 MMHG | WEIGHT: 197.31 LBS | DIASTOLIC BLOOD PRESSURE: 92 MMHG | HEART RATE: 43 BPM

## 2021-11-22 DIAGNOSIS — C61 PROSTATE CANCER: ICD-10-CM

## 2021-11-22 DIAGNOSIS — N52.31 ERECTILE DYSFUNCTION FOLLOWING RADICAL PROSTATECTOMY: Primary | ICD-10-CM

## 2021-11-22 DIAGNOSIS — I10 PRIMARY HYPERTENSION: ICD-10-CM

## 2021-11-22 DIAGNOSIS — N52.31 ERECTILE DYSFUNCTION AFTER RADICAL PROSTATECTOMY: ICD-10-CM

## 2021-11-22 DIAGNOSIS — E78.49 OTHER HYPERLIPIDEMIA: ICD-10-CM

## 2021-11-22 PROCEDURE — 99214 OFFICE O/P EST MOD 30 MIN: CPT | Mod: PBBFAC | Performed by: UROLOGY

## 2021-11-22 PROCEDURE — 99214 OFFICE O/P EST MOD 30 MIN: CPT | Mod: S$PBB,,, | Performed by: UROLOGY

## 2021-11-22 PROCEDURE — 99999 PR PBB SHADOW E&M-EST. PATIENT-LVL IV: CPT | Mod: PBBFAC,,, | Performed by: UROLOGY

## 2021-11-22 PROCEDURE — 99999 PR PBB SHADOW E&M-EST. PATIENT-LVL IV: ICD-10-PCS | Mod: PBBFAC,,, | Performed by: UROLOGY

## 2021-11-22 PROCEDURE — 99214 PR OFFICE/OUTPT VISIT, EST, LEVL IV, 30-39 MIN: ICD-10-PCS | Mod: S$PBB,,, | Performed by: UROLOGY

## 2021-11-22 RX ORDER — PAPAVERINE HYDROCHLORIDE 30 MG/ML
INJECTION INTRAMUSCULAR; INTRAVENOUS
Qty: 5 ML | Refills: 12 | Status: SHIPPED | OUTPATIENT
Start: 2021-11-22 | End: 2022-07-20

## 2021-11-22 RX ORDER — TADALAFIL 5 MG/1
5 TABLET ORAL DAILY
Qty: 90 TABLET | Refills: 3 | Status: SHIPPED | OUTPATIENT
Start: 2021-11-22 | End: 2022-07-20

## 2021-11-24 ENCOUNTER — PATIENT MESSAGE (OUTPATIENT)
Dept: ORTHOPEDICS | Facility: CLINIC | Age: 71
End: 2021-11-24
Payer: MEDICARE

## 2021-11-24 DIAGNOSIS — Z96.649 STATUS POST HIP REPLACEMENT, UNSPECIFIED LATERALITY: Primary | ICD-10-CM

## 2021-11-24 RX ORDER — AMOXICILLIN 500 MG/1
2000 CAPSULE ORAL
Qty: 4 CAPSULE | Refills: 0 | Status: SHIPPED | OUTPATIENT
Start: 2021-11-24 | End: 2022-07-20

## 2021-12-06 ENCOUNTER — PATIENT MESSAGE (OUTPATIENT)
Dept: ADMINISTRATIVE | Facility: OTHER | Age: 71
End: 2021-12-06
Payer: MEDICARE

## 2021-12-06 ENCOUNTER — LAB VISIT (OUTPATIENT)
Dept: LAB | Facility: HOSPITAL | Age: 71
End: 2021-12-06
Attending: UROLOGY
Payer: MEDICARE

## 2021-12-06 DIAGNOSIS — C61 PROSTATE CANCER: ICD-10-CM

## 2021-12-06 LAB — COMPLEXED PSA SERPL-MCNC: 0.13 NG/ML (ref 0–4)

## 2021-12-06 PROCEDURE — 36415 COLL VENOUS BLD VENIPUNCTURE: CPT | Mod: PO | Performed by: UROLOGY

## 2021-12-06 PROCEDURE — 84153 ASSAY OF PSA TOTAL: CPT | Performed by: UROLOGY

## 2021-12-09 ENCOUNTER — PATIENT MESSAGE (OUTPATIENT)
Dept: INTERNAL MEDICINE | Facility: CLINIC | Age: 71
End: 2021-12-09
Payer: MEDICARE

## 2021-12-09 ENCOUNTER — LAB VISIT (OUTPATIENT)
Dept: LAB | Facility: HOSPITAL | Age: 71
End: 2021-12-09
Attending: INTERNAL MEDICINE
Payer: MEDICARE

## 2021-12-09 ENCOUNTER — OFFICE VISIT (OUTPATIENT)
Dept: INTERNAL MEDICINE | Facility: CLINIC | Age: 71
End: 2021-12-09
Payer: MEDICARE

## 2021-12-09 VITALS
BODY MASS INDEX: 26.18 KG/M2 | SYSTOLIC BLOOD PRESSURE: 152 MMHG | DIASTOLIC BLOOD PRESSURE: 88 MMHG | WEIGHT: 197.56 LBS | OXYGEN SATURATION: 99 % | HEART RATE: 42 BPM | HEIGHT: 73 IN

## 2021-12-09 DIAGNOSIS — R00.1 SINUS BRADYCARDIA: ICD-10-CM

## 2021-12-09 DIAGNOSIS — Z12.83 SCREENING EXAM FOR SKIN CANCER: ICD-10-CM

## 2021-12-09 DIAGNOSIS — Z01.00 ENCOUNTER FOR VISION SCREENING: ICD-10-CM

## 2021-12-09 DIAGNOSIS — E07.9 THYROID CONDITION: ICD-10-CM

## 2021-12-09 DIAGNOSIS — E78.5 HYPERLIPIDEMIA, UNSPECIFIED HYPERLIPIDEMIA TYPE: ICD-10-CM

## 2021-12-09 DIAGNOSIS — I10 HYPERTENSION, UNSPECIFIED TYPE: Primary | ICD-10-CM

## 2021-12-09 DIAGNOSIS — C61 PROSTATE CANCER: ICD-10-CM

## 2021-12-09 LAB
CHOLEST SERPL-MCNC: 142 MG/DL (ref 120–199)
CHOLEST/HDLC SERPL: 2.4 {RATIO} (ref 2–5)
HDLC SERPL-MCNC: 58 MG/DL (ref 40–75)
HDLC SERPL: 40.8 % (ref 20–50)
LDLC SERPL CALC-MCNC: 72.6 MG/DL (ref 63–159)
NONHDLC SERPL-MCNC: 84 MG/DL
TRIGL SERPL-MCNC: 57 MG/DL (ref 30–150)
TSH SERPL DL<=0.005 MIU/L-ACNC: 1.38 UIU/ML (ref 0.4–4)

## 2021-12-09 PROCEDURE — 36415 COLL VENOUS BLD VENIPUNCTURE: CPT | Performed by: INTERNAL MEDICINE

## 2021-12-09 PROCEDURE — 80061 LIPID PANEL: CPT | Performed by: INTERNAL MEDICINE

## 2021-12-09 PROCEDURE — 99999 PR PBB SHADOW E&M-EST. PATIENT-LVL V: CPT | Mod: PBBFAC,,, | Performed by: INTERNAL MEDICINE

## 2021-12-09 PROCEDURE — 99999 PR PBB SHADOW E&M-EST. PATIENT-LVL V: ICD-10-PCS | Mod: PBBFAC,,, | Performed by: INTERNAL MEDICINE

## 2021-12-09 PROCEDURE — 99215 OFFICE O/P EST HI 40 MIN: CPT | Mod: PBBFAC | Performed by: INTERNAL MEDICINE

## 2021-12-09 PROCEDURE — 84443 ASSAY THYROID STIM HORMONE: CPT | Performed by: INTERNAL MEDICINE

## 2021-12-09 PROCEDURE — 99214 OFFICE O/P EST MOD 30 MIN: CPT | Mod: S$PBB,,, | Performed by: INTERNAL MEDICINE

## 2021-12-09 PROCEDURE — 99214 PR OFFICE/OUTPT VISIT, EST, LEVL IV, 30-39 MIN: ICD-10-PCS | Mod: S$PBB,,, | Performed by: INTERNAL MEDICINE

## 2021-12-09 RX ORDER — LISINOPRIL 10 MG/1
20 TABLET ORAL DAILY
Qty: 180 TABLET | Refills: 3 | Status: SHIPPED | OUTPATIENT
Start: 2021-12-09 | End: 2022-11-09 | Stop reason: SDUPTHER

## 2021-12-10 RX ORDER — NEOMYCIN SULFATE, POLYMYXIN B SULFATE, AND DEXAMETHASONE 3.5; 10000; 1 MG/G; [USP'U]/G; MG/G
OINTMENT OPHTHALMIC
Qty: 3.5 G | Refills: 0 | Status: SHIPPED | OUTPATIENT
Start: 2021-12-10 | End: 2021-12-22 | Stop reason: SDUPTHER

## 2021-12-10 RX ORDER — NEOMYCIN SULFATE, POLYMYXIN B SULFATE, AND DEXAMETHASONE 3.5; 10000; 1 MG/G; [USP'U]/G; MG/G
OINTMENT OPHTHALMIC
Qty: 3.5 G | Refills: 0 | Status: SHIPPED | OUTPATIENT
Start: 2021-12-10 | End: 2021-12-10 | Stop reason: SDUPTHER

## 2021-12-13 ENCOUNTER — LAB VISIT (OUTPATIENT)
Dept: LAB | Facility: HOSPITAL | Age: 71
End: 2021-12-13
Attending: ORTHOPAEDIC SURGERY
Payer: MEDICARE

## 2021-12-13 ENCOUNTER — OFFICE VISIT (OUTPATIENT)
Dept: ORTHOPEDICS | Facility: CLINIC | Age: 71
End: 2021-12-13
Payer: MEDICARE

## 2021-12-13 VITALS — WEIGHT: 193.81 LBS | HEIGHT: 73 IN | BODY MASS INDEX: 25.69 KG/M2

## 2021-12-13 DIAGNOSIS — M25.552 LEFT HIP PAIN: Primary | ICD-10-CM

## 2021-12-13 DIAGNOSIS — M25.552 LEFT HIP PAIN: ICD-10-CM

## 2021-12-13 DIAGNOSIS — Z96.649 STATUS POST HIP REPLACEMENT, UNSPECIFIED LATERALITY: ICD-10-CM

## 2021-12-13 PROCEDURE — 99213 OFFICE O/P EST LOW 20 MIN: CPT | Mod: PBBFAC | Performed by: ORTHOPAEDIC SURGERY

## 2021-12-13 PROCEDURE — 99999 PR PBB SHADOW E&M-EST. PATIENT-LVL III: ICD-10-PCS | Mod: PBBFAC,,, | Performed by: ORTHOPAEDIC SURGERY

## 2021-12-13 PROCEDURE — 99999 PR PBB SHADOW E&M-EST. PATIENT-LVL III: CPT | Mod: PBBFAC,,, | Performed by: ORTHOPAEDIC SURGERY

## 2021-12-13 PROCEDURE — 99024 PR POST-OP FOLLOW-UP VISIT: ICD-10-PCS | Mod: POP,,, | Performed by: ORTHOPAEDIC SURGERY

## 2021-12-13 PROCEDURE — 36415 COLL VENOUS BLD VENIPUNCTURE: CPT | Performed by: ORTHOPAEDIC SURGERY

## 2021-12-13 PROCEDURE — 99024 POSTOP FOLLOW-UP VISIT: CPT | Mod: POP,,, | Performed by: ORTHOPAEDIC SURGERY

## 2021-12-14 ENCOUNTER — PATIENT MESSAGE (OUTPATIENT)
Dept: ORTHOPEDICS | Facility: CLINIC | Age: 71
End: 2021-12-14
Payer: MEDICARE

## 2021-12-15 LAB
COBALT SERPL-MCNC: 2.3 NG/ML
CR SERPL-MCNC: 3.1 NG/ML

## 2021-12-16 ENCOUNTER — OFFICE VISIT (OUTPATIENT)
Dept: UROLOGY | Facility: CLINIC | Age: 71
End: 2021-12-16
Payer: MEDICARE

## 2021-12-16 ENCOUNTER — TELEPHONE (OUTPATIENT)
Dept: ORTHOPEDICS | Facility: CLINIC | Age: 71
End: 2021-12-16
Payer: MEDICARE

## 2021-12-16 ENCOUNTER — CLINICAL SUPPORT (OUTPATIENT)
Dept: INTERNAL MEDICINE | Facility: CLINIC | Age: 71
End: 2021-12-16
Payer: MEDICARE

## 2021-12-16 VITALS
DIASTOLIC BLOOD PRESSURE: 78 MMHG | DIASTOLIC BLOOD PRESSURE: 87 MMHG | WEIGHT: 194 LBS | SYSTOLIC BLOOD PRESSURE: 138 MMHG | HEART RATE: 49 BPM | BODY MASS INDEX: 24.9 KG/M2 | SYSTOLIC BLOOD PRESSURE: 143 MMHG | HEIGHT: 74 IN

## 2021-12-16 DIAGNOSIS — C61 PROSTATE CANCER: Primary | ICD-10-CM

## 2021-12-16 PROCEDURE — 99999 PR PBB SHADOW E&M-EST. PATIENT-LVL IV: CPT | Mod: PBBFAC,,, | Performed by: UROLOGY

## 2021-12-16 PROCEDURE — 99999 PR PBB SHADOW E&M-EST. PATIENT-LVL I: CPT | Mod: PBBFAC,,,

## 2021-12-16 PROCEDURE — 99214 OFFICE O/P EST MOD 30 MIN: CPT | Mod: PBBFAC | Performed by: UROLOGY

## 2021-12-16 PROCEDURE — 99999 PR PBB SHADOW E&M-EST. PATIENT-LVL IV: ICD-10-PCS | Mod: PBBFAC,,, | Performed by: UROLOGY

## 2021-12-16 PROCEDURE — 99213 OFFICE O/P EST LOW 20 MIN: CPT | Mod: S$PBB,,, | Performed by: UROLOGY

## 2021-12-16 PROCEDURE — 99213 PR OFFICE/OUTPT VISIT, EST, LEVL III, 20-29 MIN: ICD-10-PCS | Mod: S$PBB,,, | Performed by: UROLOGY

## 2021-12-16 PROCEDURE — 99999 PR PBB SHADOW E&M-EST. PATIENT-LVL I: ICD-10-PCS | Mod: PBBFAC,,,

## 2021-12-16 PROCEDURE — 99211 OFF/OP EST MAY X REQ PHY/QHP: CPT | Mod: PBBFAC

## 2021-12-17 ENCOUNTER — TELEPHONE (OUTPATIENT)
Dept: RADIATION ONCOLOGY | Facility: CLINIC | Age: 71
End: 2021-12-17
Payer: MEDICARE

## 2021-12-21 ENCOUNTER — PATIENT MESSAGE (OUTPATIENT)
Dept: INTERNAL MEDICINE | Facility: CLINIC | Age: 71
End: 2021-12-21
Payer: MEDICARE

## 2021-12-21 ENCOUNTER — PATIENT MESSAGE (OUTPATIENT)
Dept: ADMINISTRATIVE | Facility: OTHER | Age: 71
End: 2021-12-21
Payer: MEDICARE

## 2021-12-21 ENCOUNTER — PATIENT MESSAGE (OUTPATIENT)
Dept: RADIATION ONCOLOGY | Facility: CLINIC | Age: 71
End: 2021-12-21

## 2021-12-21 ENCOUNTER — OFFICE VISIT (OUTPATIENT)
Dept: RADIATION ONCOLOGY | Facility: CLINIC | Age: 71
End: 2021-12-21
Payer: MEDICARE

## 2021-12-21 VITALS
RESPIRATION RATE: 16 BRPM | BODY MASS INDEX: 25.65 KG/M2 | HEART RATE: 43 BPM | WEIGHT: 199.88 LBS | SYSTOLIC BLOOD PRESSURE: 171 MMHG | DIASTOLIC BLOOD PRESSURE: 79 MMHG | HEIGHT: 74 IN

## 2021-12-21 DIAGNOSIS — C61 PROSTATE CANCER: ICD-10-CM

## 2021-12-21 PROCEDURE — 99203 OFFICE O/P NEW LOW 30 MIN: CPT | Mod: S$PBB,,, | Performed by: RADIOLOGY

## 2021-12-21 PROCEDURE — 99215 OFFICE O/P EST HI 40 MIN: CPT | Mod: PBBFAC | Performed by: RADIOLOGY

## 2021-12-21 PROCEDURE — 99203 PR OFFICE/OUTPT VISIT, NEW, LEVL III, 30-44 MIN: ICD-10-PCS | Mod: S$PBB,,, | Performed by: RADIOLOGY

## 2021-12-21 PROCEDURE — 99999 PR PBB SHADOW E&M-EST. PATIENT-LVL V: CPT | Mod: PBBFAC,,, | Performed by: RADIOLOGY

## 2021-12-21 PROCEDURE — 99999 PR PBB SHADOW E&M-EST. PATIENT-LVL V: ICD-10-PCS | Mod: PBBFAC,,, | Performed by: RADIOLOGY

## 2021-12-21 RX ORDER — NAPROXEN SODIUM 220 MG
220 TABLET ORAL 2 TIMES DAILY WITH MEALS
COMMUNITY

## 2021-12-22 RX ORDER — NEOMYCIN SULFATE, POLYMYXIN B SULFATE, AND DEXAMETHASONE 3.5; 10000; 1 MG/G; [USP'U]/G; MG/G
OINTMENT OPHTHALMIC
Qty: 3.5 G | Refills: 2 | Status: SHIPPED | OUTPATIENT
Start: 2021-12-22

## 2021-12-25 DIAGNOSIS — C61 PROSTATE CANCER: Primary | ICD-10-CM

## 2021-12-25 RX ORDER — BICALUTAMIDE 50 MG/1
50 TABLET, FILM COATED ORAL DAILY
Qty: 90 TABLET | Refills: 1 | Status: SHIPPED | OUTPATIENT
Start: 2021-12-25 | End: 2022-07-20

## 2021-12-26 NOTE — TELEPHONE ENCOUNTER
----- Message from John L. Ochsner Jr., MD sent at 12/24/2019  8:06 AM CST -----  Yes, the MRI does not affect the total hip. The total hip is well fixed.  You will need to contact the radiologist to let him know that the patient has a total hip. The radiologist may need to do the MRI with metal subtraction.  They call this MARS, because the total hip causes scatter from the MRI images.  ----- Message -----  From: Isabella Howell LPN  Sent: 12/23/2019  12:52 PM CST  To: John L. Ochsner Jr., MD, #    Dr vargas would like dr saha to have an mri of the prostate. Dr saha stated he had a hip replacement about 10 years ago by dr ochsner . Can he have a mri?    
Lm on vm informing pt of dr ochsner note. I also spoke with mri tech and noted in the appt that pt has hip replacement  
[employer]Contact precautions

## 2021-12-29 ENCOUNTER — CLINICAL SUPPORT (OUTPATIENT)
Dept: RADIATION ONCOLOGY | Facility: CLINIC | Age: 71
End: 2021-12-29
Payer: MEDICARE

## 2021-12-29 PROCEDURE — 96402 CHEMO HORMON ANTINEOPL SQ/IM: CPT | Mod: PBBFAC

## 2021-12-29 RX ADMIN — LEUPROLIDE ACETATE 45 MG: KIT at 10:12

## 2021-12-30 ENCOUNTER — PATIENT MESSAGE (OUTPATIENT)
Dept: INTERNAL MEDICINE | Facility: CLINIC | Age: 71
End: 2021-12-30
Payer: MEDICARE

## 2021-12-30 ENCOUNTER — IMMUNIZATION (OUTPATIENT)
Dept: INTERNAL MEDICINE | Facility: CLINIC | Age: 71
End: 2021-12-30
Payer: MEDICARE

## 2021-12-30 DIAGNOSIS — Z23 NEED FOR VACCINATION: Primary | ICD-10-CM

## 2021-12-30 PROCEDURE — 0004A COVID-19, MRNA, LNP-S, PF, 30 MCG/0.3 ML DOSE VACCINE: CPT | Mod: PBBFAC,PO

## 2022-01-05 ENCOUNTER — HOSPITAL ENCOUNTER (OUTPATIENT)
Dept: RADIOLOGY | Facility: HOSPITAL | Age: 72
Discharge: HOME | End: 2022-01-05
Attending: RADIOLOGY
Payer: MEDICARE

## 2022-01-05 DIAGNOSIS — C61 PROSTATE CANCER: ICD-10-CM

## 2022-01-05 PROCEDURE — 72197 MRI PELVIS W/O & W/DYE: CPT | Mod: TC

## 2022-01-05 PROCEDURE — 72197 MRI PELVIS W/O & W/DYE: CPT | Mod: 26,,, | Performed by: INTERNAL MEDICINE

## 2022-01-05 PROCEDURE — 72197 MRI PROSTATECTOMY W W/O CONTRAST: ICD-10-PCS | Mod: 26,,, | Performed by: INTERNAL MEDICINE

## 2022-01-05 PROCEDURE — 25500020 PHARM REV CODE 255: Performed by: RADIOLOGY

## 2022-01-05 PROCEDURE — A9585 GADOBUTROL INJECTION: HCPCS | Performed by: RADIOLOGY

## 2022-01-05 RX ORDER — GADOBUTROL 604.72 MG/ML
10 INJECTION INTRAVENOUS
Status: COMPLETED | OUTPATIENT
Start: 2022-01-05 | End: 2022-01-05

## 2022-01-05 RX ADMIN — GADOBUTROL 10 ML: 604.72 INJECTION INTRAVENOUS at 02:01

## 2022-01-24 ENCOUNTER — TELEPHONE (OUTPATIENT)
Dept: RADIATION ONCOLOGY | Facility: CLINIC | Age: 72
End: 2022-01-24
Payer: MEDICARE

## 2022-01-24 DIAGNOSIS — U07.1 COVID: Primary | ICD-10-CM

## 2022-01-24 NOTE — TELEPHONE ENCOUNTER
----- Message from Lesa Crocker sent at 1/24/2022 10:22 AM CST -----  Regarding: Reschedule Sim  Re: Has Sim this week and he tested positive and would like to reschedule.  [phone]    Thanks,  Di

## 2022-01-24 NOTE — TELEPHONE ENCOUNTER
Return call to patient  He tested + for covid on 1/19/22  Simulation to prostate bed pushed 1 week

## 2022-02-02 ENCOUNTER — HOSPITAL ENCOUNTER (OUTPATIENT)
Dept: RADIATION THERAPY | Facility: HOSPITAL | Age: 72
Discharge: HOME | End: 2022-02-02
Attending: INTERNAL MEDICINE
Payer: MEDICARE

## 2022-02-02 PROCEDURE — 77263 THER RADIOLOGY TX PLNG CPLX: CPT | Mod: ,,, | Performed by: RADIOLOGY

## 2022-02-02 PROCEDURE — 77334 RADIATION TREATMENT AID(S): CPT | Mod: TC | Performed by: RADIOLOGY

## 2022-02-02 PROCEDURE — 77014 HC CT GUIDANCE RADIATION THERAPY FLDS PLACEMENT: CPT | Mod: TC | Performed by: RADIOLOGY

## 2022-02-02 PROCEDURE — 77334 PR  RADN TREATMENT AID(S) COMPLX: ICD-10-PCS | Mod: 26,,, | Performed by: RADIOLOGY

## 2022-02-02 PROCEDURE — 77014 PR  CT GUIDANCE PLACEMENT RAD THERAPY FIELDS: CPT | Mod: 26,,, | Performed by: RADIOLOGY

## 2022-02-02 PROCEDURE — 77014 PR  CT GUIDANCE PLACEMENT RAD THERAPY FIELDS: ICD-10-PCS | Mod: 26,,, | Performed by: RADIOLOGY

## 2022-02-02 PROCEDURE — 77263 PR  RADIATION THERAPY PLAN COMPLEX: ICD-10-PCS | Mod: ,,, | Performed by: RADIOLOGY

## 2022-02-02 PROCEDURE — 77334 RADIATION TREATMENT AID(S): CPT | Mod: 26,,, | Performed by: RADIOLOGY

## 2022-02-04 PROCEDURE — 77301 RADIOTHERAPY DOSE PLAN IMRT: CPT | Mod: 26,,, | Performed by: RADIOLOGY

## 2022-02-04 PROCEDURE — 77301 RADIOTHERAPY DOSE PLAN IMRT: CPT | Mod: TC | Performed by: RADIOLOGY

## 2022-02-04 PROCEDURE — 77301 PR  INTEN MOD RADIOTHER PLAN W/DOSE VOL HIST: ICD-10-PCS | Mod: 26,,, | Performed by: RADIOLOGY

## 2022-02-07 PROCEDURE — 77300 RADIATION THERAPY DOSE PLAN: CPT | Mod: 26,,, | Performed by: RADIOLOGY

## 2022-02-07 PROCEDURE — 77385 HC IMRT, SIMPLE: CPT | Performed by: RADIOLOGY

## 2022-02-07 PROCEDURE — 77014 HC CT GUIDANCE RADIATION THERAPY FLDS PLACEMENT: CPT | Mod: TC | Performed by: RADIOLOGY

## 2022-02-07 PROCEDURE — 77338 PR  MLC IMRT DESIGN & CONSTRUCTION PER IMRT PLAN: ICD-10-PCS | Mod: 26,,, | Performed by: RADIOLOGY

## 2022-02-07 PROCEDURE — 77338 DESIGN MLC DEVICE FOR IMRT: CPT | Mod: TC,59 | Performed by: RADIOLOGY

## 2022-02-07 PROCEDURE — 77300 PR RADIATION THERAPY,DOSIMETRY PLAN: ICD-10-PCS | Mod: 26,,, | Performed by: RADIOLOGY

## 2022-02-07 PROCEDURE — 77300 RADIATION THERAPY DOSE PLAN: CPT | Mod: TC | Performed by: RADIOLOGY

## 2022-02-07 PROCEDURE — 77338 DESIGN MLC DEVICE FOR IMRT: CPT | Mod: 26,,, | Performed by: RADIOLOGY

## 2022-02-08 PROCEDURE — 77014 PR  CT GUIDANCE PLACEMENT RAD THERAPY FIELDS: CPT | Mod: 26,,, | Performed by: RADIOLOGY

## 2022-02-08 PROCEDURE — 77014 PR  CT GUIDANCE PLACEMENT RAD THERAPY FIELDS: ICD-10-PCS | Mod: 26,,, | Performed by: RADIOLOGY

## 2022-02-08 PROCEDURE — 77014 HC CT GUIDANCE RADIATION THERAPY FLDS PLACEMENT: CPT | Mod: TC | Performed by: RADIOLOGY

## 2022-02-08 PROCEDURE — 77385 HC IMRT, SIMPLE: CPT | Performed by: RADIOLOGY

## 2022-02-09 ENCOUNTER — DOCUMENTATION ONLY (OUTPATIENT)
Dept: RADIATION ONCOLOGY | Facility: CLINIC | Age: 72
End: 2022-02-09
Payer: MEDICARE

## 2022-02-09 PROCEDURE — 77014 HC CT GUIDANCE RADIATION THERAPY FLDS PLACEMENT: CPT | Mod: TC | Performed by: RADIOLOGY

## 2022-02-09 PROCEDURE — 77014 PR  CT GUIDANCE PLACEMENT RAD THERAPY FIELDS: CPT | Mod: 26,,, | Performed by: RADIOLOGY

## 2022-02-09 PROCEDURE — 77014 PR  CT GUIDANCE PLACEMENT RAD THERAPY FIELDS: ICD-10-PCS | Mod: 26,,, | Performed by: RADIOLOGY

## 2022-02-09 PROCEDURE — 77385 HC IMRT, SIMPLE: CPT | Performed by: RADIOLOGY

## 2022-02-10 PROCEDURE — 77014 PR  CT GUIDANCE PLACEMENT RAD THERAPY FIELDS: ICD-10-PCS | Mod: 26,,, | Performed by: RADIOLOGY

## 2022-02-10 PROCEDURE — 77014 PR  CT GUIDANCE PLACEMENT RAD THERAPY FIELDS: CPT | Mod: 26,,, | Performed by: RADIOLOGY

## 2022-02-10 PROCEDURE — 77014 HC CT GUIDANCE RADIATION THERAPY FLDS PLACEMENT: CPT | Mod: TC | Performed by: RADIOLOGY

## 2022-02-10 PROCEDURE — 77385 HC IMRT, SIMPLE: CPT | Performed by: RADIOLOGY

## 2022-02-11 PROCEDURE — 77014 PR  CT GUIDANCE PLACEMENT RAD THERAPY FIELDS: ICD-10-PCS | Mod: 26,,, | Performed by: RADIOLOGY

## 2022-02-11 PROCEDURE — 77014 PR  CT GUIDANCE PLACEMENT RAD THERAPY FIELDS: CPT | Mod: 26,,, | Performed by: RADIOLOGY

## 2022-02-11 PROCEDURE — 77014 HC CT GUIDANCE RADIATION THERAPY FLDS PLACEMENT: CPT | Mod: TC | Performed by: RADIOLOGY

## 2022-02-11 PROCEDURE — 77385 HC IMRT, SIMPLE: CPT | Performed by: RADIOLOGY

## 2022-02-14 PROCEDURE — 77014 HC CT GUIDANCE RADIATION THERAPY FLDS PLACEMENT: CPT | Mod: TC | Performed by: RADIOLOGY

## 2022-02-14 PROCEDURE — 77014 PR  CT GUIDANCE PLACEMENT RAD THERAPY FIELDS: ICD-10-PCS | Mod: 26,,, | Performed by: RADIOLOGY

## 2022-02-14 PROCEDURE — 77014 PR  CT GUIDANCE PLACEMENT RAD THERAPY FIELDS: CPT | Mod: 26,,, | Performed by: RADIOLOGY

## 2022-02-14 PROCEDURE — 77336 RADIATION PHYSICS CONSULT: CPT | Performed by: RADIOLOGY

## 2022-02-14 PROCEDURE — 77385 HC IMRT, SIMPLE: CPT | Performed by: RADIOLOGY

## 2022-02-15 PROCEDURE — 77014 PR  CT GUIDANCE PLACEMENT RAD THERAPY FIELDS: CPT | Mod: 26,,, | Performed by: RADIOLOGY

## 2022-02-15 PROCEDURE — 77014 PR  CT GUIDANCE PLACEMENT RAD THERAPY FIELDS: ICD-10-PCS | Mod: 26,,, | Performed by: RADIOLOGY

## 2022-02-15 PROCEDURE — 77014 HC CT GUIDANCE RADIATION THERAPY FLDS PLACEMENT: CPT | Mod: TC | Performed by: RADIOLOGY

## 2022-02-15 PROCEDURE — 77385 HC IMRT, SIMPLE: CPT | Performed by: RADIOLOGY

## 2022-02-16 ENCOUNTER — DOCUMENTATION ONLY (OUTPATIENT)
Dept: RADIATION ONCOLOGY | Facility: CLINIC | Age: 72
End: 2022-02-16
Payer: MEDICARE

## 2022-02-16 PROCEDURE — 77014 PR  CT GUIDANCE PLACEMENT RAD THERAPY FIELDS: CPT | Mod: 26,,, | Performed by: RADIOLOGY

## 2022-02-16 PROCEDURE — 77385 HC IMRT, SIMPLE: CPT | Performed by: RADIOLOGY

## 2022-02-16 PROCEDURE — 77014 HC CT GUIDANCE RADIATION THERAPY FLDS PLACEMENT: CPT | Mod: TC | Performed by: RADIOLOGY

## 2022-02-16 PROCEDURE — 77014 PR  CT GUIDANCE PLACEMENT RAD THERAPY FIELDS: ICD-10-PCS | Mod: 26,,, | Performed by: RADIOLOGY

## 2022-02-17 PROCEDURE — 77014 PR  CT GUIDANCE PLACEMENT RAD THERAPY FIELDS: ICD-10-PCS | Mod: 26,,, | Performed by: RADIOLOGY

## 2022-02-17 PROCEDURE — 77385 HC IMRT, SIMPLE: CPT | Performed by: RADIOLOGY

## 2022-02-17 PROCEDURE — 77014 HC CT GUIDANCE RADIATION THERAPY FLDS PLACEMENT: CPT | Mod: TC | Performed by: RADIOLOGY

## 2022-02-17 PROCEDURE — 77014 PR  CT GUIDANCE PLACEMENT RAD THERAPY FIELDS: CPT | Mod: 26,,, | Performed by: RADIOLOGY

## 2022-02-18 PROCEDURE — 77385 HC IMRT, SIMPLE: CPT | Performed by: RADIOLOGY

## 2022-02-18 PROCEDURE — 77014 HC CT GUIDANCE RADIATION THERAPY FLDS PLACEMENT: CPT | Mod: TC | Performed by: RADIOLOGY

## 2022-02-18 PROCEDURE — 77014 PR  CT GUIDANCE PLACEMENT RAD THERAPY FIELDS: ICD-10-PCS | Mod: 26,,, | Performed by: RADIOLOGY

## 2022-02-18 PROCEDURE — 77014 PR  CT GUIDANCE PLACEMENT RAD THERAPY FIELDS: CPT | Mod: 26,,, | Performed by: RADIOLOGY

## 2022-02-20 PROCEDURE — 77385 HC IMRT, SIMPLE: CPT | Performed by: RADIOLOGY

## 2022-02-20 PROCEDURE — 77014 PR  CT GUIDANCE PLACEMENT RAD THERAPY FIELDS: ICD-10-PCS | Mod: 26,,, | Performed by: RADIOLOGY

## 2022-02-20 PROCEDURE — 77014 HC CT GUIDANCE RADIATION THERAPY FLDS PLACEMENT: CPT | Mod: TC | Performed by: RADIOLOGY

## 2022-02-20 PROCEDURE — 77014 PR  CT GUIDANCE PLACEMENT RAD THERAPY FIELDS: CPT | Mod: 26,,, | Performed by: RADIOLOGY

## 2022-02-21 PROCEDURE — 77336 RADIATION PHYSICS CONSULT: CPT | Performed by: RADIOLOGY

## 2022-02-21 PROCEDURE — 77014 PR  CT GUIDANCE PLACEMENT RAD THERAPY FIELDS: ICD-10-PCS | Mod: 26,,, | Performed by: RADIOLOGY

## 2022-02-21 PROCEDURE — 77014 PR  CT GUIDANCE PLACEMENT RAD THERAPY FIELDS: CPT | Mod: 26,,, | Performed by: RADIOLOGY

## 2022-02-21 PROCEDURE — 77014 HC CT GUIDANCE RADIATION THERAPY FLDS PLACEMENT: CPT | Mod: TC | Performed by: RADIOLOGY

## 2022-02-21 PROCEDURE — 77385 HC IMRT, SIMPLE: CPT | Performed by: RADIOLOGY

## 2022-02-22 PROCEDURE — 77014 HC CT GUIDANCE RADIATION THERAPY FLDS PLACEMENT: CPT | Mod: TC | Performed by: RADIOLOGY

## 2022-02-22 PROCEDURE — 77385 HC IMRT, SIMPLE: CPT | Performed by: RADIOLOGY

## 2022-02-22 PROCEDURE — 77014 PR  CT GUIDANCE PLACEMENT RAD THERAPY FIELDS: ICD-10-PCS | Mod: 26,,, | Performed by: RADIOLOGY

## 2022-02-22 PROCEDURE — 77014 PR  CT GUIDANCE PLACEMENT RAD THERAPY FIELDS: CPT | Mod: 26,,, | Performed by: RADIOLOGY

## 2022-02-23 ENCOUNTER — DOCUMENTATION ONLY (OUTPATIENT)
Dept: RADIATION ONCOLOGY | Facility: CLINIC | Age: 72
End: 2022-02-23
Payer: MEDICARE

## 2022-02-23 PROCEDURE — 77014 HC CT GUIDANCE RADIATION THERAPY FLDS PLACEMENT: CPT | Mod: TC | Performed by: RADIOLOGY

## 2022-02-23 PROCEDURE — 77385 HC IMRT, SIMPLE: CPT | Performed by: RADIOLOGY

## 2022-02-23 PROCEDURE — 77014 PR  CT GUIDANCE PLACEMENT RAD THERAPY FIELDS: ICD-10-PCS | Mod: 26,,, | Performed by: RADIOLOGY

## 2022-02-23 PROCEDURE — 77014 PR  CT GUIDANCE PLACEMENT RAD THERAPY FIELDS: CPT | Mod: 26,,, | Performed by: RADIOLOGY

## 2022-02-24 PROCEDURE — 77014 PR  CT GUIDANCE PLACEMENT RAD THERAPY FIELDS: CPT | Mod: 26,,, | Performed by: RADIOLOGY

## 2022-02-24 PROCEDURE — 77014 PR  CT GUIDANCE PLACEMENT RAD THERAPY FIELDS: ICD-10-PCS | Mod: 26,,, | Performed by: RADIOLOGY

## 2022-02-24 PROCEDURE — 77014 HC CT GUIDANCE RADIATION THERAPY FLDS PLACEMENT: CPT | Mod: TC | Performed by: RADIOLOGY

## 2022-02-24 PROCEDURE — 77385 HC IMRT, SIMPLE: CPT | Performed by: RADIOLOGY

## 2022-02-28 PROCEDURE — 77014 HC CT GUIDANCE RADIATION THERAPY FLDS PLACEMENT: CPT | Mod: TC | Performed by: RADIOLOGY

## 2022-02-28 PROCEDURE — 77385 HC IMRT, SIMPLE: CPT | Performed by: RADIOLOGY

## 2022-02-28 PROCEDURE — 77014 PR  CT GUIDANCE PLACEMENT RAD THERAPY FIELDS: CPT | Mod: 26,,, | Performed by: RADIOLOGY

## 2022-02-28 PROCEDURE — 77014 PR  CT GUIDANCE PLACEMENT RAD THERAPY FIELDS: ICD-10-PCS | Mod: 26,,, | Performed by: RADIOLOGY

## 2022-03-01 PROCEDURE — 77336 RADIATION PHYSICS CONSULT: CPT | Performed by: RADIOLOGY

## 2022-03-02 ENCOUNTER — DOCUMENTATION ONLY (OUTPATIENT)
Dept: RADIATION ONCOLOGY | Facility: CLINIC | Age: 72
End: 2022-03-02
Payer: MEDICARE

## 2022-03-02 ENCOUNTER — HOSPITAL ENCOUNTER (OUTPATIENT)
Dept: RADIATION THERAPY | Facility: HOSPITAL | Age: 72
Discharge: HOME | End: 2022-03-02
Attending: RADIOLOGY
Payer: MEDICARE

## 2022-03-02 PROCEDURE — 77014 PR  CT GUIDANCE PLACEMENT RAD THERAPY FIELDS: CPT | Mod: 26,,, | Performed by: RADIOLOGY

## 2022-03-02 PROCEDURE — 77014 HC CT GUIDANCE RADIATION THERAPY FLDS PLACEMENT: CPT | Mod: TC | Performed by: RADIOLOGY

## 2022-03-02 PROCEDURE — 77385 HC IMRT, SIMPLE: CPT | Performed by: RADIOLOGY

## 2022-03-02 PROCEDURE — 77014 PR  CT GUIDANCE PLACEMENT RAD THERAPY FIELDS: ICD-10-PCS | Mod: 26,,, | Performed by: RADIOLOGY

## 2022-03-03 ENCOUNTER — PATIENT MESSAGE (OUTPATIENT)
Dept: INTERNAL MEDICINE | Facility: CLINIC | Age: 72
End: 2022-03-03
Payer: MEDICARE

## 2022-03-03 DIAGNOSIS — J32.0 CHRONIC MAXILLARY SINUSITIS: ICD-10-CM

## 2022-03-03 PROCEDURE — 77014 PR  CT GUIDANCE PLACEMENT RAD THERAPY FIELDS: CPT | Mod: 26,,, | Performed by: RADIOLOGY

## 2022-03-03 PROCEDURE — 77385 HC IMRT, SIMPLE: CPT | Performed by: RADIOLOGY

## 2022-03-03 PROCEDURE — 77014 HC CT GUIDANCE RADIATION THERAPY FLDS PLACEMENT: CPT | Mod: TC | Performed by: RADIOLOGY

## 2022-03-03 PROCEDURE — 77014 PR  CT GUIDANCE PLACEMENT RAD THERAPY FIELDS: ICD-10-PCS | Mod: 26,,, | Performed by: RADIOLOGY

## 2022-03-03 RX ORDER — FLUTICASONE PROPIONATE 50 MCG
2 SPRAY, SUSPENSION (ML) NASAL NIGHTLY PRN
Qty: 48 G | Refills: 0 | Status: SHIPPED | OUTPATIENT
Start: 2022-03-03

## 2022-03-03 NOTE — TELEPHONE ENCOUNTER
No new care gaps identified.  Powered by [employer]. Reference number: [id number].   3/03/2022 9:29:06 AM CST  
(0) swallows foods and liquids w/o difficulty

## 2022-03-04 PROCEDURE — 77014 PR  CT GUIDANCE PLACEMENT RAD THERAPY FIELDS: CPT | Mod: 26,,, | Performed by: RADIOLOGY

## 2022-03-04 PROCEDURE — 77014 HC CT GUIDANCE RADIATION THERAPY FLDS PLACEMENT: CPT | Mod: TC | Performed by: RADIOLOGY

## 2022-03-04 PROCEDURE — 77385 HC IMRT, SIMPLE: CPT | Performed by: RADIOLOGY

## 2022-03-04 PROCEDURE — 77014 PR  CT GUIDANCE PLACEMENT RAD THERAPY FIELDS: ICD-10-PCS | Mod: 26,,, | Performed by: RADIOLOGY

## 2022-03-07 PROCEDURE — 77014 HC CT GUIDANCE RADIATION THERAPY FLDS PLACEMENT: CPT | Mod: TC | Performed by: RADIOLOGY

## 2022-03-07 PROCEDURE — 77385 HC IMRT, SIMPLE: CPT | Performed by: RADIOLOGY

## 2022-03-07 PROCEDURE — 77014 PR  CT GUIDANCE PLACEMENT RAD THERAPY FIELDS: CPT | Mod: 26,,, | Performed by: RADIOLOGY

## 2022-03-07 PROCEDURE — 77014 PR  CT GUIDANCE PLACEMENT RAD THERAPY FIELDS: ICD-10-PCS | Mod: 26,,, | Performed by: RADIOLOGY

## 2022-03-08 PROCEDURE — 77300 RADIATION THERAPY DOSE PLAN: CPT | Mod: TC | Performed by: RADIOLOGY

## 2022-03-08 PROCEDURE — 77014 PR  CT GUIDANCE PLACEMENT RAD THERAPY FIELDS: ICD-10-PCS | Mod: 26,,, | Performed by: RADIOLOGY

## 2022-03-08 PROCEDURE — 77300 PR RADIATION THERAPY,DOSIMETRY PLAN: ICD-10-PCS | Mod: 26,,, | Performed by: RADIOLOGY

## 2022-03-08 PROCEDURE — 77300 RADIATION THERAPY DOSE PLAN: CPT | Mod: 26,,, | Performed by: RADIOLOGY

## 2022-03-08 PROCEDURE — 77338 DESIGN MLC DEVICE FOR IMRT: CPT | Mod: TC | Performed by: RADIOLOGY

## 2022-03-08 PROCEDURE — 77338 DESIGN MLC DEVICE FOR IMRT: CPT | Mod: 26,,, | Performed by: RADIOLOGY

## 2022-03-08 PROCEDURE — 77014 PR  CT GUIDANCE PLACEMENT RAD THERAPY FIELDS: CPT | Mod: 26,,, | Performed by: RADIOLOGY

## 2022-03-08 PROCEDURE — 77338 PR  MLC IMRT DESIGN & CONSTRUCTION PER IMRT PLAN: ICD-10-PCS | Mod: 26,,, | Performed by: RADIOLOGY

## 2022-03-08 PROCEDURE — 77014 HC CT GUIDANCE RADIATION THERAPY FLDS PLACEMENT: CPT | Mod: TC | Performed by: RADIOLOGY

## 2022-03-08 PROCEDURE — 77385 HC IMRT, SIMPLE: CPT | Performed by: RADIOLOGY

## 2022-03-09 ENCOUNTER — DOCUMENTATION ONLY (OUTPATIENT)
Dept: RADIATION ONCOLOGY | Facility: CLINIC | Age: 72
End: 2022-03-09
Payer: MEDICARE

## 2022-03-09 DIAGNOSIS — C61 PROSTATE CANCER: Primary | ICD-10-CM

## 2022-03-09 PROCEDURE — 77014 HC CT GUIDANCE RADIATION THERAPY FLDS PLACEMENT: CPT | Mod: TC | Performed by: RADIOLOGY

## 2022-03-09 PROCEDURE — 77336 RADIATION PHYSICS CONSULT: CPT | Performed by: RADIOLOGY

## 2022-03-09 PROCEDURE — 77014 PR  CT GUIDANCE PLACEMENT RAD THERAPY FIELDS: CPT | Mod: 26,,, | Performed by: RADIOLOGY

## 2022-03-09 PROCEDURE — 77385 HC IMRT, SIMPLE: CPT | Performed by: RADIOLOGY

## 2022-03-09 PROCEDURE — 77014 PR  CT GUIDANCE PLACEMENT RAD THERAPY FIELDS: ICD-10-PCS | Mod: 26,,, | Performed by: RADIOLOGY

## 2022-03-10 PROCEDURE — 77014 PR  CT GUIDANCE PLACEMENT RAD THERAPY FIELDS: ICD-10-PCS | Mod: 26,,, | Performed by: RADIOLOGY

## 2022-03-10 PROCEDURE — 77014 HC CT GUIDANCE RADIATION THERAPY FLDS PLACEMENT: CPT | Mod: TC | Performed by: RADIOLOGY

## 2022-03-10 PROCEDURE — 77014 PR  CT GUIDANCE PLACEMENT RAD THERAPY FIELDS: CPT | Mod: 26,,, | Performed by: RADIOLOGY

## 2022-03-10 PROCEDURE — 77385 HC IMRT, SIMPLE: CPT | Performed by: RADIOLOGY

## 2022-03-11 PROCEDURE — 77014 PR  CT GUIDANCE PLACEMENT RAD THERAPY FIELDS: ICD-10-PCS | Mod: 26,,, | Performed by: RADIOLOGY

## 2022-03-11 PROCEDURE — 77014 PR  CT GUIDANCE PLACEMENT RAD THERAPY FIELDS: CPT | Mod: 26,,, | Performed by: RADIOLOGY

## 2022-03-11 PROCEDURE — 77385 HC IMRT, SIMPLE: CPT | Performed by: RADIOLOGY

## 2022-03-11 PROCEDURE — 77014 HC CT GUIDANCE RADIATION THERAPY FLDS PLACEMENT: CPT | Mod: TC | Performed by: RADIOLOGY

## 2022-03-14 PROCEDURE — 77014 PR  CT GUIDANCE PLACEMENT RAD THERAPY FIELDS: CPT | Mod: 26,,, | Performed by: RADIOLOGY

## 2022-03-14 PROCEDURE — 77014 PR  CT GUIDANCE PLACEMENT RAD THERAPY FIELDS: ICD-10-PCS | Mod: 26,,, | Performed by: RADIOLOGY

## 2022-03-14 PROCEDURE — 77014 HC CT GUIDANCE RADIATION THERAPY FLDS PLACEMENT: CPT | Mod: TC | Performed by: RADIOLOGY

## 2022-03-14 PROCEDURE — 77385 HC IMRT, SIMPLE: CPT | Performed by: RADIOLOGY

## 2022-03-15 PROCEDURE — 77014 HC CT GUIDANCE RADIATION THERAPY FLDS PLACEMENT: CPT | Mod: TC | Performed by: RADIOLOGY

## 2022-03-15 PROCEDURE — 77014 PR  CT GUIDANCE PLACEMENT RAD THERAPY FIELDS: CPT | Mod: 26,,, | Performed by: RADIOLOGY

## 2022-03-15 PROCEDURE — 77014 PR  CT GUIDANCE PLACEMENT RAD THERAPY FIELDS: ICD-10-PCS | Mod: 26,,, | Performed by: RADIOLOGY

## 2022-03-15 PROCEDURE — 77385 HC IMRT, SIMPLE: CPT | Performed by: RADIOLOGY

## 2022-03-16 ENCOUNTER — DOCUMENTATION ONLY (OUTPATIENT)
Dept: RADIATION ONCOLOGY | Facility: CLINIC | Age: 72
End: 2022-03-16
Payer: MEDICARE

## 2022-03-16 PROCEDURE — 77385 HC IMRT, SIMPLE: CPT | Performed by: RADIOLOGY

## 2022-03-16 PROCEDURE — 77014 PR  CT GUIDANCE PLACEMENT RAD THERAPY FIELDS: CPT | Mod: 26,,, | Performed by: RADIOLOGY

## 2022-03-16 PROCEDURE — 77336 RADIATION PHYSICS CONSULT: CPT | Performed by: RADIOLOGY

## 2022-03-16 PROCEDURE — 77014 PR  CT GUIDANCE PLACEMENT RAD THERAPY FIELDS: ICD-10-PCS | Mod: 26,,, | Performed by: RADIOLOGY

## 2022-03-16 PROCEDURE — 77014 HC CT GUIDANCE RADIATION THERAPY FLDS PLACEMENT: CPT | Mod: TC | Performed by: RADIOLOGY

## 2022-03-17 PROCEDURE — 77014 HC CT GUIDANCE RADIATION THERAPY FLDS PLACEMENT: CPT | Mod: TC | Performed by: RADIOLOGY

## 2022-03-17 PROCEDURE — 77014 PR  CT GUIDANCE PLACEMENT RAD THERAPY FIELDS: ICD-10-PCS | Mod: 26,,, | Performed by: RADIOLOGY

## 2022-03-17 PROCEDURE — 77385 HC IMRT, SIMPLE: CPT | Performed by: RADIOLOGY

## 2022-03-17 PROCEDURE — 77014 PR  CT GUIDANCE PLACEMENT RAD THERAPY FIELDS: CPT | Mod: 26,,, | Performed by: RADIOLOGY

## 2022-03-22 ENCOUNTER — LAB VISIT (OUTPATIENT)
Dept: LAB | Facility: HOSPITAL | Age: 72
End: 2022-03-22
Attending: RADIOLOGY
Payer: MEDICARE

## 2022-03-22 DIAGNOSIS — C61 PROSTATE CANCER: ICD-10-CM

## 2022-03-22 LAB
ALBUMIN SERPL BCP-MCNC: 3.8 G/DL (ref 3.5–5.2)
ALP SERPL-CCNC: 69 U/L (ref 55–135)
ALT SERPL W/O P-5'-P-CCNC: 27 U/L (ref 10–44)
AST SERPL-CCNC: 32 U/L (ref 10–40)
BILIRUB DIRECT SERPL-MCNC: 0.4 MG/DL (ref 0.1–0.3)
BILIRUB SERPL-MCNC: 0.9 MG/DL (ref 0.1–1)
PROT SERPL-MCNC: 6.6 G/DL (ref 6–8.4)

## 2022-03-22 PROCEDURE — 77014 PR  CT GUIDANCE PLACEMENT RAD THERAPY FIELDS: CPT | Mod: 26,,, | Performed by: RADIOLOGY

## 2022-03-22 PROCEDURE — 77014 HC CT GUIDANCE RADIATION THERAPY FLDS PLACEMENT: CPT | Mod: TC | Performed by: RADIOLOGY

## 2022-03-22 PROCEDURE — 77014 PR  CT GUIDANCE PLACEMENT RAD THERAPY FIELDS: ICD-10-PCS | Mod: 26,,, | Performed by: RADIOLOGY

## 2022-03-22 PROCEDURE — 77385 HC IMRT, SIMPLE: CPT | Performed by: RADIOLOGY

## 2022-03-22 PROCEDURE — 80076 HEPATIC FUNCTION PANEL: CPT | Performed by: RADIOLOGY

## 2022-03-22 PROCEDURE — 36415 COLL VENOUS BLD VENIPUNCTURE: CPT | Performed by: RADIOLOGY

## 2022-03-23 ENCOUNTER — DOCUMENTATION ONLY (OUTPATIENT)
Dept: RADIATION ONCOLOGY | Facility: CLINIC | Age: 72
End: 2022-03-23
Payer: MEDICARE

## 2022-03-23 PROCEDURE — 77014 PR  CT GUIDANCE PLACEMENT RAD THERAPY FIELDS: ICD-10-PCS | Mod: 26,,, | Performed by: RADIOLOGY

## 2022-03-23 PROCEDURE — 77014 PR  CT GUIDANCE PLACEMENT RAD THERAPY FIELDS: CPT | Mod: 26,,, | Performed by: RADIOLOGY

## 2022-03-23 PROCEDURE — 77385 HC IMRT, SIMPLE: CPT | Performed by: RADIOLOGY

## 2022-03-23 PROCEDURE — 77014 HC CT GUIDANCE RADIATION THERAPY FLDS PLACEMENT: CPT | Mod: TC | Performed by: RADIOLOGY

## 2022-03-24 PROCEDURE — 77014 PR  CT GUIDANCE PLACEMENT RAD THERAPY FIELDS: ICD-10-PCS | Mod: 26,,, | Performed by: RADIOLOGY

## 2022-03-24 PROCEDURE — 77014 PR  CT GUIDANCE PLACEMENT RAD THERAPY FIELDS: CPT | Mod: 26,,, | Performed by: RADIOLOGY

## 2022-03-24 PROCEDURE — 77385 HC IMRT, SIMPLE: CPT | Performed by: RADIOLOGY

## 2022-03-24 PROCEDURE — 77014 HC CT GUIDANCE RADIATION THERAPY FLDS PLACEMENT: CPT | Mod: TC | Performed by: RADIOLOGY

## 2022-03-25 PROCEDURE — 77014 PR  CT GUIDANCE PLACEMENT RAD THERAPY FIELDS: ICD-10-PCS | Mod: 26,,, | Performed by: RADIOLOGY

## 2022-03-25 PROCEDURE — 77014 HC CT GUIDANCE RADIATION THERAPY FLDS PLACEMENT: CPT | Mod: TC | Performed by: RADIOLOGY

## 2022-03-25 PROCEDURE — 77014 PR  CT GUIDANCE PLACEMENT RAD THERAPY FIELDS: CPT | Mod: 26,,, | Performed by: RADIOLOGY

## 2022-03-25 PROCEDURE — 77385 HC IMRT, SIMPLE: CPT | Performed by: RADIOLOGY

## 2022-03-25 PROCEDURE — 77336 RADIATION PHYSICS CONSULT: CPT | Performed by: RADIOLOGY

## 2022-03-28 PROCEDURE — 77014 PR  CT GUIDANCE PLACEMENT RAD THERAPY FIELDS: ICD-10-PCS | Mod: 26,,, | Performed by: RADIOLOGY

## 2022-03-28 PROCEDURE — 77014 PR  CT GUIDANCE PLACEMENT RAD THERAPY FIELDS: CPT | Mod: 26,,, | Performed by: RADIOLOGY

## 2022-03-28 PROCEDURE — 77385 HC IMRT, SIMPLE: CPT | Performed by: RADIOLOGY

## 2022-03-28 PROCEDURE — 77014 HC CT GUIDANCE RADIATION THERAPY FLDS PLACEMENT: CPT | Mod: TC | Performed by: RADIOLOGY

## 2022-03-29 DIAGNOSIS — Z96.649 STATUS POST HIP REPLACEMENT, UNSPECIFIED LATERALITY: Primary | ICD-10-CM

## 2022-03-29 PROCEDURE — 77385 HC IMRT, SIMPLE: CPT | Performed by: RADIOLOGY

## 2022-03-29 PROCEDURE — 77014 HC CT GUIDANCE RADIATION THERAPY FLDS PLACEMENT: CPT | Mod: TC | Performed by: RADIOLOGY

## 2022-03-29 PROCEDURE — 77014 PR  CT GUIDANCE PLACEMENT RAD THERAPY FIELDS: CPT | Mod: 26,,, | Performed by: RADIOLOGY

## 2022-03-29 PROCEDURE — 77014 PR  CT GUIDANCE PLACEMENT RAD THERAPY FIELDS: ICD-10-PCS | Mod: 26,,, | Performed by: RADIOLOGY

## 2022-03-30 ENCOUNTER — OFFICE VISIT (OUTPATIENT)
Dept: ORTHOPEDICS | Facility: CLINIC | Age: 72
End: 2022-03-30
Payer: MEDICARE

## 2022-03-30 ENCOUNTER — DOCUMENTATION ONLY (OUTPATIENT)
Dept: RADIATION ONCOLOGY | Facility: CLINIC | Age: 72
End: 2022-03-30
Payer: MEDICARE

## 2022-03-30 ENCOUNTER — HOSPITAL ENCOUNTER (OUTPATIENT)
Dept: RADIOLOGY | Facility: HOSPITAL | Age: 72
Discharge: HOME | End: 2022-03-30
Attending: ORTHOPAEDIC SURGERY
Payer: MEDICARE

## 2022-03-30 VITALS — WEIGHT: 199.94 LBS | BODY MASS INDEX: 25.66 KG/M2 | HEIGHT: 74 IN

## 2022-03-30 DIAGNOSIS — Z96.649 STATUS POST HIP REPLACEMENT, UNSPECIFIED LATERALITY: ICD-10-CM

## 2022-03-30 DIAGNOSIS — M25.552 LEFT HIP PAIN: Primary | ICD-10-CM

## 2022-03-30 DIAGNOSIS — Z96.649 HISTORY OF REVISION OF TOTAL HIP ARTHROPLASTY: ICD-10-CM

## 2022-03-30 PROCEDURE — 73502 X-RAY EXAM HIP UNI 2-3 VIEWS: CPT | Mod: 26,LT,, | Performed by: RADIOLOGY

## 2022-03-30 PROCEDURE — 99212 PR OFFICE/OUTPT VISIT, EST, LEVL II, 10-19 MIN: ICD-10-PCS | Mod: S$PBB,,, | Performed by: ORTHOPAEDIC SURGERY

## 2022-03-30 PROCEDURE — 77014 PR  CT GUIDANCE PLACEMENT RAD THERAPY FIELDS: CPT | Mod: 26,,, | Performed by: RADIOLOGY

## 2022-03-30 PROCEDURE — 99213 OFFICE O/P EST LOW 20 MIN: CPT | Mod: PBBFAC,25 | Performed by: ORTHOPAEDIC SURGERY

## 2022-03-30 PROCEDURE — 77014 HC CT GUIDANCE RADIATION THERAPY FLDS PLACEMENT: CPT | Mod: TC | Performed by: RADIOLOGY

## 2022-03-30 PROCEDURE — 99212 OFFICE O/P EST SF 10 MIN: CPT | Mod: S$PBB,,, | Performed by: ORTHOPAEDIC SURGERY

## 2022-03-30 PROCEDURE — 73502 XR HIP WITH PELVIS WHEN PERFORMED, 2 OR 3 VIEWS LEFT: ICD-10-PCS | Mod: 26,LT,, | Performed by: RADIOLOGY

## 2022-03-30 PROCEDURE — 77385 HC IMRT, SIMPLE: CPT | Performed by: RADIOLOGY

## 2022-03-30 PROCEDURE — 99999 PR PBB SHADOW E&M-EST. PATIENT-LVL III: ICD-10-PCS | Mod: PBBFAC,,, | Performed by: ORTHOPAEDIC SURGERY

## 2022-03-30 PROCEDURE — 73502 X-RAY EXAM HIP UNI 2-3 VIEWS: CPT | Mod: TC,LT

## 2022-03-30 PROCEDURE — 99999 PR PBB SHADOW E&M-EST. PATIENT-LVL III: CPT | Mod: PBBFAC,,, | Performed by: ORTHOPAEDIC SURGERY

## 2022-03-30 PROCEDURE — 77014 PR  CT GUIDANCE PLACEMENT RAD THERAPY FIELDS: ICD-10-PCS | Mod: 26,,, | Performed by: RADIOLOGY

## 2022-03-30 NOTE — PLAN OF CARE
Day 35 of outpatient radiation to the prostate. Pt reports increased urinary frequency, denies dysuria. Reports one episode of watery stools, now resolved. Nocturia x's 5.

## 2022-03-31 PROCEDURE — 77014 PR  CT GUIDANCE PLACEMENT RAD THERAPY FIELDS: CPT | Mod: 26,,, | Performed by: RADIOLOGY

## 2022-03-31 PROCEDURE — 77385 HC IMRT, SIMPLE: CPT | Performed by: RADIOLOGY

## 2022-03-31 PROCEDURE — 77014 HC CT GUIDANCE RADIATION THERAPY FLDS PLACEMENT: CPT | Mod: TC | Performed by: RADIOLOGY

## 2022-03-31 PROCEDURE — 77014 PR  CT GUIDANCE PLACEMENT RAD THERAPY FIELDS: ICD-10-PCS | Mod: 26,,, | Performed by: RADIOLOGY

## 2022-04-01 ENCOUNTER — HOSPITAL ENCOUNTER (OUTPATIENT)
Dept: RADIATION THERAPY | Facility: HOSPITAL | Age: 72
Discharge: HOME | End: 2022-04-01
Attending: RADIOLOGY
Payer: MEDICARE

## 2022-04-01 PROCEDURE — 77014 HC CT GUIDANCE RADIATION THERAPY FLDS PLACEMENT: CPT | Mod: TC | Performed by: RADIOLOGY

## 2022-04-01 PROCEDURE — 77014 PR  CT GUIDANCE PLACEMENT RAD THERAPY FIELDS: ICD-10-PCS | Mod: 26,,, | Performed by: RADIOLOGY

## 2022-04-01 PROCEDURE — 77014 PR  CT GUIDANCE PLACEMENT RAD THERAPY FIELDS: CPT | Mod: 26,,, | Performed by: RADIOLOGY

## 2022-04-01 PROCEDURE — 77385 HC IMRT, SIMPLE: CPT | Performed by: RADIOLOGY

## 2022-04-04 ENCOUNTER — PATIENT MESSAGE (OUTPATIENT)
Dept: ORTHOPEDICS | Facility: CLINIC | Age: 72
End: 2022-04-04
Payer: MEDICARE

## 2022-04-04 ENCOUNTER — PATIENT MESSAGE (OUTPATIENT)
Dept: INTERNAL MEDICINE | Facility: CLINIC | Age: 72
End: 2022-04-04
Payer: MEDICARE

## 2022-04-04 ENCOUNTER — DOCUMENTATION ONLY (OUTPATIENT)
Dept: RADIATION ONCOLOGY | Facility: CLINIC | Age: 72
End: 2022-04-04
Payer: MEDICARE

## 2022-04-04 PROCEDURE — 77014 PR  CT GUIDANCE PLACEMENT RAD THERAPY FIELDS: ICD-10-PCS | Mod: 26,,, | Performed by: RADIOLOGY

## 2022-04-04 PROCEDURE — 77336 RADIATION PHYSICS CONSULT: CPT | Performed by: RADIOLOGY

## 2022-04-04 PROCEDURE — 77014 PR  CT GUIDANCE PLACEMENT RAD THERAPY FIELDS: CPT | Mod: 26,,, | Performed by: RADIOLOGY

## 2022-04-04 PROCEDURE — 77385 HC IMRT, SIMPLE: CPT | Performed by: RADIOLOGY

## 2022-04-04 PROCEDURE — 77014 HC CT GUIDANCE RADIATION THERAPY FLDS PLACEMENT: CPT | Mod: TC | Performed by: RADIOLOGY

## 2022-04-04 RX ORDER — BUDESONIDE AND FORMOTEROL FUMARATE DIHYDRATE 160; 4.5 UG/1; UG/1
2 AEROSOL RESPIRATORY (INHALATION) EVERY 12 HOURS
Qty: 10.2 G | Refills: 3 | Status: SHIPPED | OUTPATIENT
Start: 2022-04-04 | End: 2022-07-20

## 2022-04-06 ENCOUNTER — PATIENT MESSAGE (OUTPATIENT)
Dept: ORTHOPEDICS | Facility: CLINIC | Age: 72
End: 2022-04-06
Payer: MEDICARE

## 2022-04-13 DIAGNOSIS — C61 PROSTATE CANCER: Primary | ICD-10-CM

## 2022-04-29 ENCOUNTER — PATIENT MESSAGE (OUTPATIENT)
Dept: SURGERY | Facility: CLINIC | Age: 72
End: 2022-04-29
Payer: MEDICARE

## 2022-04-29 ENCOUNTER — PATIENT MESSAGE (OUTPATIENT)
Dept: INTERNAL MEDICINE | Facility: CLINIC | Age: 72
End: 2022-04-29
Payer: MEDICARE

## 2022-04-29 RX ORDER — OMEPRAZOLE 20 MG/1
20 CAPSULE, DELAYED RELEASE ORAL DAILY
Qty: 90 CAPSULE | Refills: 3 | Status: SHIPPED | OUTPATIENT
Start: 2022-04-29 | End: 2023-01-25 | Stop reason: SDUPTHER

## 2022-06-03 ENCOUNTER — PATIENT MESSAGE (OUTPATIENT)
Dept: UROLOGY | Facility: CLINIC | Age: 72
End: 2022-06-03
Payer: MEDICARE

## 2022-06-29 ENCOUNTER — TELEPHONE (OUTPATIENT)
Dept: RADIATION ONCOLOGY | Facility: CLINIC | Age: 72
End: 2022-06-29
Payer: MEDICARE

## 2022-06-29 NOTE — TELEPHONE ENCOUNTER
Called to schedule follow-up appointments; no answer, left voicemail requesting a call back. Contact information provided.

## 2022-07-03 ENCOUNTER — PATIENT MESSAGE (OUTPATIENT)
Dept: INTERNAL MEDICINE | Facility: CLINIC | Age: 72
End: 2022-07-03
Payer: MEDICARE

## 2022-07-05 RX ORDER — ATORVASTATIN CALCIUM 20 MG/1
20 TABLET, FILM COATED ORAL NIGHTLY
Qty: 90 TABLET | Refills: 3 | Status: SHIPPED | OUTPATIENT
Start: 2022-07-05

## 2022-07-05 NOTE — TELEPHONE ENCOUNTER
No new care gaps identified.  [facility] Embedded Care Gaps. Reference number: [id number]. 7/05/2022   9:29:54 AM [place]

## 2022-07-14 ENCOUNTER — TELEPHONE (OUTPATIENT)
Dept: ORTHOPEDICS | Facility: CLINIC | Age: 72
End: 2022-07-14
Payer: MEDICARE

## 2022-07-14 ENCOUNTER — LAB VISIT (OUTPATIENT)
Dept: LAB | Facility: HOSPITAL | Age: 72
End: 2022-07-14
Attending: RADIOLOGY
Payer: MEDICARE

## 2022-07-14 DIAGNOSIS — C61 PROSTATE CANCER: ICD-10-CM

## 2022-07-14 LAB — COMPLEXED PSA SERPL-MCNC: <0.01 NG/ML (ref 0–4)

## 2022-07-14 PROCEDURE — 84153 ASSAY OF PSA TOTAL: CPT | Performed by: RADIOLOGY

## 2022-07-14 PROCEDURE — 36415 COLL VENOUS BLD VENIPUNCTURE: CPT | Mod: PO | Performed by: RADIOLOGY

## 2022-07-14 NOTE — TELEPHONE ENCOUNTER
----- Message from Lesa Huerta sent at 7/14/2022 10:40 AM CDT -----  Please call pt @ [phone] regarding letter received say it time to schedule appt at the [facility], pt never seen one at the [facility], please advise

## 2022-07-20 ENCOUNTER — OFFICE VISIT (OUTPATIENT)
Dept: RADIATION ONCOLOGY | Facility: CLINIC | Age: 72
End: 2022-07-20
Payer: MEDICARE

## 2022-07-20 VITALS
WEIGHT: 193.69 LBS | HEART RATE: 74 BPM | RESPIRATION RATE: 18 BRPM | HEIGHT: 74 IN | DIASTOLIC BLOOD PRESSURE: 78 MMHG | TEMPERATURE: 98 F | BODY MASS INDEX: 24.86 KG/M2 | SYSTOLIC BLOOD PRESSURE: 170 MMHG | OXYGEN SATURATION: 97 %

## 2022-07-20 DIAGNOSIS — C61 PROSTATE CANCER: Primary | ICD-10-CM

## 2022-07-20 PROCEDURE — 99499 NO LOS: ICD-10-PCS | Mod: S$PBB,,, | Performed by: RADIOLOGY

## 2022-07-20 PROCEDURE — 99214 OFFICE O/P EST MOD 30 MIN: CPT | Mod: PBBFAC | Performed by: RADIOLOGY

## 2022-07-20 PROCEDURE — 99499 UNLISTED E&M SERVICE: CPT | Mod: S$PBB,,, | Performed by: RADIOLOGY

## 2022-07-20 PROCEDURE — 99999 PR PBB SHADOW E&M-EST. PATIENT-LVL IV: ICD-10-PCS | Mod: PBBFAC,,, | Performed by: RADIOLOGY

## 2022-07-20 PROCEDURE — 99999 PR PBB SHADOW E&M-EST. PATIENT-LVL IV: CPT | Mod: PBBFAC,,, | Performed by: RADIOLOGY

## 2022-07-20 NOTE — Clinical Note
He is planning to relocate to [place], [place].  Recommend repeat PSA and testosterone in 6 months .

## 2022-08-04 ENCOUNTER — PES CALL (OUTPATIENT)
Dept: ADMINISTRATIVE | Facility: OTHER | Age: 72
End: 2022-08-04
Payer: MEDICARE

## 2022-09-22 ENCOUNTER — PATIENT MESSAGE (OUTPATIENT)
Dept: ADMINISTRATIVE | Facility: OTHER | Age: 72
End: 2022-09-22
Payer: MEDICARE

## 2022-11-09 ENCOUNTER — PATIENT MESSAGE (OUTPATIENT)
Dept: INTERNAL MEDICINE | Facility: CLINIC | Age: 72
End: 2022-11-09
Payer: MEDICARE

## 2022-11-09 RX ORDER — LISINOPRIL 10 MG/1
20 TABLET ORAL DAILY
Qty: 180 TABLET | Refills: 3 | Status: SHIPPED | OUTPATIENT
Start: 2022-11-09

## 2022-11-09 NOTE — TELEPHONE ENCOUNTER
Care Due:                  Date            Visit Type   Department     Provider  --------------------------------------------------------------------------------    Last Visit: None Found      None         None Found  Next Visit: None Scheduled  None         None Found                                                            Last  Test          Frequency    Reason                     Performed    Due Date  --------------------------------------------------------------------------------    Office Visit  12 months..  atorvastatin.............  Not Found    Overdue    Lipid Panel.  12 months..  atorvastatin.............  12- 12-    [facility] Embedded Care Gaps. Reference number: [id number]. 11/09/2022   10:12:38 AM CST

## 2023-01-25 ENCOUNTER — PATIENT MESSAGE (OUTPATIENT)
Dept: INTERNAL MEDICINE | Facility: CLINIC | Age: 73
End: 2023-01-25
Payer: MEDICARE

## 2023-01-25 RX ORDER — OMEPRAZOLE 20 MG/1
20 CAPSULE, DELAYED RELEASE ORAL DAILY
Qty: 90 CAPSULE | Refills: 3 | Status: SHIPPED | OUTPATIENT
Start: 2023-01-25

## 2023-01-25 NOTE — TELEPHONE ENCOUNTER
Care Due:                  Date            Visit Type   Department     Provider  --------------------------------------------------------------------------------    Last Visit: None Found      None         None Found  Next Visit: None Scheduled  None         None Found                                                            Last  Test          Frequency    Reason                     Performed    Due Date  --------------------------------------------------------------------------------    Office Visit  12 months..  atorvastatin, lisinopriL.  Not Found    Overdue    CMP.........  12 months..  atorvastatin, lisinopriL.  05- 12-    Lipid Panel.  12 months..  atorvastatin.............  12- 12-    [employer] Embedded Care Gaps. Reference number: [id number]. 1/25/2023   11:24:34 AM CST

## 2023-05-18 LAB
FINAL PATHOLOGIC DIAGNOSIS: NORMAL
GROSS: NORMAL
MICROSCOPIC EXAM: NORMAL
SUPPLEMENTAL DIAGNOSIS: NORMAL

## 2023-08-09 ENCOUNTER — PATIENT MESSAGE (OUTPATIENT)
Dept: ADMINISTRATIVE | Facility: HOSPITAL | Age: 73
End: 2023-08-09
Payer: MEDICARE

## 2023-08-09 ENCOUNTER — PATIENT OUTREACH (OUTPATIENT)
Dept: ADMINISTRATIVE | Facility: HOSPITAL | Age: 73
End: 2023-08-09
Payer: MEDICARE

## 2023-08-09 NOTE — PROGRESS NOTES
Health Maintenance Due   Topic Date Due    Shingles Vaccine (1 of 2) Never done    COVID-19 Vaccine (4 - Pfizer series) 02/24/2022    Lipid Panel  12/09/2022

## 2023-09-22 ENCOUNTER — PATIENT MESSAGE (OUTPATIENT)
Dept: ADMINISTRATIVE | Facility: OTHER | Age: 73
End: 2023-09-22
Payer: MEDICARE

## 2025-03-06 NOTE — PROGRESS NOTES
Subjective:       Patient ID: Ha Madrid is a 72 y.o. male.    Chief Complaint: Prostate Cancer (Follow up )    This patient presents for his initial follow up visit.     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  He presented in December of 2019 when his PSA increased to 10.3 ng/ml.  Biopsies in February of 2020 revealed [place] 7 (3+4) adenocarcinoma involving 7 of 7 cores from the Lt.  apex, Lt. mid gland and Lt. base and 1 of 2 cores from the Rt. base.  He subsequently underwent RALP with bilateral node dissection on 4/29/20.  Pathology revealed a large volume of [place] 6 (3+3) adenocarcinoma.  There was no extraprostatic or seminal vesicle invasion.  There was no bladder neck invasion and the margin of resection were negative. The patient recovered well from surgery.  Postoperative PSA returned at 0.02 ng/ml.  This increased to 0.04 ng/ml in December of 2020.   Repeat PSA on 12/6/21 returned at 0.13 ng/ml.  He was referred for salvage radiotherapy and 6 months of hormonal therapy.  A 6 month Lupron injection was given on 12/29/21.  He completed radiotherapy to the prostate bed and pelvic nodes on 4/4/22.   Today the patient states he feels well.        Review of Systems   Constitutional: Negative for activity change, chills, fatigue and fever.   Respiratory: Negative for cough and shortness of breath.    Gastrointestinal: Negative for abdominal pain, change in bowel habit, constipation, diarrhea and change in bowel habit.   Genitourinary: Positive for bladder incontinence (occassional mild stress incontinence). Negative for difficulty urinating, dysuria, frequency and hematuria.         Objective:      Physical Exam  Constitutional:       General: He is not in acute distress.     Appearance: Normal appearance.   Abdominal:      General: Abdomen is flat. There is no distension.   Neurological:      Mental Status: He is alert.   Psychiatric:          Mood and Affect: Mood normal.         Judgment: Judgment normal.       PSA - < 0.01 ng/ml  Assessment:       Problem List Items Addressed This Visit     Prostate cancer - Primary          Plan:       Doing well recovered from the acute effects of radiotherapy.   He is planning to relocate to [place], [place].  Recommend repeat PSA and testosterone in 6 months .           done

## (undated) DEVICE — ELECTRODE REM PLYHSV RETURN 9

## (undated) DEVICE — SYR BULB 60CC IRRIGATION

## (undated) DEVICE — BAG TISS RETRV MONARCH 10MM

## (undated) DEVICE — TRAY FOLEY 16FR INFECTION CONT

## (undated) DEVICE — SUT VICRYL 3-0 27 SH

## (undated) DEVICE — SUT MONOCRYL 3-0 RB1

## (undated) DEVICE — DRAPE SCOPE PILLOW WARMER

## (undated) DEVICE — SUT ABS CLIP LAPRA-TY CTD

## (undated) DEVICE — TROCAR ENDOPATH XCEL 5MM 7.5CM

## (undated) DEVICE — SUT VICRYL PLUS 0 CT1 18IN

## (undated) DEVICE — SUT VICRYL PLUS 2-0 CT1 18

## (undated) DEVICE — ADHESIVE DERMABOND ADVANCED

## (undated) DEVICE — ADHESIVE MASTISOL VIAL 48/BX

## (undated) DEVICE — SYR 50ML CATH TIP

## (undated) DEVICE — TAPE SURG DURAPORE 2 X10YD

## (undated) DEVICE — SUT MONOCRYL 3-0 UNDYED RB1

## (undated) DEVICE — CONTAINER SPECIMEN STRL 4OZ

## (undated) DEVICE — Device

## (undated) DEVICE — DRESSING AQUACEL AG ADV 3.5X12

## (undated) DEVICE — DRAIN PENROSE XRAY 12 X 1/4 ST

## (undated) DEVICE — SEE L#156916

## (undated) DEVICE — SCISSOR 5MMX35CM DIRECT DRIVE

## (undated) DEVICE — PORT AIRSEAL 12/120MM LPI

## (undated) DEVICE — DRESSING ABSRBNT ISLAND 3.6X8

## (undated) DEVICE — DRESSING TRANS 4X4 TEGADERM

## (undated) DEVICE — SUT VICRYL+ 1 CT1 18IN

## (undated) DEVICE — SEE MEDLINE ITEM 157117

## (undated) DEVICE — SEE MEDLINE ITEM 157131

## (undated) DEVICE — KIT IRR SUCTION HND PIECE

## (undated) DEVICE — SUT 2-0 VICRYL / SH (J417)

## (undated) DEVICE — SUT 2/0 36IN COATED VICRYL

## (undated) DEVICE — SOL NS 1000CC

## (undated) DEVICE — TRAY MINOR GEN SURG

## (undated) DEVICE — SUT SILK 3-0 SH 18IN BLACK

## (undated) DEVICE — COVER TIP CURVED SCISSORS XI

## (undated) DEVICE — NDL INSUF ULTRA VERESS 120MM

## (undated) DEVICE — DRAIN CHANNEL ROUND 10FR

## (undated) DEVICE — CLIP HEMO-LOK MLX LARGE LF

## (undated) DEVICE — EVACUATOR WOUND BULB 100CC

## (undated) DEVICE — NDL HYPO REG 25G X 1 1/2

## (undated) DEVICE — GOWN SURGICAL X-LARGE

## (undated) DEVICE — DRAPE COLUMN DAVINCI XI

## (undated) DEVICE — SUT VICRYL PLUS 4-0 P3 18IN

## (undated) DEVICE — BIT DRILL FLEX 30MM.

## (undated) DEVICE — SEE MEDLINE ITEM 146292

## (undated) DEVICE — SUT VICRYL CTD 2-0 GI 27 SH

## (undated) DEVICE — SUT MCRYL PLUS 4-0 PS2 27IN

## (undated) DEVICE — COVER LIGHT HANDLE

## (undated) DEVICE — DRESSING TELFA STRL 4X3 LF

## (undated) DEVICE — SOL ELECTROLUBE ANTI-STIC

## (undated) DEVICE — HOOD T-5 TEAR AWAY STERILE

## (undated) DEVICE — SOL WATER STRL IRR 1000ML

## (undated) DEVICE — IMMOB KNEE UNIV TRI PANEL 19IN

## (undated) DEVICE — LUBRICANT SURGILUBE 2 OZ

## (undated) DEVICE — IRRIGATOR ENDOSCOPY DISP.

## (undated) DEVICE — SEE MEDLINE ITEM 157148

## (undated) DEVICE — DRAPE ABDOMINAL TIBURON 14X11

## (undated) DEVICE — SET TRI-LUMEN FILTERED TUBE

## (undated) DEVICE — LEGGINGS 48X31 INCH

## (undated) DEVICE — DRESSING COVER AQUACEL AG SURG

## (undated) DEVICE — SOL IRR NACL .9% 3000ML

## (undated) DEVICE — SUT PROLENE 0 MO6 30IN BLUE

## (undated) DEVICE — DRAPE ARM DAVINCI XI

## (undated) DEVICE — SEAL UNIVERSAL 5MM-8MM XI

## (undated) DEVICE — SUT PDS II 0 CT-1 VIL MONO